# Patient Record
Sex: MALE | Race: WHITE | NOT HISPANIC OR LATINO | Employment: FULL TIME | URBAN - METROPOLITAN AREA
[De-identification: names, ages, dates, MRNs, and addresses within clinical notes are randomized per-mention and may not be internally consistent; named-entity substitution may affect disease eponyms.]

---

## 2017-09-20 DIAGNOSIS — J45.40 MODERATE PERSISTENT ASTHMA WITHOUT COMPLICATION: ICD-10-CM

## 2017-09-20 NOTE — TELEPHONE ENCOUNTER
Refill sent for Advair. Patient needs provider appointment for asthma. Please send letter..  .Shu RUBALCAVAN, RN, CPN

## 2017-09-20 NOTE — LETTER
September 20, 2017    Theo Pozo  [address] [facility] [address]    Dear Theo,       We recently received a refill request for fluticasone-salmeterol (ADVAIR DISKUS) 500-50 MCG/DOSE diskus inhaler.  We have refilled this for one time only because you are due for a:    Asthma office visit      Please call at your earliest convenience so that there will not be a delay with your future refills.          Thank you,   Your [facility] Team/  [phone]

## 2017-10-23 ENCOUNTER — TELEPHONE (OUTPATIENT)
Dept: FAMILY MEDICINE | Facility: CLINIC | Age: 37
End: 2017-10-23

## 2017-10-23 DIAGNOSIS — J45.40 MODERATE PERSISTENT ASTHMA WITHOUT COMPLICATION: ICD-10-CM

## 2017-10-23 NOTE — TELEPHONE ENCOUNTER
To alternate provider to deny refill please.  advair inhaler refill  Last OV: 10/4/16  Was given refill x1 in Sept;  Note states needs to be seen    Health Maintenance Due   Topic Date Due     ASTHMA CONTROL TEST Q6 MOS  04/04/2017     ASTHMA ACTION PLAN Q1 YR  08/05/2017     INFLUENZA VACCINE (SYSTEM ASSIGNED)  09/01/2017

## 2017-10-24 NOTE — TELEPHONE ENCOUNTER
-please try to schedule follow up appointment with patient.     RN- this encounter is for Jeri.     Staci Madrigal RN

## 2017-10-27 NOTE — TELEPHONE ENCOUNTER
LM for patient letting him know rxs were denied, due to be seen. Left direct call back number.    Marielena Ybarra,

## 2017-10-27 NOTE — TELEPHONE ENCOUNTER
Patient scheduled at [facility] on Tue 10/31/17 (patient moved and will be establishing at [place])    Please give short refill on Advair to the [facility] in [place].    Marielena Ybarra,

## 2017-10-27 NOTE — PROGRESS NOTES
SUBJECTIVE:                                                    Theo Pozo is a 37 year old male who presents to clinic today for the following health issues:  ASTHMA ACTION PLAN     HPI    Asthma Follow-Up    Was ACT completed today?    Yes    ACT Total Scores 10/31/2017   ACT TOTAL SCORE -   ASTHMA ER VISITS -   ASTHMA HOSPITALIZATIONS -   ACT TOTAL SCORE (Goal Greater than or Equal to 20) 23   In the past 12 months, how many times did you visit the emergency room for your asthma without being admitted to the hospital? 0   In the past 12 months, how many times were you hospitalized overnight because of your asthma? 0       Recent asthma triggers that patient is dealing with: upper respiratory infections and humidity              Problem list and histories reviewed & adjusted, as indicated.  Additional history:     Patient Active Problem List   Diagnosis     CARDIOVASCULAR SCREENING; LDL GOAL LESS THAN 160     Epidermal inclusion cyst     Moderate persistent asthma without complication     Tobacco abuse disorder     Past Surgical History:   Procedure Laterality Date     ARTHROSCOPY KNEE RT/LT      Right knee for meniscal tear     ENDOSCOPY  2005    negatve findings     SURGICAL HISTORY OF -       nasal surgery for epistaxis and obstruction       Social History   Substance Use Topics     Smoking status: Current Every Day Smoker     Packs/day: 1.00     Years: 14.00     Types: Cigarettes     Smokeless tobacco: Former User      Comment: Advised to quit      Alcohol use 0.0 oz/week      Comment: social infrequently     Family History   Problem Relation Age of Onset     GASTROINTESTINAL DISEASE Mother      Crohn's disease         Current Outpatient Prescriptions   Medication Sig Dispense Refill     fluticasone-salmeterol (ADVAIR DISKUS) 500-50 MCG/DOSE diskus inhaler Inhale 1 puff into the lungs 2 times daily Patient needs provider appointment for more refills 1 Inhaler 0     albuterol (PROAIR HFA) 108 (90 BASE)  "MCG/ACT Inhaler Inhale 2 puffs into the lungs every 4 hours as needed 1 Inhaler 1     [DISCONTINUED] fluticasone-salmeterol (ADVAIR DISKUS) 500-50 MCG/DOSE diskus inhaler Inhale 1 puff into the lungs 2 times daily Patient needs provider appointment for more refills 1 Inhaler 0     [DISCONTINUED] albuterol (ALBUTEROL) 108 (90 BASE) MCG/ACT inhaler Inhale 2 puffs into the lungs every 4 hours as needed 1 Inhaler 1     Allergies   Allergen Reactions     Penicillins      Sulfa Drugs      BP Readings from Last 3 Encounters:   10/31/17 120/66   10/04/16 113/76   08/05/16 110/72    Wt Readings from Last 3 Encounters:   10/31/17 199 lb (90.3 kg)   10/04/16 209 lb (94.8 kg)   08/05/16 204 lb (92.5 kg)                        ROS:  Constitutional, HEENT, cardiovascular, pulmonary, gi and gu systems are negative, except as otherwise noted.      OBJECTIVE:   /66 (Cuff Size: Adult Regular)  Pulse 84  Temp 98.5  F (36.9  C) (Temporal)  Resp 16  Ht 5' 7.75\" (1.721 m)  Wt 199 lb (90.3 kg)  BMI 30.48 kg/m2  Body mass index is 30.48 kg/(m^2).  GENERAL: healthy, alert and no distress  NECK: no adenopathy, no asymmetry, masses, or scars and trachea midline and normal to palpation  RESP: lungs clear to auscultation - no rales, rhonchi or wheezes  CV: regular rate and rhythm, normal S1 S2, no S3 or S4, no murmur, click or rub, no peripheral edema and peripheral pulses strong  MS: no gross musculoskeletal defects noted, no edema    Diagnostic Test Results:  none     ASSESSMENT/PLAN:     1. Moderate persistent asthma without complication  Doing well! Refilled as requested - rov 6 MONTHS.  - fluticasone-salmeterol (ADVAIR DISKUS) 500-50 MCG/DOSE diskus inhaler; Inhale 1 puff into the lungs 2 times daily Patient needs provider appointment for more refills  Dispense: 3 Inhaler; Refill:1  - albuterol (PROAIR HFA) 108 (90 BASE) MCG/ACT Inhaler; Inhale 2 puffs into the lungs every 4 hours as needed  Dispense: 1 Inhaler; Refill: 1    2. " Tobacco abuse disorder  ADVISED CESSATION, HE DECLINES.    Jay Bernstein PA-C  [facility]

## 2017-10-31 ENCOUNTER — OFFICE VISIT (OUTPATIENT)
Dept: FAMILY MEDICINE | Facility: OTHER | Age: 37
End: 2017-10-31
Payer: MEDICAID

## 2017-10-31 VITALS
DIASTOLIC BLOOD PRESSURE: 66 MMHG | RESPIRATION RATE: 16 BRPM | SYSTOLIC BLOOD PRESSURE: 120 MMHG | TEMPERATURE: 98.5 F | HEIGHT: 68 IN | HEART RATE: 84 BPM | WEIGHT: 199 LBS | BODY MASS INDEX: 30.16 KG/M2

## 2017-10-31 DIAGNOSIS — J45.40 MODERATE PERSISTENT ASTHMA WITHOUT COMPLICATION: ICD-10-CM

## 2017-10-31 DIAGNOSIS — Z72.0 TOBACCO ABUSE DISORDER: Primary | ICD-10-CM

## 2017-10-31 PROCEDURE — 99213 OFFICE O/P EST LOW 20 MIN: CPT | Performed by: PHYSICIAN ASSISTANT

## 2017-10-31 RX ORDER — ALBUTEROL SULFATE 90 UG/1
2 AEROSOL, METERED RESPIRATORY (INHALATION) EVERY 4 HOURS PRN
Qty: 1 INHALER | Refills: 1 | Status: SHIPPED | OUTPATIENT
Start: 2017-10-31 | End: 2018-09-19

## 2017-10-31 ASSESSMENT — PAIN SCALES - GENERAL: PAINLEVEL: NO PAIN (0)

## 2017-10-31 NOTE — NURSING NOTE
"Chief Complaint   Patient presents with     Asthma     Panel Management     ACT, AAP, Flu       Initial /66 (Cuff Size: Adult Regular)  Pulse 84  Temp 98.5  F (36.9  C) (Temporal)  Resp 16  Ht 5' 7.75\" (1.721 m)  Wt 199 lb (90.3 kg)  BMI 30.48 kg/m2 Estimated body mass index is 30.48 kg/(m^2) as calculated from the following:    Height as of this encounter: 5' 7.75\" (1.721 m).    Weight as of this encounter: 199 lb (90.3 kg).  Medication Reconciliation: complete   Aby Dillon CMA (AAMA)    "

## 2017-10-31 NOTE — LETTER
My Asthma Action Plan  Name: Theo Pozo   YOB: 1980  Date: 11/9/2017   My doctor: Jay Bernstein PA-C   My clinic: [facility]        My Control Medicine: Fluticasone + salmeterol (Advair) -  Diskus 250/50 mcg ,  My Rescue Medicine: Albuterol (Proair/Ventolin/Proventil) inhaler as directed   My Asthma Severity: moderate persistent  Avoid your asthma triggers: Patient is unaware of triggers  upper respiratory infections  humidity            GREEN ZONE   Good Control    I feel good    No cough or wheeze    Can work, sleep and play without asthma symptoms       Take your asthma control medicine every day.     1. If exercise triggers your asthma, take your rescue medication    15 minutes before exercise or sports, and    During exercise if you have asthma symptoms  2. Spacer to use with inhaler: If you have a spacer, make sure to use it with your inhaler             YELLOW ZONE Getting Worse  I have ANY of these:    I do not feel good    Cough or wheeze    Chest feels tight    Wake up at night   1. Keep taking your Green Zone medications  2. Start taking your rescue medicine:    every 20 minutes for up to 1 hour. Then every 4 hours for 24-48 hours.  3. If you stay in the Yellow Zone for more than 12-24 hours, contact your doctor.  4. If you do not return to the Green Zone in 12-24 hours or you get worse, start taking your oral steroid medicine if prescribed by your provider.           RED ZONE Medical Alert - Get Help  I have ANY of these:    I feel awful    Medicine is not helping    Breathing getting harder    Trouble walking or talking    Nose opens wide to breathe       1. Take your rescue medicine NOW  2. If your provider has prescribed an oral steroid medicine, start taking it NOW  3. Call your doctor NOW  4. If you are still in the Red Zone after 20 minutes and you have not reached your doctor:    Take your rescue medicine again and    Call 911 or go to the emergency room right  away    See your regular doctor within 2 weeks of an Emergency Room or Urgent Care visit for follow-up treatment.        Electronically signed by: Jay Bernstein, November 9, 2017    Annual Reminders:  Meet with Asthma Educator,  Flu Shot in the Fall, consider Pneumonia Vaccination for patients with asthma (aged 19 and older).    Pharmacy:    [id number], [place] - [address] AT [facility] & [facility]  [id number] - [place], [place] - [address] AT [facility] [address]  [facility]  [place], [place] - [address] AT [facility] OF  [address]                    Asthma Triggers  How To Control Things That Make Your Asthma Worse    Triggers are things that make your asthma worse.  Look at the list below to help you find your triggers and what you can do about them.  You can help prevent asthma flare-ups by staying away from your triggers.      Trigger                                                          What you can do   Cigarette Smoke  Tobacco smoke can make asthma worse. Do not allow smoking in your home, car or around you.  Be sure no one smokes at a child s day care or school.  If you smoke, ask your health care provider for ways to help you quit.  Ask family members to quit too.  Ask your health care provider for a referral to Quit Plan to help you quit smoking, or call 9-732-664-PLAN.     Colds, Flu, Bronchitis  These are common triggers of asthma. Wash your hands often.  Don t touch your eyes, nose or mouth.  Get a flu shot every year.     Dust Mites  These are tiny bugs that live in cloth or carpet. They are too small to see. Wash sheets and blankets in hot water every week.   Encase pillows and mattress in dust mite proof covers.  Avoid having carpet if you can. If you have carpet, vacuum weekly.   Use a dust mask and HEPA vacuum.   Pollen and Outdoor Mold  Some people are allergic to trees, grass, or weed pollen, or molds.  Try to keep your windows closed.  Limit time out doors when pollen count is high.   Ask you health care provider about taking medicine during allergy season.     Animal Dander  Some people are allergic to skin flakes, urine or saliva from pets with fur or feathers. Keep pets with fur or feathers out of your home.    If you can t keep the pet outdoors, then keep the pet out of your bedroom.  Keep the bedroom door closed.  Keep pets off cloth furniture and away from stuffed toys.     Mice, Rats, and Cockroaches  Some people are allergic to the waste from these pests.   Cover food and garbage.  Clean up spills and food crumbs.  Store grease in the refrigerator.   Keep food out of the bedroom.   Indoor Mold  This can be a trigger if your home has high moisture. Fix leaking faucets, pipes, or other sources of water.   Clean moldy surfaces.  Dehumidify basement if it is damp and smelly.   Smoke, Strong Odors, and Sprays  These can reduce air quality. Stay away from strong odors and sprays, such as perfume, powder, hair spray, paints, smoke incense, paint, cleaning products, candles and new carpet.   Exercise or Sports  Some people with asthma have this trigger. Be active!  Ask your doctor about taking medicine before sports or exercise to prevent symptoms.    Warm up for 5-10 minutes before and after sports or exercise.     Other Triggers of Asthma  Cold air:  Cover your nose and mouth with a scarf.  Sometimes laughing or crying can be a trigger.  Some medicines and food can trigger asthma.

## 2017-10-31 NOTE — MR AVS SNAPSHOT
"              After Visit Summary   10/31/2017    Theo Pozo    MRN: [id number]           Patient Information     Date Of Birth          1980        Visit Information        Provider Department      10/31/2017 1:00 PM Jay Goodwin PA-C [facility]        Today's Diagnoses     Tobacco abuse disorder    -  1    Moderate persistent asthma without complication           Follow-ups after your visit        Who to contact     If you have questions or need follow up information about today's clinic visit or your schedule please contact [facility] directly at [phone].  Normal or non-critical lab and imaging results will be communicated to you by Mylahart, letter or phone within 4 business days after the clinic has received the results. If you do not hear from us within 7 days, please contact the clinic through Reach Prost or phone. If you have a critical or abnormal lab result, we will notify you by phone as soon as possible.  Submit refill requests through Wizdee or call your pharmacy and they will forward the refill request to us. Please allow 3 business days for your refill to be completed.          Additional Information About Your Visit        MyChart Information     Wizdee gives you secure access to your electronic health record. If you see a primary care provider, you can also send messages to your care team and make appointments. If you have questions, please call your primary care clinic.  If you do not have a primary care provider, please call [phone] and they will assist you.        Care EveryWhere ID     This is your Care EveryWhere ID. This could be used by other organizations to access your [facility] medical records  [id number]        Your Vitals Were     Pulse Temperature Respirations Height BMI (Body Mass Index)       84 98.5  F (36.9  C) (Temporal) 16 5' 7.75\" (1.721 m) 30.48 kg/m2        Blood Pressure from Last 3 Encounters:   10/31/17 120/66   10/04/16 " 113/76   08/05/16 110/72    Weight from Last 3 Encounters:   10/31/17 199 lb (90.3 kg)   10/04/16 209 lb (94.8 kg)   08/05/16 204 lb (92.5 kg)              Today, you had the following     No orders found for display         Where to get your medicines      These medications were sent to [id number] - [facility] [address] AT [facility] [address]  [address], [facility] [address]     Phone:  [phone]     albuterol 108 (90 BASE) MCG/ACT Inhaler    fluticasone-salmeterol 500-50 MCG/DOSE diskus inhaler          Primary Care Provider Office Phone # Fax #    Miguel Zavala [phone] [address]  [facility] [address]        Equal Access to Services     [facility] : Hadii karthikeyan ku hadasho Soomaali, waaxda luqadaha, qaybta kaalmada adeegyada, waxay moraimain haypaulino easton . So [facility] [phone].    ATENCIÓN: Si habla español, tiene a choudhary disposición servicios gratuitos de asistencia lingüística. [facility] [phone].    We comply with applicable federal civil rights laws and [place] laws. We do not discriminate on the basis of race, color, national origin, age, disability, sex, sexual orientation, or gender identity.            Thank you!     Thank you for choosing [facility]  for your care. Our goal is always to provide you with excellent care. Hearing back from our patients is one way we can continue to improve our services. Please take a few minutes to complete the written survey that you may receive in the mail after your visit with us. Thank you!             Your Updated Medication List - Protect others around you: Learn how to safely use, store and throw away your medicines at www.disposemymeds.org.          This list is accurate as of: 10/31/17  1:12 PM.  Always use your most recent med list.                   Brand Name Dispense Instructions for use Diagnosis    albuterol 108 (90 BASE) MCG/ACT Inhaler    PROAIR HFA     1 Inhaler    Inhale 2 puffs into the lungs every 4 hours as needed    Moderate persistent asthma without complication       fluticasone-salmeterol 500-50 MCG/DOSE diskus inhaler    ADVAIR DISKUS    1 Inhaler    Inhale 1 puff into the lungs 2 times daily Patient needs provider appointment for more refills    Moderate persistent asthma without complication

## 2017-11-01 ASSESSMENT — ASTHMA QUESTIONNAIRES: ACT_TOTALSCORE: 23

## 2017-11-10 ENCOUNTER — TELEPHONE (OUTPATIENT)
Dept: FAMILY MEDICINE | Facility: CLINIC | Age: 37
End: 2017-11-10

## 2017-11-21 DIAGNOSIS — J45.40 MODERATE PERSISTENT ASTHMA WITHOUT COMPLICATION: ICD-10-CM

## 2017-11-22 ENCOUNTER — TELEPHONE (OUTPATIENT)
Dept: FAMILY MEDICINE | Facility: OTHER | Age: 37
End: 2017-11-22

## 2017-11-22 DIAGNOSIS — J45.40 MODERATE PERSISTENT ASTHMA WITHOUT COMPLICATION: ICD-10-CM

## 2017-11-22 NOTE — TELEPHONE ENCOUNTER
Per [facility] pharmacy a Prior Authorization is needed for Albuterol inhaler, [facility] pharmacy states that patient has changed insurance recently. They are sending PA info to us  Elisabeth Hampton RT (R)

## 2017-11-22 NOTE — TELEPHONE ENCOUNTER
[facility] phone call message- patient requests medication or medication refill:    If this is a refill request, has the caller requested the refill from the pharmacy already? Yes  Name of the pharmacy and phone number for the current request:  [facility]    Name of the medication requested: inhaler (marce was driving and did not have any other information)    Other request: patient said the pharmacy was trying to call us and we were not responding.     OK to leave the result message on voice mail or with a family member? NO    Call taken on 11/22/2017 at 12:27 PM by Nadira Villegas

## 2017-11-22 NOTE — TELEPHONE ENCOUNTER
Script was senton 10/31/2017 with refills to [facility]  Please call patient to let him know.    Luis Manuel Sims, RN, BSN

## 2017-11-27 NOTE — TELEPHONE ENCOUNTER
Please process as URGENT, patient called in today 11/27/17 stating he will only have enough for 4 days and needs to have his inhaler states he can't live without it.   Thanks  Elisabeth Hampton RT (R)

## 2017-11-27 NOTE — TELEPHONE ENCOUNTER
Per fax from [facility] a Prior Authorization is needed for Advair Multifonds  Insurance [phone]  ID#[id number]  Thanks   Elisabeth CowartR)

## 2017-11-27 NOTE — TELEPHONE ENCOUNTER
PA Initiation    Medication: fluticasone-salmeterol (ADVAIR DISKUS) 500-50   Insurance Company: [employer] [place] - Phone [phone] Fax [phone]  Pharmacy Filling the Rx: [facility] [address] [place], [place] - [address] AT [facility] [address]  Filling Pharmacy Phone: [phone]  Filling Pharmacy Fax:    Start Date: 11/27/2017

## 2017-11-28 NOTE — TELEPHONE ENCOUNTER
Spoke with patient informed him that insurance denied PA for Advair Diskus, due to not having tried and failed 2 other alternatives. Patient is willing to try an alternative, he would like something that is very similar to the Advair Diskus, he said he has been taking this same medication for many years and is scarred to try something else cause this has always worked so well for him.  Patient will be out of medication in 3 days, [facility] [place]   Thanks   Elisabeth Hampton RT (R)

## 2017-11-28 NOTE — TELEPHONE ENCOUNTER
PRIOR AUTHORIZATION DENIED    Medication: fluticasone-salmeterol (ADVAIR DISKUS) 500-50 - DENIED    Denial Date: 11/27/2017    Denial Rational: Denied due to patient having to try and fail alternatives first: Symbicort (which requires a PA), generic Airduo, Spiriva handihaler, and Spiriva respimat:         Appeal Information:

## 2017-11-28 NOTE — TELEPHONE ENCOUNTER
Sent a prescription for real Breo-Ellipta to his pharmacy. Hopefully it works.  Electronically signed:    Jay Bernstein PA-C

## 2017-11-29 RX ORDER — FLUTICASONE PROPIONATE AND SALMETEROL 232; 14 UG/1; UG/1
1 POWDER, METERED RESPIRATORY (INHALATION) 2 TIMES DAILY
Qty: 1 EACH | Refills: 1 | Status: SHIPPED | OUTPATIENT
Start: 2017-11-29 | End: 2019-08-28 | Stop reason: ALTCHOICE

## 2017-11-29 NOTE — TELEPHONE ENCOUNTER
Patient wants a call back with a plan as soon as possible he was very upset this is taking so long to find a medication covered.  Please call

## 2017-11-29 NOTE — TELEPHONE ENCOUNTER
Spoke to patient informed him of new Rx that was sent to [facility] pharmacy, I called [facility] to have them run the Rx through patients insurance and pharmacy told me $0.00 co-pay, but they will not have medication in stock until Thursday 11/30/2017, patient was informed of this and is fine with that he just needs to  by Friday  Closing encounter

## 2017-11-29 NOTE — TELEPHONE ENCOUNTER
Patient is calling back and his insurance will not cover the Breo.  He needs to get this figured out ASAP.  Please call

## 2017-12-01 ENCOUNTER — TELEPHONE (OUTPATIENT)
Dept: FAMILY MEDICINE | Facility: OTHER | Age: 37
End: 2017-12-01

## 2017-12-01 NOTE — TELEPHONE ENCOUNTER
Pt returning call, states needs medication today and following up on status of medication being sent to [facility].      Please contact pt in regards

## 2017-12-01 NOTE — TELEPHONE ENCOUNTER
Pt calling. They still do not have this in stock, can Rx be sent to [facility] instead?   Thank you,  Mehreen Garcia- Pt Rep.

## 2017-12-01 NOTE — TELEPHONE ENCOUNTER
Left message for patient to call, when patient calls back please confirm with him that he has received his medication, I spoke to [facility] pharmacy and they stated that Rx has been transferred to [facility] pharmacy, I spoke to [facility] pharmacy they confirmed that they have received his refill request and have it ready for  for him.    I did call [facility] pharmacy and that this medication is not in stock.   Thanks  Elisabeth Hampton RT (R)

## 2017-12-28 ENCOUNTER — TELEPHONE (OUTPATIENT)
Dept: FAMILY MEDICINE | Facility: OTHER | Age: 37
End: 2017-12-28

## 2017-12-28 DIAGNOSIS — J45.40 MODERATE PERSISTENT ASTHMA WITHOUT COMPLICATION: ICD-10-CM

## 2017-12-28 NOTE — TELEPHONE ENCOUNTER
Reason for Call:  Medication or medication refill:    Do you use a [facility]?  Name of the pharmacy and phone number for the current request:  [facility] - [phone]    Name of the medication requested: advair    Other request: patient calling on status or if we received rx request from [facility].  He states they faxed over a request already.     Can we leave a detailed message on this number? YES    Phone number patient can be reached at: Home number on file [phone] (home)    Best Time: any    Call taken on 12/28/2017 at 2:20 PM by Melba Nunez

## 2017-12-28 NOTE — TELEPHONE ENCOUNTER
Rx has been entereded and routed to Dr rBock as he is the only provider in today  Closing encounter  Elisabeth Hampton RT (R)

## 2017-12-28 NOTE — TELEPHONE ENCOUNTER
"Reason for Call:  Medication or medication refill:    Do you use a [facility]?  Name of the pharmacy and phone number for the current request:  {ER/RG/ Pharmacies:[id number]}    Name of the medication requested: ***    Other request: ***    Can we leave a detailed message on this number? { :[phone]::\"YES\"}    Phone number patient can be reached at: {PHONE:[phone]}    Best Time: ***    Call taken on 12/28/2017 at 2:15 PM by Melba Nunez      "

## 2017-12-28 NOTE — TELEPHONE ENCOUNTER
"Routing to Dr Brock, as Xiomara Swift is out til 1/2/2018  Per fax from [facility] in [place], \" Can you send a Rx for ADVAIR as AIR DUO is on back order and Insurance has authorized it.  Please send it ASAP as the authorization from insurance ends 12/29/2017, thanks\"   Elisabeth Hampton RT (R)      "

## 2017-12-29 NOTE — TELEPHONE ENCOUNTER
Pt calling back upset. [facility] to [facility]. Looks like it was sent to [facility] but he called   Thank you,  Mehreen Garcia- Pt Rep.

## 2018-01-05 ENCOUNTER — TELEPHONE (OUTPATIENT)
Dept: FAMILY MEDICINE | Facility: OTHER | Age: 38
End: 2018-01-05

## 2018-01-05 DIAGNOSIS — J45.40 MODERATE PERSISTENT ASTHMA WITHOUT COMPLICATION: Primary | ICD-10-CM

## 2018-01-05 NOTE — TELEPHONE ENCOUNTER
Central Prior Authorization Team   Phone: [phone]    PA Initiation    Medication: Advair Diskus 250-50mcg  Insurance Company: [employer] - Phone [phone] Fax [phone]  Pharmacy Filling the Rx: [facility] [place], [place] - [address] [facility]  Filling Pharmacy Phone: [phone]  Filling Pharmacy Fax:    Start Date: 1/5/2018

## 2018-01-08 NOTE — TELEPHONE ENCOUNTER
PRIOR AUTHORIZATION DENIED    Medication: Advair Diskus 250-50mcg - denied    Denial Date: 1/5/2018    Denial Rational:       Appeal Information:

## 2018-01-12 ENCOUNTER — TELEPHONE (OUTPATIENT)
Dept: FAMILY MEDICINE | Facility: OTHER | Age: 38
End: 2018-01-12

## 2018-01-12 RX ORDER — BUDESONIDE AND FORMOTEROL FUMARATE DIHYDRATE 160; 4.5 UG/1; UG/1
2 AEROSOL RESPIRATORY (INHALATION) 2 TIMES DAILY
Qty: 3 INHALER | Refills: 1 | Status: SHIPPED | OUTPATIENT
Start: 2018-01-12 | End: 2018-02-22

## 2018-01-12 NOTE — TELEPHONE ENCOUNTER
Prior Authorization is needed for Symbicort    Cover My Meds   Key: [id number]    GARETH helm PA was just denied for Advair Diskus 250-50mcg, please see other encounter  thanks  Elisabeth KERR (R)

## 2018-01-15 NOTE — TELEPHONE ENCOUNTER
Prior Authorization Approval    Authorization Effective Date: 1/12/2018  Authorization Expiration Date: 1/12/2020  Medication: Symbicort-APPROVED  Approved Dose/Quantity:    Reference #: [id number]   Insurance Company: [employer] [place] - Phone [phone] Fax [phone]  Expected CoPay: $0.00     CoPay Card Available:      Foundation Assistance Needed:    Which Pharmacy is filling the prescription (Not needed for infusion/clinic administered): [facility] [address] [place] [id number] [facility]  Pharmacy Notified: Yes  Patient Notified: Yes

## 2018-01-15 NOTE — TELEPHONE ENCOUNTER
PA Initiation    Medication: Symbicort  Insurance Company: [employer] [place] - Phone [phone] Fax [phone]  Pharmacy Filling the Rx: [facility] [address] [place] [id number] [facility]  Filling Pharmacy Phone: [phone]  Filling Pharmacy Fax: [phone]  Start Date: 1/15/2018

## 2018-02-21 NOTE — PROGRESS NOTES
"  SUBJECTIVE:   Theo Pozo is a 37 year old male who presents to clinic today for the following health issues:    HPI  Joint Pain    Onset: years    Description:   Location: Right hip low bacik  Character: Sharp    Intensity: mild, severe    Progression of Symptoms: worse    Accompanying Signs & Symptoms:  Other symptoms: none    History:   Previous similar pain: YES      Precipitating factors:   Trauma or overuse: no     Alleviating factors:  Improved by: nothing    Therapies Tried and outcome: \"Patches OTC' Ibuprofen      Problem list and histories reviewed & adjusted, as indicated.  Additional history: as documented    Patient Active Problem List   Diagnosis     CARDIOVASCULAR SCREENING; LDL GOAL LESS THAN 160     Epidermal inclusion cyst     Moderate persistent asthma without complication     Tobacco abuse disorder     Past Surgical History:   Procedure Laterality Date     ARTHROSCOPY KNEE RT/LT      Right knee for meniscal tear     ENDOSCOPY  2005    negatve findings     SURGICAL HISTORY OF -       nasal surgery for epistaxis and obstruction       Social History   Substance Use Topics     Smoking status: Current Every Day Smoker     Packs/day: 1.00     Years: 14.00     Types: Cigarettes     Smokeless tobacco: Former User      Comment: Advised to quit      Alcohol use 0.0 oz/week      Comment: social infrequently     Family History   Problem Relation Age of Onset     GASTROINTESTINAL DISEASE Mother      Crohn's disease         Current Outpatient Prescriptions   Medication Sig Dispense Refill     Fluticasone-Salmeterol 232-14 MCG/ACT AEPB Inhale 1 puff into the lungs 2 times daily 1 each 1     albuterol (PROAIR HFA) 108 (90 BASE) MCG/ACT Inhaler Inhale 2 puffs into the lungs every 4 hours as needed 1 Inhaler 1     budesonide-formoterol (SYMBICORT) 160-4.5 MCG/ACT Inhaler Inhale 2 puffs into the lungs 2 times daily (Patient not taking: Reported on 2/22/2018) 3 Inhaler 1     Allergies   Allergen Reactions     " "Penicillins      Sulfa Drugs      BP Readings from Last 3 Encounters:   02/22/18 104/72   10/31/17 120/66   10/04/16 113/76    Wt Readings from Last 3 Encounters:   02/22/18 203 lb (92.1 kg)   10/31/17 199 lb (90.3 kg)   10/04/16 209 lb (94.8 kg)                  Labs reviewed in EPIC    ROS:  Constitutional, HEENT, cardiovascular, pulmonary, gi and gu systems are negative, except as otherwise noted.    OBJECTIVE:     /72 (Cuff Size: Adult Regular)  Pulse 72  Temp 98  F (36.7  C) (Temporal)  Resp 20  Ht 5' 7.13\" (1.705 m)  Wt 203 lb (92.1 kg)  BMI 31.68 kg/m2  Body mass index is 31.68 kg/(m^2).  GENERAL: healthy, alert and no distress  NECK: no adenopathy, no asymmetry, masses, or scars and trachea midline and normal to palpation  RESP: lungs clear to auscultation - no rales, rhonchi or wheezes  CV: regular rate and rhythm, normal S1 S2, no S3 or S4, no murmur, click or rub, no peripheral edema and peripheral pulses strong  ABDOMEN: soft, nontender, no hepatosplenomegaly, no masses and bowel sounds normal  MS: no gross musculoskeletal defects noted, no edema  SKIN: no suspicious lesions or rashes  NEURO: Normal strength and tone, mentation intact and speech normal  Comprehensive back pain exam:  Tenderness of Posterior gluteal muscles more on the right than on the left, Range of motion not limited by pain, Lower extremity strength functional and equal on both sides, Lower extremity reflexes within normal limits bilaterally, Lower extremity sensation normal and equal on both sides and Straight leg raise negative bilaterally  PSYCH: mentation appears normal, affect normal/bright    Diagnostic Test Results:  No results found for this or any previous visit (from the past 24 hour(s)).  Xray - WNL    ASSESSMENT/PLAN:     1. Tobacco use disorder  Discussed as a mode that he needs to pay attention to and do his best to change.  - TOBACCO CESSATION - FOR HEALTH MAINTENANCE    2. Bilateral low back pain with " sciatica, sciatica laterality unspecified, unspecified chronicity  At this point time I suspect that this is sciatica.  I have advised that he be seen by physical therapy.  I advised that he consider MRI of the low back due to the intermittent and transient nature of his overall pain.  Currently does not have insurance so the best way for him to begin treatment of this condition is with physical therapy and aggressive exercise program to try to strengthen the related musculature.  I encouraged him to make a phone call to check on the coverage for an MRI with his current insurance coverage which evidently is minimal.  - PHYSICAL THERAPY REFERRAL  - MR Lumbar Spine w/o Contrast; Future    3. Chronic buttock pain  - MR Lumbar Spine w/o Contrast; Future    Follow-up as needed.    Jay Bernstein PA-C  [facility]

## 2018-02-22 ENCOUNTER — OFFICE VISIT (OUTPATIENT)
Dept: FAMILY MEDICINE | Facility: OTHER | Age: 38
End: 2018-02-22
Payer: COMMERCIAL

## 2018-02-22 VITALS
WEIGHT: 203 LBS | TEMPERATURE: 98 F | RESPIRATION RATE: 20 BRPM | DIASTOLIC BLOOD PRESSURE: 72 MMHG | SYSTOLIC BLOOD PRESSURE: 104 MMHG | HEART RATE: 72 BPM | HEIGHT: 67 IN | BODY MASS INDEX: 31.86 KG/M2

## 2018-02-22 DIAGNOSIS — M54.40 BILATERAL LOW BACK PAIN WITH SCIATICA, SCIATICA LATERALITY UNSPECIFIED, UNSPECIFIED CHRONICITY: ICD-10-CM

## 2018-02-22 DIAGNOSIS — F17.200 TOBACCO USE DISORDER: Primary | ICD-10-CM

## 2018-02-22 DIAGNOSIS — M79.18 CHRONIC BUTTOCK PAIN: ICD-10-CM

## 2018-02-22 DIAGNOSIS — G89.29 CHRONIC BUTTOCK PAIN: ICD-10-CM

## 2018-02-22 PROCEDURE — 99214 OFFICE O/P EST MOD 30 MIN: CPT | Performed by: PHYSICIAN ASSISTANT

## 2018-02-22 ASSESSMENT — PAIN SCALES - GENERAL: PAINLEVEL: MILD PAIN (2)

## 2018-02-22 NOTE — PATIENT INSTRUCTIONS
Low Back Pain            What is low back pain?   Low back pain is pain and stiffness in the lower back. It is one of the most common reasons people miss work.   How does it occur?   Your lower back is called your lumbar spine. It is made up of 5 bones called lumbar vertebrae. In between the vertebrae are shock absorbers called disks. Back pain can occur from an injury to the vertebrae or when a disk bulges or herniates.   Low back pain is usually caused when a ligament or muscle holding a vertebra in its proper position is strained. Vertebrae are bones that make up the spinal column through which the spinal cord passes. When these muscles or ligaments become weak or strained, the spine loses its stability, resulting in pain.   Low back pain can occur if your job involves lifting and carrying heavy objects, or if you spend a lot of time sitting or standing in one position or bending over. It can be caused by a fall or by unusually strenuous exercise. It can be brought on by the tension and stress that cause headaches in some people. It can even be brought on by violent sneezing or coughing.   People who are overweight may have low back pain because of the added stress on their back.   Back pain may occur when the muscles, joints, bones, and connective tissues of the back become inflamed as a result of an infection or an immune system problem. Arthritic disorders as well as some congenital and degenerative conditions may cause back pain.   Back pain accompanied by loss of bladder or bowel control, trouble moving your legs, or numbness or tingling in your arms or legs requires immediate medical treatment.   What are the symptoms?   Symptoms include:   pain in the back or legs   stiffness, spasm, or limited motion   The pain may be constant or may happen only in certain positions. It may get worse when you cough, sneeze, bend, twist, or strain during a bowel movement. The pain may be in only one spot or may  spread to other areas, most commonly down the buttocks and into the back of the thigh.   A low back strain typically does not produce pain past the knee into the calf or foot. Tingling or numbness in the calf or foot may indicate a herniated disk or pinched nerve.   Be sure to see your healthcare provider if:   You have weakness in your leg, especially if you cannot lift your foot, because this may be a sign of nerve damage.   You have new bowel or bladder problems as well as back pain, which may be a sign of severe injury to your spinal cord.   You have pain that gets worse despite treatment.   How is it diagnosed?   Your healthcare provider will review your medical history and examine you. You may have X-rays, an MRI, CT scan, or a bone scan.   How is it treated?   To treat this condition:   Put an ice pack, gel pack, or package of frozen vegetables, wrapped in a cloth on the area every 3 to 4 hours, for up to 20 minutes at a time for the first 2 or 3 days.   Use a heating pad or hot water bottle. Don't let the heating pad get too hot, and don't fall asleep with it. You could get a burn.   Rest in bed on a firm mattress. Often it helps to lie on your back with your knees raised on a pillow. However, some people prefer to lie on their side with their knees bent. It's best to try to stay active, so try not to rest in bed longer than 1 to 2 days.   Take muscle relaxants as recommended by your healthcare provider.   Take an anti-inflammatory such as ibuprofen, or other medicine as directed by your provider. Nonsteroidal anti-inflammatory medicines (NSAIDs) may cause stomach bleeding and other problems. These risks increase with age. Read the label and take as directed. Unless recommended by your healthcare provider, do not take for more than 10 days.   Get a back massage by a trained person.   Wear a belt or corset to support your back.   Do the exercises recommended by your provider. Your provider may also prescribe  physical therapy.   Talk with a counselor, if your back pain is related to tension caused by emotional problems.   When the pain is gone, ask your healthcare provider about starting an exercise program such as the following:   Exercise moderately every day, using stretching and warm-up exercises suggested by your provider or physical therapist.   Exercise vigorously for about 30 minutes 3 times a week by walking, swimming, using a stationary bicycle, or doing low-impact aerobics.   Exercising regularly will not only help your back, it will also help keep you healthier overall.   How long will the effects last?   The effects of back pain last as long as the cause exists or until your body recovers from the strain, usually a day or two but sometimes weeks.   How can I take care of myself?   In addition to the treatment described above, keep in mind these suggestions:   Practice good posture. Stand with your head up, shoulders straight, chest forward, weight balanced evenly on both feet, and pelvis tucked in.   Lose weight if you are overweight   Keep your core muscles strong. These are your abdominal and back muscles.   Sleep without a pillow under your head.   Pain is the best way to  the pace you should set in increasing your activity and exercise. Minor discomfort, stiffness, soreness, and mild aches need not interfere with activity. However, limit your activities temporarily if:   Your symptoms return.   The pain increases when you are more active.   The pain increases within 24 hours after a new or higher level of activity.   When can I return to my normal activities?   Everyone recovers from an injury at a different rate. Return to your activities depends on how soon your back recovers, not by how many days or weeks it has been since your injury has occurred. In general, the longer you have symptoms before you start treatment, the longer it will take to get better. The goal is to return to your normal  activities as soon as is safely possible. If you return too soon you may worsen your injury.   It is important that you have fully recovered from your low back pain before you return to any strenuous activity. You must be able to have the same range of motion that you had before your injury. You must be able to walk and twist without pain.   What can I do to help prevent low back pain?   You can reduce the strain on your back by doing the following:   Don't push with your arms when you move a heavy object. Turn around and push backwards so the strain is taken by your legs.   Whenever you sit, sit in a straight-backed chair and hold your spine against the back of the chair.   Bend your knees and hips and keep your back straight when you lift a heavy object.   Avoid lifting heavy objects higher than your waist.   Hold packages you carry close to your body, with your arms bent.   Use a footrest for one foot when you stand or sit in one spot for a long time. This keeps your back straight.   Bend your knees when you bend over.   Sit close to the pedals when you drive and use your seat belt and a hard backrest or pillow.   Lie on your side with your knees bent when you sleep or rest. It may help to put a pillow between your knees.   Put a pillow under your knees when you sleep on your back.   Raise the foot of the bed 8 inches to discourage sleeping on your stomach unless you have other problems that require that you keep your head elevated.   To rest your back, hold each of these positions for 5?minutes or longer:   Lie on your back, bend your knees, and put pillows under your knees.   Lie on your back on the floor with a pillow under your neck. Bend your knees to a 90-degree angle, and put your lower legs and feet on a chair.   Lie on your back, bend your knees, and bring one knee up to your chest and hold it there. Repeat with the other knee, then bring both knees to your chest. When holding your knee to your chest,  grab your thigh rather than your lower leg to avoid over flexing your knee.     Published by [employer].  This content is reviewed periodically and is subject to change as new health information becomes available. The information is intended to inform and educate and is not a replacement for medical evaluation, advice, diagnosis or treatment by a healthcare professional.   Developed by Mahsa Apple RN, [place], and [facility].   ? 2010 [facility] and/or its affiliates. All Rights Reserved.           Low Back Pain Exercise          Standing hamstring stretch: Put the heel of one leg on a stool about 15 inches high. Keep your leg straight. Lean forward, bending at the hips until you feel a mild stretch in the back of your thigh. Make sure you do not roll your shoulders or bend at the waist when doing this. You want to stretch your leg, not your lower back. Hold the stretch for 15 to 30 seconds. Repeat with each leg 3 times.   Cat and camel: Get down on your hands and knees. Let your stomach sag, allowing your back to curve downward. Hold this position for 5 seconds. Then arch your back and hold for 5 seconds. Do 3 sets of 10.   Quadruped arm and leg raise: Get down on your hands and knees. Pull in your belly button and tighten your abdominal muscles to stiffen your spine. While keeping your abdominals tight, raise one arm and the opposite leg away from you. Hold this position for 5 seconds. Lower your arm and leg slowly and change sides. Do this 10 times on each side.   Pelvic tilt: Lie on your back with your knees bent and your feet flat on the floor. Tighten your abdominal muscles and push your lower back into the floor. Hold this position for 5 seconds, then relax. Do 3 sets of 10.   Partial curl: Lie on your back with your knees bent and your feet flat on the floor. Tighten your stomach muscles. Tuck your chin to your chest. With your hands stretched out in front of you, curl your upper body forward until your  shoulders clear the floor. Hold this position for 3 seconds. Don't hold your breath. It helps to breathe out as you lift your shoulders up. Relax back to the floor. Repeat 10 times. Build to 3 sets of 10. To challenge yourself, clasp your hands behind your head and keep your elbows out to the side.   Gluteal stretch: Lie on your back with both knees bent. Rest the ankle of one leg over the knee of your other leg. Grasp the thigh of the bottom leg and pull toward your chest. You will feel a stretch along the buttocks and possibly along the outside of your hip. Hold the stretch for 15 to 30 seconds. Repeat 3 times with each leg.   Extension exercise:   0. Lie face down on the floor for 5 minutes. If this hurts too much, lie face down with a pillow under your stomach. This should relieve your leg or back pain. When you can lie on your stomach for 5 minutes without a pillow, you can continue with Part B of this exercise.   0. After lying on your stomach for 5 minutes, prop yourself up on your elbows for another 5 minutes. If you can do this without having more leg or buttock pain, you can start doing part C of this exercise.   0. Lie on your stomach with your hands under your shoulders. Then press down on your hands and extend your elbows while keeping your hips flat on the floor. Hold for 1 second and lower yourself to the floor. Do 3 to 5 sets of 10 repetitions. Rest for 1 minute between sets. You should have no pain in your legs when you do this, but it is normal to feel some pain in your lower back.   Do this exercise several times a day.   Side plank: Lie on your side with your legs, hips, and shoulders in a straight line. Prop yourself up onto your forearm so your elbow is directly under your shoulder. Lift your hips off the floor and balance on your forearm and the outside of your foot. Try to hold this position for 15 seconds, then slowly lower your hip to the ground. Switch sides and repeat. Work up to holding  for 1 minute or longer. This exercise can be made easier by starting with your knees and hips flexed toward your chest.   Published by [employer].  This content is reviewed periodically and is subject to change as new health information becomes available. The information is intended to inform and educate and is not a replacement for medical evaluation, advice, diagnosis or treatment by a healthcare professional.   Written by Zoila Euceda, MS, PT, and Mahsa Chester PT, [employer], [facility], for [facility]   ? 2010 [facility] and/or its affiliates. All Rights Reserved.         Copyright   [employer] 2011

## 2018-02-22 NOTE — MR AVS SNAPSHOT
After Visit Summary   2/22/2018    Theo Pozo    MRN: [id number]           Patient Information     Date Of Birth          1980        Visit Information        Provider Department      2/22/2018 3:20 PM Jay Goodwin PA-C [facility]        Today's Diagnoses     Tobacco use disorder    -  1    Bilateral low back pain with sciatica, sciatica laterality unspecified, unspecified chronicity        Chronic buttock pain          Care Instructions             Low Back Pain            What is low back pain?   Low back pain is pain and stiffness in the lower back. It is one of the most common reasons people miss work.   How does it occur?   Your lower back is called your lumbar spine. It is made up of 5 bones called lumbar vertebrae. In between the vertebrae are shock absorbers called disks. Back pain can occur from an injury to the vertebrae or when a disk bulges or herniates.   Low back pain is usually caused when a ligament or muscle holding a vertebra in its proper position is strained. Vertebrae are bones that make up the spinal column through which the spinal cord passes. When these muscles or ligaments become weak or strained, the spine loses its stability, resulting in pain.   Low back pain can occur if your job involves lifting and carrying heavy objects, or if you spend a lot of time sitting or standing in one position or bending over. It can be caused by a fall or by unusually strenuous exercise. It can be brought on by the tension and stress that cause headaches in some people. It can even be brought on by violent sneezing or coughing.   People who are overweight may have low back pain because of the added stress on their back.   Back pain may occur when the muscles, joints, bones, and connective tissues of the back become inflamed as a result of an infection or an immune system problem. Arthritic disorders as well as some congenital and degenerative conditions may cause back  pain.   Back pain accompanied by loss of bladder or bowel control, trouble moving your legs, or numbness or tingling in your arms or legs requires immediate medical treatment.   What are the symptoms?   Symptoms include:   pain in the back or legs   stiffness, spasm, or limited motion   The pain may be constant or may happen only in certain positions. It may get worse when you cough, sneeze, bend, twist, or strain during a bowel movement. The pain may be in only one spot or may spread to other areas, most commonly down the buttocks and into the back of the thigh.   A low back strain typically does not produce pain past the knee into the calf or foot. Tingling or numbness in the calf or foot may indicate a herniated disk or pinched nerve.   Be sure to see your healthcare provider if:   You have weakness in your leg, especially if you cannot lift your foot, because this may be a sign of nerve damage.   You have new bowel or bladder problems as well as back pain, which may be a sign of severe injury to your spinal cord.   You have pain that gets worse despite treatment.   How is it diagnosed?   Your healthcare provider will review your medical history and examine you. You may have X-rays, an MRI, CT scan, or a bone scan.   How is it treated?   To treat this condition:   Put an ice pack, gel pack, or package of frozen vegetables, wrapped in a cloth on the area every 3 to 4 hours, for up to 20 minutes at a time for the first 2 or 3 days.   Use a heating pad or hot water bottle. Don't let the heating pad get too hot, and don't fall asleep with it. You could get a burn.   Rest in bed on a firm mattress. Often it helps to lie on your back with your knees raised on a pillow. However, some people prefer to lie on their side with their knees bent. It's best to try to stay active, so try not to rest in bed longer than 1 to 2 days.   Take muscle relaxants as recommended by your healthcare provider.   Take an anti-inflammatory  such as ibuprofen, or other medicine as directed by your provider. Nonsteroidal anti-inflammatory medicines (NSAIDs) may cause stomach bleeding and other problems. These risks increase with age. Read the label and take as directed. Unless recommended by your healthcare provider, do not take for more than 10 days.   Get a back massage by a trained person.   Wear a belt or corset to support your back.   Do the exercises recommended by your provider. Your provider may also prescribe physical therapy.   Talk with a counselor, if your back pain is related to tension caused by emotional problems.   When the pain is gone, ask your healthcare provider about starting an exercise program such as the following:   Exercise moderately every day, using stretching and warm-up exercises suggested by your provider or physical therapist.   Exercise vigorously for about 30 minutes 3 times a week by walking, swimming, using a stationary bicycle, or doing low-impact aerobics.   Exercising regularly will not only help your back, it will also help keep you healthier overall.   How long will the effects last?   The effects of back pain last as long as the cause exists or until your body recovers from the strain, usually a day or two but sometimes weeks.   How can I take care of myself?   In addition to the treatment described above, keep in mind these suggestions:   Practice good posture. Stand with your head up, shoulders straight, chest forward, weight balanced evenly on both feet, and pelvis tucked in.   Lose weight if you are overweight   Keep your core muscles strong. These are your abdominal and back muscles.   Sleep without a pillow under your head.   Pain is the best way to  the pace you should set in increasing your activity and exercise. Minor discomfort, stiffness, soreness, and mild aches need not interfere with activity. However, limit your activities temporarily if:   Your symptoms return.   The pain increases when you  are more active.   The pain increases within 24 hours after a new or higher level of activity.   When can I return to my normal activities?   Everyone recovers from an injury at a different rate. Return to your activities depends on how soon your back recovers, not by how many days or weeks it has been since your injury has occurred. In general, the longer you have symptoms before you start treatment, the longer it will take to get better. The goal is to return to your normal activities as soon as is safely possible. If you return too soon you may worsen your injury.   It is important that you have fully recovered from your low back pain before you return to any strenuous activity. You must be able to have the same range of motion that you had before your injury. You must be able to walk and twist without pain.   What can I do to help prevent low back pain?   You can reduce the strain on your back by doing the following:   Don't push with your arms when you move a heavy object. Turn around and push backwards so the strain is taken by your legs.   Whenever you sit, sit in a straight-backed chair and hold your spine against the back of the chair.   Bend your knees and hips and keep your back straight when you lift a heavy object.   Avoid lifting heavy objects higher than your waist.   Hold packages you carry close to your body, with your arms bent.   Use a footrest for one foot when you stand or sit in one spot for a long time. This keeps your back straight.   Bend your knees when you bend over.   Sit close to the pedals when you drive and use your seat belt and a hard backrest or pillow.   Lie on your side with your knees bent when you sleep or rest. It may help to put a pillow between your knees.   Put a pillow under your knees when you sleep on your back.   Raise the foot of the bed 8 inches to discourage sleeping on your stomach unless you have other problems that require that you keep your head elevated.   To  rest your back, hold each of these positions for 5?minutes or longer:   Lie on your back, bend your knees, and put pillows under your knees.   Lie on your back on the floor with a pillow under your neck. Bend your knees to a 90-degree angle, and put your lower legs and feet on a chair.   Lie on your back, bend your knees, and bring one knee up to your chest and hold it there. Repeat with the other knee, then bring both knees to your chest. When holding your knee to your chest, grab your thigh rather than your lower leg to avoid over flexing your knee.     Published by [employer].  This content is reviewed periodically and is subject to change as new health information becomes available. The information is intended to inform and educate and is not a replacement for medical evaluation, advice, diagnosis or treatment by a healthcare professional.   Developed by Mahsa Apple RN, [place], and [facility] [place].   ? 2010 [facility] and/or its affiliates. All Rights Reserved.           Low Back Pain Exercise          Standing hamstring stretch: Put the heel of one leg on a stool about 15 inches high. Keep your leg straight. Lean forward, bending at the hips until you feel a mild stretch in the back of your thigh. Make sure you do not roll your shoulders or bend at the waist when doing this. You want to stretch your leg, not your lower back. Hold the stretch for 15 to 30 seconds. Repeat with each leg 3 times.   Cat and camel: Get down on your hands and knees. Let your stomach sag, allowing your back to curve downward. Hold this position for 5 seconds. Then arch your back and hold for 5 seconds. Do 3 sets of 10.   Quadruped arm and leg raise: Get down on your hands and knees. Pull in your belly button and tighten your abdominal muscles to stiffen your spine. While keeping your abdominals tight, raise one arm and the opposite leg away from you. Hold this position for 5 seconds. Lower your arm and leg slowly and change sides. Do  this 10 times on each side.   Pelvic tilt: Lie on your back with your knees bent and your feet flat on the floor. Tighten your abdominal muscles and push your lower back into the floor. Hold this position for 5 seconds, then relax. Do 3 sets of 10.   Partial curl: Lie on your back with your knees bent and your feet flat on the floor. Tighten your stomach muscles. Tuck your chin to your chest. With your hands stretched out in front of you, curl your upper body forward until your shoulders clear the floor. Hold this position for 3 seconds. Don't hold your breath. It helps to breathe out as you lift your shoulders up. Relax back to the floor. Repeat 10 times. Build to 3 sets of 10. To challenge yourself, clasp your hands behind your head and keep your elbows out to the side.   Gluteal stretch: Lie on your back with both knees bent. Rest the ankle of one leg over the knee of your other leg. Grasp the thigh of the bottom leg and pull toward your chest. You will feel a stretch along the buttocks and possibly along the outside of your hip. Hold the stretch for 15 to 30 seconds. Repeat 3 times with each leg.   Extension exercise:   0. Lie face down on the floor for 5 minutes. If this hurts too much, lie face down with a pillow under your stomach. This should relieve your leg or back pain. When you can lie on your stomach for 5 minutes without a pillow, you can continue with Part B of this exercise.   0. After lying on your stomach for 5 minutes, prop yourself up on your elbows for another 5 minutes. If you can do this without having more leg or buttock pain, you can start doing part C of this exercise.   0. Lie on your stomach with your hands under your shoulders. Then press down on your hands and extend your elbows while keeping your hips flat on the floor. Hold for 1 second and lower yourself to the floor. Do 3 to 5 sets of 10 repetitions. Rest for 1 minute between sets. You should have no pain in your legs when you do  this, but it is normal to feel some pain in your lower back.   Do this exercise several times a day.   Side plank: Lie on your side with your legs, hips, and shoulders in a straight line. Prop yourself up onto your forearm so your elbow is directly under your shoulder. Lift your hips off the floor and balance on your forearm and the outside of your foot. Try to hold this position for 15 seconds, then slowly lower your hip to the ground. Switch sides and repeat. Work up to holding for 1 minute or longer. This exercise can be made easier by starting with your knees and hips flexed toward your chest.   Published by [employer].  This content is reviewed periodically and is subject to change as new health information becomes available. The information is intended to inform and educate and is not a replacement for medical evaluation, advice, diagnosis or treatment by a healthcare professional.   Written by Zoila Euceda, MS, PT, and Mahsa Chester PT, [facility], [facility], for [facility]   ? 2010 [facility] and/or its affiliates. All Rights Reserved.         Copyright   [employer] 2011                      Follow-ups after your visit        Additional Services     PHYSICAL THERAPY REFERRAL       *This therapy referral will be filtered to a centralized scheduling office at [facility] and the patient will receive a call to schedule an appointment at a [place] location most convenient for them. *     [facility] provides Physical Therapy evaluation and treatment and many specialty services across the [facility].  If requesting a specialty program, please choose from the list below.    If you have not heard from the scheduling office within 2 business days, please call [phone] for all locations, with the exception of [place], please call [phone] and [facility], please call [phone]  Treatment: Evaluation & Treatment  Special Instructions/Modalities: low  "back / sciatica  Special Programs: as needed based on assessment.    Please be aware that coverage of these services is subject to the terms and limitations of your health insurance plan.  Call member services at your health plan with any benefit or coverage questions.      **Note to Provider:  If you are referring outside of [place] for the therapy appointment, please list the name of the location in the \"special instructions\" above, print the referral and give to the patient to schedule the appointment.                  Future tests that were ordered for you today     Open Future Orders        Priority Expected Expires Ordered    MR Lumbar Spine w/o Contrast Routine  2/22/2019 2/22/2018            Who to contact     If you have questions or need follow up information about today's clinic visit or your schedule please contact [facility] directly at [phone].  Normal or non-critical lab and imaging results will be communicated to you by [employer], letter or phone within 4 business days after the clinic has received the results. If you do not hear from us within 7 days, please contact the clinic through [employer] or phone. If you have a critical or abnormal lab result, we will notify you by phone as soon as possible.  Submit refill requests through [employer] or call your pharmacy and they will forward the refill request to us. Please allow 3 business days for your refill to be completed.          Additional Information About Your Visit        [employer] Information     [employer] gives you secure access to your electronic health record. If you see a primary care provider, you can also send messages to your care team and make appointments. If you have questions, please call your primary care clinic.  If you do not have a primary care provider, please call [phone] and they will assist you.        Care EveryWhere ID     This is your Care EveryWhere ID. This could be used by other organizations to access " "your [place] medical records  [id number]        Your Vitals Were     Pulse Temperature Respirations Height BMI (Body Mass Index)       72 98  F (36.7  C) (Temporal) 20 5' 7.13\" (1.705 m) 31.68 kg/m2        Blood Pressure from Last 3 Encounters:   02/22/18 104/72   10/31/17 120/66   10/04/16 113/76    Weight from Last 3 Encounters:   02/22/18 203 lb (92.1 kg)   10/31/17 199 lb (90.3 kg)   10/04/16 209 lb (94.8 kg)              We Performed the Following     PHYSICAL THERAPY REFERRAL     TOBACCO CESSATION - FOR HEALTH MAINTENANCE        Primary Care Provider Office Phone # Fax #    Jay Goodwin PA-C [phone]       [place] [address]        Equal Access to Services     SALVADOR KITCHEN : Hadii aad ku hadasho Soceline, waaxda luqadaha, qaybta kaalmada adepabloyada, kaveh easton . So [facility] [phone].    ATENCIÓN: Si habla español, tiene a choudhary disposición servicios gratuitos de asistencia lingüística. Renita al [phone].    We comply with applicable federal civil rights laws and [place] laws. We do not discriminate on the basis of race, color, national origin, age, disability, sex, sexual orientation, or gender identity.            Thank you!     Thank you for choosing [facility]  for your care. Our goal is always to provide you with excellent care. Hearing back from our patients is one way we can continue to improve our services. Please take a few minutes to complete the written survey that you may receive in the mail after your visit with us. Thank you!             Your Updated Medication List - Protect others around you: Learn how to safely use, store and throw away your medicines at www.disposemymeds.org.          This list is accurate as of 2/22/18  4:01 PM.  Always use your most recent med list.                   Brand Name Dispense Instructions for use Diagnosis    albuterol 108 (90 BASE) MCG/ACT Inhaler    PROAIR " HFA    1 Inhaler    Inhale 2 puffs into the lungs every 4 hours as needed    Moderate persistent asthma without complication       budesonide-formoterol 160-4.5 MCG/ACT Inhaler    SYMBICORT    3 Inhaler    Inhale 2 puffs into the lungs 2 times daily    Moderate persistent asthma without complication       Fluticasone-Salmeterol 232-14 MCG/ACT Aepb inhaler    AIRDUO RESPICLICK    1 each    Inhale 1 puff into the lungs 2 times daily    Moderate persistent asthma without complication

## 2018-02-22 NOTE — NURSING NOTE
"Chief Complaint   Patient presents with     Back Pain     Musculoskeletal Problem     hip pain     Panel Management     alli, flu shot, tobacco cessation, act       Initial /72 (Cuff Size: Adult Regular)  Pulse 72  Temp 98  F (36.7  C) (Temporal)  Resp 20  Ht 5' 7.13\" (1.705 m)  Wt 203 lb (92.1 kg)  BMI 31.68 kg/m2 Estimated body mass index is 31.68 kg/(m^2) as calculated from the following:    Height as of this encounter: 5' 7.13\" (1.705 m).    Weight as of this encounter: 203 lb (92.1 kg).  Medication Reconciliation: complete  "

## 2018-02-23 ASSESSMENT — ASTHMA QUESTIONNAIRES: ACT_TOTALSCORE: 22

## 2018-03-12 ENCOUNTER — TELEPHONE (OUTPATIENT)
Dept: FAMILY MEDICINE | Facility: OTHER | Age: 38
End: 2018-03-12

## 2018-03-12 ENCOUNTER — HOSPITAL ENCOUNTER (OUTPATIENT)
Dept: MRI IMAGING | Facility: CLINIC | Age: 38
Discharge: HOME | End: 2018-03-12
Attending: PHYSICIAN ASSISTANT | Admitting: PHYSICIAN ASSISTANT
Payer: COMMERCIAL

## 2018-03-12 ENCOUNTER — EXTERNAL ORDER RESULTS (OUTPATIENT)
Dept: FAMILY MEDICINE | Facility: OTHER | Age: 38
End: 2018-03-12

## 2018-03-12 DIAGNOSIS — G89.29 CHRONIC MIDLINE LOW BACK PAIN WITHOUT SCIATICA: Primary | ICD-10-CM

## 2018-03-12 DIAGNOSIS — M54.40 BILATERAL LOW BACK PAIN WITH SCIATICA, SCIATICA LATERALITY UNSPECIFIED, UNSPECIFIED CHRONICITY: ICD-10-CM

## 2018-03-12 DIAGNOSIS — R93.7 ABNORMAL MAGNETIC RESONANCE IMAGING OF LUMBAR SPINE: ICD-10-CM

## 2018-03-12 DIAGNOSIS — M54.50 CHRONIC MIDLINE LOW BACK PAIN WITHOUT SCIATICA: Primary | ICD-10-CM

## 2018-03-12 DIAGNOSIS — M79.18 CHRONIC BUTTOCK PAIN: ICD-10-CM

## 2018-03-12 DIAGNOSIS — G89.29 CHRONIC BUTTOCK PAIN: ICD-10-CM

## 2018-03-12 PROCEDURE — 72148 MRI LUMBAR SPINE W/O DYE: CPT

## 2018-03-19 ENCOUNTER — TELEPHONE (OUTPATIENT)
Dept: FAMILY MEDICINE | Facility: OTHER | Age: 38
End: 2018-03-19

## 2018-03-19 NOTE — TELEPHONE ENCOUNTER
Patient given message below.  Patient stated he does not use his mychart which is where message was left. Patient requested that mychart be deactivated.   Aby Dillon CMA ([facility])

## 2018-03-19 NOTE — TELEPHONE ENCOUNTER
Reason for Call:  Other MRI results     Detailed comments: pt states had MRI on 03/12/18 and is following up on results. Please advise and contact pt for follow up     Phone Number Patient can be reached at: Home number on file [phone] (home)    Best Time: ANY    Can we leave a detailed message on this number? YES    Call taken on 3/19/2018 at 8:07 AM by Clarisse Lockett

## 2018-03-19 NOTE — TELEPHONE ENCOUNTER
Notes Recorded by Jay Goodwin PA-C on 3/12/2018 at 5:09 PM  You have been referred to: Spine Lumbar: Medical Spine Specialist: [facility]: [facility] [phone]  [email or web address]    ------    Notes Recorded by Jay Goodwin PA-C on 3/12/2018 at 5:06 PM  Theo:  Your MRI does not show us a lot at this point in time however there is enough evidence that you could have some nerve pinching in your low back that I think it would be worthwhile that you sit down with a spine specialist to discuss this further.  They can review your MRI with you and help you make a decision as to what to do going forward.  Electronically signed:    Jay Goodwin PA-C

## 2018-08-22 DIAGNOSIS — J45.40 MODERATE PERSISTENT ASTHMA WITHOUT COMPLICATION: ICD-10-CM

## 2018-08-22 NOTE — TELEPHONE ENCOUNTER
Symbicort:  Routing refill request to provider for review/approval because:  Drug not active on patient's medication list    Susana Fisher, RN, BSN

## 2018-08-23 RX ORDER — BUDESONIDE AND FORMOTEROL FUMARATE DIHYDRATE 160; 4.5 UG/1; UG/1
2 AEROSOL RESPIRATORY (INHALATION) 2 TIMES DAILY
Qty: 1 INHALER | Refills: 0 | Status: SHIPPED | OUTPATIENT
Start: 2018-08-23 | End: 2018-09-19

## 2018-09-17 NOTE — PROGRESS NOTES
SUBJECTIVE:   Theo Pozo is a 38 year old male who presents to clinic today for the following health issues:      History of Present Illness     Asthma:     Cough::  No    Wheezing::  No    Dyspnea::  YES    Use of rescue inhaler::  None    Taking Asthma medication as prescribed::  Yes    Asthma triggers::  Cold air, Humidity and Upper respiratory infections    ER/UC Visits or Admissions::  None    Diet:  Regular (no restrictions)  Frequency of exercise:  None  Taking medications regularly:  Yes  Medication side effects:  None  Additional concerns today:  No    Answers for HPI/ROS submitted by the patient on 9/19/2018   PHQ-2 Score: 0    ACT Total Scores 10/4/2016 10/31/2017 2/22/2018   ACT TOTAL SCORE - - -   ASTHMA ER VISITS - - -   ASTHMA HOSPITALIZATIONS - - -   ACT TOTAL SCORE (Goal Greater than or Equal to 20) 21 23 22   In the past 12 months, how many times did you visit the emergency room for your asthma without being admitted to the hospital? 0 0 0   In the past 12 months, how many times were you hospitalized overnight because of your asthma? 0 0 0     Smoking he has smoked cigarettes for 23 years.  He has had some aching in the bottom of his rib cage on both of his sides recently and he starts to worry about lung cancer when there is any pain around or near his lungs.  He has not had any coughing and denies coughing up blood at any point in time.  Denies shortness of breath outside of his asthma symptoms.  He has not been any recent.he  accidents or had any injuries to his rib cage.  He has not been doing any heavy lifting recently.      Problem list and histories reviewed & adjusted, as indicated.  Additional history: as documented    BP Readings from Last 3 Encounters:   09/19/18 124/64   02/22/18 104/72   10/31/17 120/66    Wt Readings from Last 3 Encounters:   09/19/18 198 lb 11.2 oz (90.1 kg)   02/22/18 203 lb (92.1 kg)   10/31/17 199 lb (90.3 kg)                  Labs reviewed in  "EPIC    ROS:  Constitutional, HEENT, cardiovascular, pulmonary, GI, , musculoskeletal, neuro, skin, endocrine and psych systems are negative, except as otherwise noted.    OBJECTIVE:     /64  Pulse 69  Temp 97.1  F (36.2  C) (Temporal)  Resp 12  Ht 5' 7.13\" (1.705 m)  Wt 198 lb 11.2 oz (90.1 kg)  SpO2 99%  BMI 31 kg/m2  Body mass index is 31 kg/(m^2).  GENERAL: healthy, alert and no distress  NECK: no adenopathy, no asymmetry, masses, or scars and thyroid normal to palpation  RESP: lungs clear to auscultation - no rales, rhonchi or wheezesmild tenderness to palpation on the lateral  CV: regular rate and rhythm, normal S1 S2, no S3 or S4, no murmur, click or rub  ABDOMEN: soft, nontender, no hepatosplenomegaly, no masses and bowel sounds normal  MS: rib cage mild tenderness to palpation at base of ribs at the lateral aspect bilaterally; otherwise no gross musculoskeletal defects noted, no edema  SKIN: no suspicious lesions or rashes  NEURO: Normal strength and tone, mentation intact and speech normal  PSYCH: mentation appears normal, affect normal/bright    Diagnostic Test Results:  none     ASSESSMENT/PLAN:     1. Moderate persistent asthma without complication  Stable.  Advair works better than Symbicort so we will see how much this costs through his new insurance.  If  Advair is cost prohibitive he may fill the Symbicort instead.  Both sent to the pharmacy so they could run both through his insurance.  - budesonide-formoterol (SYMBICORT) 160-4.5 MCG/ACT Inhaler; Inhale 2 puffs into the lungs 2 times daily  Dispense: 1 Inhaler; Refill: 11  - albuterol (PROAIR HFA) 108 (90 Base) MCG/ACT inhaler; Inhale 2 puffs into the lungs every 6 hours as needed  Dispense: 1 Inhaler; Refill: 3  - fluticasone-salmeterol (ADVAIR) 500-50 MCG/DOSE diskus inhaler; Inhale 1 puff into the lungs 2 times daily  Dispense: 1 Inhaler; Refill: 11    2. Cigarette nicotine dependence without complication  Patient is starting to " think about smoking cessation.  I discussed options and he is interested in trying Chantix and/or nicotine replacement lozenges.  Recommended he set a quit date and then start with the smoking cessation prescriptions.  If he does start the Chantix it is okay for refills in the future if he is tolerating the medication well.  - nicotine polacrilex (EQ NICOTINE POLACRILEX) 4 MG lozenge; Place 1 lozenge (4 mg) inside cheek as needed for smoking cessation  Dispense: 108 tablet; Refill: 3  - varenicline (CHANTIX STARTING MONTH CASPER) 0.5 MG X 11 & 1 MG X 42 tablet; Take 0.5 mg tab daily for 3 days, then 0.5 mg tab twice daily for 4 days, then 1 mg twice daily.  Dispense: 53 tablet; Refill: 0    3. Rib pain  Suspect pain is due to costochondritis.  Recommended patient continue to monitor symptoms and return to clinic if symptoms worsen or persist.    SHAHAB [facility]

## 2018-09-19 ENCOUNTER — OFFICE VISIT (OUTPATIENT)
Dept: FAMILY MEDICINE | Facility: OTHER | Age: 38
End: 2018-09-19

## 2018-09-19 VITALS
WEIGHT: 198.7 LBS | BODY MASS INDEX: 31.19 KG/M2 | RESPIRATION RATE: 12 BRPM | HEART RATE: 69 BPM | SYSTOLIC BLOOD PRESSURE: 124 MMHG | HEIGHT: 67 IN | TEMPERATURE: 97.1 F | OXYGEN SATURATION: 99 % | DIASTOLIC BLOOD PRESSURE: 64 MMHG

## 2018-09-19 DIAGNOSIS — F17.210 CIGARETTE NICOTINE DEPENDENCE WITHOUT COMPLICATION: ICD-10-CM

## 2018-09-19 DIAGNOSIS — R07.81 RIB PAIN: ICD-10-CM

## 2018-09-19 DIAGNOSIS — J45.40 MODERATE PERSISTENT ASTHMA WITHOUT COMPLICATION: Primary | ICD-10-CM

## 2018-09-19 PROCEDURE — 99214 OFFICE O/P EST MOD 30 MIN: CPT | Performed by: STUDENT IN AN ORGANIZED HEALTH CARE EDUCATION/TRAINING PROGRAM

## 2018-09-19 RX ORDER — ALBUTEROL SULFATE 90 UG/1
2 AEROSOL, METERED RESPIRATORY (INHALATION) EVERY 6 HOURS PRN
Qty: 1 INHALER | Refills: 3 | Status: SHIPPED | OUTPATIENT
Start: 2018-09-19 | End: 2019-08-28

## 2018-09-19 RX ORDER — FLUTICASONE PROPIONATE AND SALMETEROL 232; 14 UG/1; UG/1
1 POWDER, METERED RESPIRATORY (INHALATION) 2 TIMES DAILY
Status: CANCELLED | OUTPATIENT
Start: 2018-09-19

## 2018-09-19 RX ORDER — BUDESONIDE AND FORMOTEROL FUMARATE DIHYDRATE 160; 4.5 UG/1; UG/1
2 AEROSOL RESPIRATORY (INHALATION) 2 TIMES DAILY
Qty: 1 INHALER | Refills: 11 | Status: SHIPPED | OUTPATIENT
Start: 2018-09-19 | End: 2019-08-28

## 2018-09-19 ASSESSMENT — PAIN SCALES - GENERAL: PAINLEVEL: MODERATE PAIN (5)

## 2018-09-19 NOTE — MR AVS SNAPSHOT
"              After Visit Summary   9/19/2018    Theo Pozo    MRN: [id number]           Patient Information     Date Of Birth          1980        Visit Information        Provider Department      9/19/2018 8:20 AM Joslyn Ramachandran APRN CNP [facility]        Today's Diagnoses     Screening for HIV (human immunodeficiency virus)    -  1    Moderate persistent asthma without complication        Cigarette nicotine dependence without complication           Follow-ups after your visit        Who to contact     If you have questions or need follow up information about today's clinic visit or your schedule please contact [facility] directly at [phone].  Normal or non-critical lab and imaging results will be communicated to you by MyChart, letter or phone within 4 business days after the clinic has received the results. If you do not hear from us within 7 days, please contact the clinic through MyChart or phone. If you have a critical or abnormal lab result, we will notify you by phone as soon as possible.  Submit refill requests through [facility] or call your pharmacy and they will forward the refill request to us. Please allow 3 business days for your refill to be completed.          Additional Information About Your Visit        Care EveryWhere ID     This is your Care EveryWhere ID. This could be used by other organizations to access your Theodore medical records  [id number]        Your Vitals Were     Pulse Temperature Respirations Height Pulse Oximetry BMI (Body Mass Index)    69 97.1  F (36.2  C) (Temporal) 12 5' 7.13\" (1.705 m) 99% 31 kg/m2       Blood Pressure from Last 3 Encounters:   09/19/18 124/64   02/22/18 104/72   10/31/17 120/66    Weight from Last 3 Encounters:   09/19/18 198 lb 11.2 oz (90.1 kg)   02/22/18 203 lb (92.1 kg)   10/31/17 199 lb (90.3 kg)              Today, you had the following     No orders found for display         Today's " Medication Changes          These changes are accurate as of 9/19/18  8:49 AM.  If you have any questions, ask your nurse or doctor.               Start taking these medicines.        Dose/Directions    nicotine polacrilex 4 MG lozenge   Commonly known as:  EQ NICOTINE POLACRILEX   Used for:  Cigarette nicotine dependence without complication   Started by:  Joslyn Ramachandran APRN CNP        Dose:  4 mg   Place 1 lozenge (4 mg) inside cheek as needed for smoking cessation   Quantity:  108 tablet   Refills:  3       varenicline 0.5 MG X 11 & 1 MG X 42 tablet   Commonly known as:  CHANTIX STARTING MONTH CASPER   Used for:  Cigarette nicotine dependence without complication   Started by:  Joslyn Ramachandran APRN CNP        Take 0.5 mg tab daily for 3 days, then 0.5 mg tab twice daily for 4 days, then 1 mg twice daily.   Quantity:  53 tablet   Refills:  0         These medicines have changed or have updated prescriptions.        Dose/Directions    albuterol 108 (90 Base) MCG/ACT inhaler   Commonly known as:  PROAIR HFA   This may have changed:  when to take this   Used for:  Moderate persistent asthma without complication   Changed by:  Joslyn Ramachandran APRN CNP        Dose:  2 puff   Inhale 2 puffs into the lungs every 6 hours as needed   Quantity:  1 Inhaler   Refills:  3       * fluticasone-salmeterol 232-14 MCG/ACT inhaler   Commonly known as:  AIRDUO RESPICLICK   This may have changed:  Another medication with the same name was added. Make sure you understand how and when to take each.   Used for:  Moderate persistent asthma without complication   Changed by:  Joslyn Ramachandran APRN CNP        Dose:  1 puff   Inhale 1 puff into the lungs 2 times daily   Quantity:  1 each   Refills:  1       * fluticasone-salmeterol 500-50 MCG/DOSE diskus inhaler   Commonly known as:  ADVAIR   This may have changed:  You were already taking a medication with the same name, and this prescription  was added. Make sure you understand how and when to take each.   Used for:  Moderate persistent asthma without complication   Changed by:  Joslyn Ramachandran APRN CNP        Dose:  1 puff   Inhale 1 puff into the lungs 2 times daily   Quantity:  1 Inhaler   Refills:  11       * Notice:  This list has 2 medication(s) that are the same as other medications prescribed for you. Read the directions carefully, and ask your doctor or other care provider to review them with you.         Where to get your medicines      These medications were sent to [facility] - [address]  [address] [id number]     Phone:  [phone]     albuterol 108 (90 Base) MCG/ACT inhaler    fluticasone-salmeterol 500-50 MCG/DOSE diskus inhaler    nicotine polacrilex 4 MG lozenge    varenicline 0.5 MG X 11 & 1 MG X 42 tablet         Some of these will need a paper prescription and others can be bought over the counter.  Ask your nurse if you have questions.     Bring a paper prescription for each of these medications     budesonide-formoterol 160-4.5 MCG/ACT Inhaler                Primary Care Provider Office Phone # Fax #    Jay Goodwin PA-C [phone] [address] [place] [address]        Equal Access to Services     SALVADOR KITCHEN : Hadii karthikeyan ku hadasho Soomaali, waaxda luqadaha, qaybta kaalmada adeegyada, kaveh aguilar. So [facility] [phone].    ATENCIÓN: Si habla español, tiene a choudhary disposición servicios gratuitos de asistencia lingüística. Llame al [phone].    We comply with applicable federal civil rights laws and [place] laws. We do not discriminate on the basis of race, color, national origin, age, disability, sex, sexual orientation, or gender identity.            Thank you!     Thank you for choosing [facility]  for your care. Our goal is always to provide you with excellent care. Hearing back  from our patients is one way we can continue to improve our services. Please take a few minutes to complete the written survey that you may receive in the mail after your visit with us. Thank you!             Your Updated Medication List - Protect others around you: Learn how to safely use, store and throw away your medicines at www.disposemymeds.org.          This list is accurate as of 9/19/18  8:49 AM.  Always use your most recent med list.                   Brand Name Dispense Instructions for use Diagnosis    albuterol 108 (90 Base) MCG/ACT inhaler    PROAIR HFA    1 Inhaler    Inhale 2 puffs into the lungs every 6 hours as needed    Moderate persistent asthma without complication       budesonide-formoterol 160-4.5 MCG/ACT Inhaler    SYMBICORT    1 Inhaler    Inhale 2 puffs into the lungs 2 times daily    Moderate persistent asthma without complication       * fluticasone-salmeterol 232-14 MCG/ACT inhaler    AIRDUO RESPICLICK    1 each    Inhale 1 puff into the lungs 2 times daily    Moderate persistent asthma without complication       * fluticasone-salmeterol 500-50 MCG/DOSE diskus inhaler    ADVAIR    1 Inhaler    Inhale 1 puff into the lungs 2 times daily    Moderate persistent asthma without complication       nicotine polacrilex 4 MG lozenge    EQ NICOTINE POLACRILEX    108 tablet    Place 1 lozenge (4 mg) inside cheek as needed for smoking cessation    Cigarette nicotine dependence without complication       varenicline 0.5 MG X 11 & 1 MG X 42 tablet    CHANTIX STARTING MONTH CASPER    53 tablet    Take 0.5 mg tab daily for 3 days, then 0.5 mg tab twice daily for 4 days, then 1 mg twice daily.    Cigarette nicotine dependence without complication       * Notice:  This list has 2 medication(s) that are the same as other medications prescribed for you. Read the directions carefully, and ask your doctor or other care provider to review them with you.

## 2018-09-20 ASSESSMENT — ASTHMA QUESTIONNAIRES: ACT_TOTALSCORE: 22

## 2018-11-02 NOTE — TELEPHONE ENCOUNTER
PRIOR AUTHORIZATION needed for   ADVAIR DISKUS 250/50[facility] [place]    Ph#[phone]  ID#[id number]    Thank you!  Elisabeth CowartR)       Partially impaired: cannot see medication labels or newsprint, but can see obstacles in path, and the surrounding layout; can count fingers at arm's length

## 2019-08-26 NOTE — PROGRESS NOTES
Subjective     Theo Pozo is a 39 year old male who presents to clinic today for the following health issues:    History of Present Illness      Asthma:  He presents for follow up of asthma.  He has no cough, no wheezing, and no shortness of breath. He is not using a relief medication. He does not miss any doses of his controller medication throughout the week.Patient is aware of the following triggers: dust mites, humidity and mold. The patient has not had a visit to the Emergency Room, Urgent Care or Hospital due to asthma since the last clinic visit.     He eats 0-1 servings of fruits and vegetables daily.He consumes 2 sweetened beverage(s) daily.  He is taking medications regularly.     Asthma Follow-Up    Was ACT completed today?    Yes    ACT Total Scores 8/28/2019   ACT TOTAL SCORE -   ASTHMA ER VISITS -   ASTHMA HOSPITALIZATIONS -   ACT TOTAL SCORE (Goal Greater than or Equal to 20) 23   In the past 12 months, how many times did you visit the emergency room for your asthma without being admitted to the hospital? 0   In the past 12 months, how many times were you hospitalized overnight because of your asthma? 0       How many days per week do you miss taking your asthma controller medication?  0    Please describe any recent triggers for your asthma: None    Have you had any Emergency Room Visits, Urgent Care Visits, or Hospital Admissions since your last office visit?  No    Concern - Thumb infection  Onset: 4 months    Description:   Right thumb    Intensity: 10/10 if he hits it the wrong way, but in general 5/10    Progression of Symptoms:  worsening    Accompanying Signs & Symptoms:   Fingernail is pushed up   No oozing    Previous history of similar problem:   no    Precipitating factors:   Worsened by: touching it    Alleviating factors:  Improved by: NA    Therapies Tried and outcome: NA    He has no known injury to the nail. It started out by turning yellowish and then progressively thickening.      Patient Active Problem List   Diagnosis     CARDIOVASCULAR SCREENING; LDL GOAL LESS THAN 160     Epidermal inclusion cyst     Moderate persistent asthma without complication     Tobacco abuse disorder     Rib pain     Past Surgical History:   Procedure Laterality Date     ARTHROSCOPY KNEE RT/LT      Right knee for meniscal tear     ENDOSCOPY  2005    negatve findings     SURGICAL HISTORY OF -       nasal surgery for epistaxis and obstruction       Social History     Tobacco Use     Smoking status: Current Every Day Smoker     Packs/day: 1.00     Years: 14.00     Pack years: 14.00     Types: Cigarettes     Smokeless tobacco: Former User     Tobacco comment: Advised to quit    Substance Use Topics     Alcohol use: Yes     Alcohol/week: 0.0 oz     Comment: social infrequently     Family History   Problem Relation Age of Onset     Gastrointestinal Disease Mother         Crohn's disease         Current Outpatient Medications   Medication Sig Dispense Refill     albuterol (PROAIR HFA) 108 (90 Base) MCG/ACT inhaler Inhale 2 puffs into the lungs every 6 hours as needed 1 Inhaler 3     budesonide-formoterol (SYMBICORT) 160-4.5 MCG/ACT Inhaler Inhale 2 puffs into the lungs 2 times daily 1 Inhaler 11     terbinafine (LAMISIL) 250 MG tablet Take 1 tablet (250 mg) by mouth daily 90 tablet 0     Allergies   Allergen Reactions     Penicillins      Sulfa Drugs      BP Readings from Last 3 Encounters:   08/28/19 122/70   09/19/18 124/64   02/22/18 104/72    Wt Readings from Last 3 Encounters:   08/28/19 95.3 kg (210 lb)   09/19/18 90.1 kg (198 lb 11.2 oz)   02/22/18 92.1 kg (203 lb)                    Reviewed and updated as needed this visit by Provider         Review of Systems   ROS COMP: Constitutional, HEENT, cardiovascular, pulmonary, GI, , musculoskeletal, neuro, skin, endocrine and psych systems are negative, except as otherwise noted.      Objective    /70   Pulse 85   Temp 97.1  F (36.2  C) (Temporal)   Resp  "16   Ht 1.705 m (5' 7.13\")   Wt 95.3 kg (210 lb)   SpO2 96%   BMI 32.76 kg/m    Body mass index is 32.76 kg/m .  Physical Exam   GENERAL: healthy, alert and no distress  NECK: no adenopathy, no asymmetry, masses, or scars and thyroid normal to palpation  RESP: lungs clear to auscultation - no rales, rhonchi or wheezes  CV: regular rate and rhythm, normal S1 S2, no S3 or S4, no murmur, click or rub  MS: no gross musculoskeletal defects noted, no edema  SKIN: thick yellow outer thumb nail with mild erythema of cuticle at base and tenderness to palpation   NEURO: Normal strength and tone, mentation intact and speech normal  PSYCH: mentation appears normal, affect normal/bright    Diagnostic Test Results:  Labs reviewed in Epic  Results for orders placed or performed in visit on 08/28/19 (from the past 24 hour(s))   KOH prep (Other than skin, nails, hair)   Result Value Ref Range    Specimen Description Fingernail     KOH Prep Fungal elements seen (A)            Assessment & Plan     1. Onychomycosis  Start treatment with oral antifungal. Reviewed potential side effects. Check baseline liver function today. May take 3+ months for complete healing.  - Hepatic panel (Albumin, ALT, AST, Bili, Alk Phos, TP)  - terbinafine (LAMISIL) 250 MG tablet; Take 1 tablet (250 mg) by mouth daily  Dispense: 90 tablet; Refill: 0    2. Moderate persistent asthma without complication  Stable. Continue current medication(s) and dose(s).   - budesonide-formoterol (SYMBICORT) 160-4.5 MCG/ACT Inhaler; Inhale 2 puffs into the lungs 2 times daily  Dispense: 1 Inhaler; Refill: 11  - albuterol (PROAIR HFA) 108 (90 Base) MCG/ACT inhaler; Inhale 2 puffs into the lungs every 6 hours as needed  Dispense: 1 Inhaler; Refill: 3    3. Tobacco dependence syndrome  Recommended smoking cessation  - TOBACCO CESSATION ORDER FOR     4. Admission for therapeutic drug monitoring  - Hepatic panel (Albumin, ALT, AST, Bili, Alk Phos, TP)    5. Nail " "hypertrophy  - KOH prep (Other than skin, nails, hair)     Tobacco Cessation:   reports that he has been smoking cigarettes.  He has a 14.00 pack-year smoking history. He has quit using smokeless tobacco.  Tobacco Cessation Action Plan: Information offered: Patient not interested at this time      BMI:   Estimated body mass index is 32.76 kg/m  as calculated from the following:    Height as of this encounter: 1.705 m (5' 7.13\").    Weight as of this encounter: 95.3 kg (210 lb).   Weight management plan: Discussed healthy diet and exercise guidelines    No follow-ups on file.    SHAHAB [facility]    "

## 2019-08-28 ENCOUNTER — OFFICE VISIT (OUTPATIENT)
Dept: FAMILY MEDICINE | Facility: OTHER | Age: 39
End: 2019-08-28
Payer: COMMERCIAL

## 2019-08-28 VITALS
TEMPERATURE: 97.1 F | SYSTOLIC BLOOD PRESSURE: 122 MMHG | HEART RATE: 85 BPM | DIASTOLIC BLOOD PRESSURE: 70 MMHG | RESPIRATION RATE: 16 BRPM | WEIGHT: 210 LBS | HEIGHT: 67 IN | BODY MASS INDEX: 32.96 KG/M2 | OXYGEN SATURATION: 96 %

## 2019-08-28 DIAGNOSIS — J45.40 MODERATE PERSISTENT ASTHMA WITHOUT COMPLICATION: ICD-10-CM

## 2019-08-28 DIAGNOSIS — L60.2 NAIL HYPERTROPHY: ICD-10-CM

## 2019-08-28 DIAGNOSIS — Z51.81 ADMISSION FOR THERAPEUTIC DRUG MONITORING: ICD-10-CM

## 2019-08-28 DIAGNOSIS — B35.1 ONYCHOMYCOSIS: Primary | ICD-10-CM

## 2019-08-28 DIAGNOSIS — F17.200 TOBACCO DEPENDENCE SYNDROME: ICD-10-CM

## 2019-08-28 LAB
ALBUMIN SERPL-MCNC: 3.8 G/DL (ref 3.4–5)
ALP SERPL-CCNC: 94 U/L (ref 40–150)
ALT SERPL W P-5'-P-CCNC: 45 U/L (ref 0–70)
AST SERPL W P-5'-P-CCNC: 20 U/L (ref 0–45)
BILIRUB DIRECT SERPL-MCNC: <0.1 MG/DL (ref 0–0.2)
BILIRUB SERPL-MCNC: 0.3 MG/DL (ref 0.2–1.3)
KOH PREP SPEC: ABNORMAL
PROT SERPL-MCNC: 7.1 G/DL (ref 6.8–8.8)
SPECIMEN SOURCE: ABNORMAL

## 2019-08-28 PROCEDURE — 99214 OFFICE O/P EST MOD 30 MIN: CPT | Performed by: STUDENT IN AN ORGANIZED HEALTH CARE EDUCATION/TRAINING PROGRAM

## 2019-08-28 PROCEDURE — 36415 COLL VENOUS BLD VENIPUNCTURE: CPT | Performed by: STUDENT IN AN ORGANIZED HEALTH CARE EDUCATION/TRAINING PROGRAM

## 2019-08-28 PROCEDURE — 87210 SMEAR WET MOUNT SALINE/INK: CPT | Performed by: STUDENT IN AN ORGANIZED HEALTH CARE EDUCATION/TRAINING PROGRAM

## 2019-08-28 PROCEDURE — 80076 HEPATIC FUNCTION PANEL: CPT | Performed by: STUDENT IN AN ORGANIZED HEALTH CARE EDUCATION/TRAINING PROGRAM

## 2019-08-28 RX ORDER — TERBINAFINE HYDROCHLORIDE 250 MG/1
250 TABLET ORAL DAILY
Qty: 90 TABLET | Refills: 0 | Status: SHIPPED | OUTPATIENT
Start: 2019-08-28 | End: 2019-08-28

## 2019-08-28 RX ORDER — ALBUTEROL SULFATE 90 UG/1
2 AEROSOL, METERED RESPIRATORY (INHALATION) EVERY 6 HOURS PRN
Qty: 1 INHALER | Refills: 3 | Status: SHIPPED | OUTPATIENT
Start: 2019-08-28 | End: 2020-03-27

## 2019-08-28 RX ORDER — BUDESONIDE AND FORMOTEROL FUMARATE DIHYDRATE 160; 4.5 UG/1; UG/1
2 AEROSOL RESPIRATORY (INHALATION) 2 TIMES DAILY
Qty: 1 INHALER | Refills: 11 | Status: SHIPPED | OUTPATIENT
Start: 2019-08-28 | End: 2020-09-15

## 2019-08-28 RX ORDER — TERBINAFINE HYDROCHLORIDE 250 MG/1
250 TABLET ORAL DAILY
Qty: 90 TABLET | Refills: 0 | Status: SHIPPED | OUTPATIENT
Start: 2019-08-28 | End: 2021-05-10

## 2019-08-28 ASSESSMENT — ASTHMA QUESTIONNAIRES
QUESTION_1 LAST FOUR WEEKS HOW MUCH OF THE TIME DID YOUR ASTHMA KEEP YOU FROM GETTING AS MUCH DONE AT WORK, SCHOOL OR AT HOME: NONE OF THE TIME
QUESTION_2 LAST FOUR WEEKS HOW OFTEN HAVE YOU HAD SHORTNESS OF BREATH: NOT AT ALL
QUESTION_4 LAST FOUR WEEKS HOW OFTEN HAVE YOU USED YOUR RESCUE INHALER OR NEBULIZER MEDICATION (SUCH AS ALBUTEROL): NOT AT ALL
QUESTION_3 LAST FOUR WEEKS HOW OFTEN DID YOUR ASTHMA SYMPTOMS (WHEEZING, COUGHING, SHORTNESS OF BREATH, CHEST TIGHTNESS OR PAIN) WAKE YOU UP AT NIGHT OR EARLIER THAN USUAL IN THE MORNING: ONCE OR TWICE
QUESTION_5 LAST FOUR WEEKS HOW WOULD YOU RATE YOUR ASTHMA CONTROL: WELL CONTROLLED
ACT_TOTALSCORE: 23

## 2019-08-28 ASSESSMENT — MIFFLIN-ST. JEOR: SCORE: 1818.24

## 2019-08-28 NOTE — LETTER
My Asthma Action Plan    Name: Theo Pozo   YOB: 1980  Date: 8/28/2019   My doctor: SHAHAB Leon CNP   My clinic: [facility]        My Control Medicine: Budesonide + formoterol (Symbicort HFA) -  160/4.5 mcg bid  My Rescue Medicine: Albuterol (Proair/Ventolin/Proventil HFA) 2-4 puffs EVERY 4 HOURS as needed. Use a spacer if recommended by your provider.  My Oral Steroid Medicine: prednisone 40 mg daily for 5 days My Asthma Severity:   Mild Persistent  Know your asthma triggers: upper respiratory infections, dust mites, pollens, mold and exercise or sports              GREEN ZONE   Good Control    I feel good    No cough or wheeze    Can work, sleep and play without asthma symptoms       Take your asthma control medicine every day.     1. If exercise triggers your asthma, take your rescue medication    15 minutes before exercise or sports, and    During exercise if you have asthma symptoms  2. Spacer to use with inhaler: If you have a spacer, make sure to use it with your inhaler             YELLOW ZONE Getting Worse  I have ANY of these:    I do not feel good    Cough or wheeze    Chest feels tight    Wake up at night   1. Keep taking your Green Zone medications  2. Start taking your rescue medicine:    every 20 minutes for up to 1 hour. Then every 4 hours for 24-48 hours.  3. If you stay in the Yellow Zone for more than 12-24 hours, contact your doctor.  4. If you do not return to the Green Zone in 12-24 hours or you get worse, start taking your oral steroid medicine if prescribed by your provider.           RED ZONE Medical Alert - Get Help  I have ANY of these:    I feel awful    Medicine is not helping    Breathing getting harder    Trouble walking or talking    Nose opens wide to breathe       1. Take your rescue medicine NOW  2. If your provider has prescribed an oral steroid medicine, start taking it NOW  3. Call your doctor NOW  4. If you are still in the Red  Zone after 20 minutes and you have not reached your doctor:    Take your rescue medicine again and    Call 911 or go to the emergency room right away    See your regular doctor within 2 weeks of an Emergency Room or Urgent Care visit for follow-up treatment.          Annual Reminders:  Meet with Asthma Educator,  Flu Shot in the Fall, consider Pneumonia Vaccination for patients with asthma (aged 19 and older).    Pharmacy:    [id number] - [place], [place] - [address] [id number] - [place] [facility], [place] - [id number] [facility]                          Asthma Triggers  How To Control Things That Make Your Asthma Worse    Triggers are things that make your asthma worse.  Look at the list below to help you find your triggers and what you can do about them.  You can help prevent asthma flare-ups by staying away from your triggers.      Trigger                                                          What you can do   Cigarette Smoke  Tobacco smoke can make asthma worse. Do not allow smoking in your home, car or around you.  Be sure no one smokes at a child s day care or school.  If you smoke, ask your health care provider for ways to help you quit.  Ask family members to quit too.  Ask your health care provider for a referral to Quit Plan to help you quit smoking, or call 2-109-887-PLAN.     Colds, Flu, Bronchitis  These are common triggers of asthma. Wash your hands often.  Don t touch your eyes, nose or mouth.  Get a flu shot every year.     Dust Mites  These are tiny bugs that live in cloth or carpet. They are too small to see. Wash sheets and blankets in hot water every week.   Encase pillows and mattress in dust mite proof covers.  Avoid having carpet if you can. If you have carpet, vacuum weekly.   Use a dust mask and HEPA vacuum.   Pollen and Outdoor Mold  Some people are allergic to trees, grass, or weed pollen, or molds. Try to keep your windows closed.  Limit time out doors when pollen count  is high.   Ask you health care provider about taking medicine during allergy season.     Animal Dander  Some people are allergic to skin flakes, urine or saliva from pets with fur or feathers. Keep pets with fur or feathers out of your home.    If you can t keep the pet outdoors, then keep the pet out of your bedroom.  Keep the bedroom door closed.  Keep pets off cloth furniture and away from stuffed toys.     Mice, Rats, and Cockroaches   Some people are allergic to the waste from these pests.   Cover food and garbage.  Clean up spills and food crumbs.  Store grease in the refrigerator.   Keep food out of the bedroom.   Indoor Mold  This can be a trigger if your home has high moisture. Fix leaking faucets, pipes, or other sources of water.   Clean moldy surfaces.  Dehumidify basement if it is damp and smelly.   Smoke, Strong Odors, and Sprays  These can reduce air quality. Stay away from strong odors and sprays, such as perfume, powder, hair spray, paints, smoke incense, paint, cleaning products, candles and new carpet.   Exercise or Sports  Some people with asthma have this trigger. Be active!  Ask your doctor about taking medicine before sports or exercise to prevent symptoms.    Warm up for 5-10 minutes before and after sports or exercise.     Other Triggers of Asthma  Cold air:  Cover your nose and mouth with a scarf.  Sometimes laughing or crying can be a trigger.  Some medicines and food can trigger asthma.

## 2019-08-29 ASSESSMENT — ASTHMA QUESTIONNAIRES: ACT_TOTALSCORE: 23

## 2019-11-04 NOTE — PROGRESS NOTES
SUBJECTIVE:   CC: Theo Pozo is an 39 year old male who presents for preventative health visit.     Healthy Habits:     Getting at least 3 servings of Calcium per day:  NO    Bi-annual eye exam:  Yes    Dental care twice a year:  NO    Sleep apnea or symptoms of sleep apnea:  Daytime drowsiness and Excessive snoring    Diet:  Regular (no restrictions)    Frequency of exercise:  None    Taking medications regularly:  Yes    Medication side effects:  None    PHQ-2 Total Score: 1    Additional concerns today:  Yes      Today's PHQ-2 Score:   PHQ-2 ( 1999 Pfizer) 11/7/2019   Q1: Little interest or pleasure in doing things 1   Q2: Feeling down, depressed or hopeless 0   PHQ-2 Score 1   Q1: Little interest or pleasure in doing things Several days   Q2: Feeling down, depressed or hopeless Not at all   PHQ-2 Score 1       Abuse: Current or Past(Physical, Sexual or Emotional)- No  Do you feel safe in your environment? Yes        Social History     Tobacco Use     Smoking status: Current Every Day Smoker     Packs/day: 1.00     Years: 14.00     Pack years: 14.00     Types: Cigarettes     Smokeless tobacco: Former User     Tobacco comment: Advised to quit    Substance Use Topics     Alcohol use: Yes     Alcohol/week: 0.0 standard drinks     Comment: social infrequently         Alcohol Use 11/7/2019   Prescreen: >3 drinks/day or >7 drinks/week? No   Prescreen: >3 drinks/day or >7 drinks/week? -   No flowsheet data found.    Last PSA: No results found for: PSA    Reviewed orders with patient. Reviewed health maintenance and updated orders accordingly - Yes  Lab work is in process  Labs reviewed in EPIC  BP Readings from Last 3 Encounters:   11/07/19 110/66   08/28/19 122/70   09/19/18 124/64    Wt Readings from Last 3 Encounters:   11/07/19 97.1 kg (214 lb)   08/28/19 95.3 kg (210 lb)   09/19/18 90.1 kg (198 lb 11.2 oz)                  Patient Active Problem List   Diagnosis     Epidermal inclusion cyst     Moderate  persistent asthma without complication     Tobacco abuse disorder     Rib pain     Hyperlipidemia LDL goal <130     Malaise and fatigue     Snoring     Past Surgical History:   Procedure Laterality Date     ARTHROSCOPY KNEE RT/LT      Right knee for meniscal tear     ENDOSCOPY  2005    negatve findings     SURGICAL HISTORY OF -       nasal surgery for epistaxis and obstruction       Social History     Tobacco Use     Smoking status: Current Every Day Smoker     Packs/day: 1.00     Years: 14.00     Pack years: 14.00     Types: Cigarettes     Smokeless tobacco: Former User     Tobacco comment: Advised to quit    Substance Use Topics     Alcohol use: Yes     Alcohol/week: 0.0 standard drinks     Comment: social infrequently     Family History   Problem Relation Age of Onset     Gastrointestinal Disease Mother         Crohn's disease         Current Outpatient Medications   Medication Sig Dispense Refill     albuterol (PROAIR HFA) 108 (90 Base) MCG/ACT inhaler Inhale 2 puffs into the lungs every 6 hours as needed 1 Inhaler 3     budesonide-formoterol (SYMBICORT) 160-4.5 MCG/ACT Inhaler Inhale 2 puffs into the lungs 2 times daily 1 Inhaler 11     terbinafine (LAMISIL) 250 MG tablet Take 1 tablet (250 mg) by mouth daily 90 tablet 0     Allergies   Allergen Reactions     Penicillins      Sulfa Drugs      Recent Labs   Lab Test 08/28/19  0805 08/05/16  0842   LDL  --  137*   HDL  --  39*   TRIG  --  149   ALT 45  --    CR  --  0.96   GFRESTIMATED  --  87   GFRESTBLACK  --  >90   GFR Calc     POTASSIUM  --  4.3        Reviewed and updated as needed this visit by clinical staff  Tobacco  Allergies  Meds  Med Hx  Surg Hx  Fam Hx  Soc Hx        Reviewed and updated as needed this visit by Provider        Past Medical History:   Diagnosis Date     Heartburn      Moderate persistent asthma      Tobacco abuse disorder 10/31/2017      Past Surgical History:   Procedure Laterality Date     ARTHROSCOPY KNEE  "RT/LT      Right knee for meniscal tear     ENDOSCOPY  2005    negatve findings     SURGICAL HISTORY OF -       nasal surgery for epistaxis and obstruction       Review of Systems   Constitutional: Negative for chills and fever.   HENT: Positive for hearing loss. Negative for congestion, ear pain and sore throat.    Eyes: Negative for pain and visual disturbance.   Respiratory: Negative for cough and shortness of breath.    Cardiovascular: Negative for chest pain, palpitations and peripheral edema.   Gastrointestinal: Negative for abdominal pain, constipation, diarrhea, heartburn, hematochezia and nausea.   Genitourinary: Negative for discharge, dysuria, frequency, genital sores, hematuria, impotence and urgency.   Musculoskeletal: Positive for arthralgias and myalgias. Negative for joint swelling.   Skin: Negative for rash.   Neurological: Negative for dizziness, weakness, headaches and paresthesias.   Psychiatric/Behavioral: Positive for mood changes. The patient is not nervous/anxious.        OBJECTIVE:   /66   Pulse 76   Temp 97.6  F (36.4  C) (Temporal)   Resp 16   Ht 1.705 m (5' 7.13\")   Wt 97.1 kg (214 lb)   SpO2 96%   BMI 33.39 kg/m      Physical Exam  GENERAL: healthy, alert and no distress  EYES: Eyes grossly normal to inspection, PERRL and conjunctivae and sclerae normal  HENT: ear canals and TM's normal, nose and mouth without ulcers or lesions  NECK: no adenopathy, no asymmetry, masses, or scars and thyroid normal to palpation  RESP: lungs clear to auscultation - no rales, rhonchi or wheezes  CV: regular rate and rhythm, normal S1 S2, no S3 or S4, no murmur, click or rub, no peripheral edema and peripheral pulses strong  ABDOMEN: soft, nontender, no hepatosplenomegaly, no masses and bowel sounds normal  MS: no gross musculoskeletal defects noted, no edema  SKIN: no suspicious lesions or rashes  NEURO: Normal strength and tone, mentation intact and speech normal  PSYCH: mentation appears " normal, affect normal/bright    Diagnostic Test Results:  Labs reviewed in Epic  No results found for this or any previous visit (from the past 24 hour(s)).    ASSESSMENT/PLAN:   1. Routine general medical examination at a health care facility  Routine physical exam labs discussed and noted below.  - CBC with platelets  - Comprehensive metabolic panel  - Lipid panel reflex to direct LDL Fasting  - *UA reflex to Microscopic and Culture ([facility] (except [place] and [place])  - Vitamin D Deficiency  - TSH with free T4 reflex    2. Moderate persistent asthma without complication  Doing well no new concerns follow-up PRN.  Strongly advised smoking cessation.  - CBC with platelets  - Comprehensive metabolic panel  - Lipid panel reflex to direct LDL Fasting  - *UA reflex to Microscopic and Culture ([facility] (except [place] and [place])  - Vitamin D Deficiency  - TSH with free T4 reflex    3. Hyperlipidemia LDL goal <130    - CBC with platelets  - Comprehensive metabolic panel  - Lipid panel reflex to direct LDL Fasting  - *UA reflex to Microscopic and Culture ([facility] (except [place] and [place])  - Vitamin D Deficiency  - TSH with free T4 reflex    4. Malaise and fatigue  5. Tobacco abuse disorder  6. Snoring  - CBC with platelets  - Comprehensive metabolic panel  - Lipid panel reflex to direct LDL Fasting  - *UA reflex to Microscopic and Culture ([facility] (except [place] and [place])  - Vitamin D Deficiency  - TSH with free T4 reflex  - SLEEP EVALUATION & MANAGEMENT REFERRAL - ADULT -[facility] - [place] [phone] (Age 13 and up if over 100 lbs); Future    I have concerns related to potential for sleep apnea for this individual.  Follow-up if needed based on results as noted above.      COUNSELING:   Reviewed preventive health counseling, as reflected in patient instructions       Regular exercise       Healthy  "diet/nutrition       Vision screening       Hearing screening    Estimated body mass index is 33.39 kg/m  as calculated from the following:    Height as of this encounter: 1.705 m (5' 7.13\").    Weight as of this encounter: 97.1 kg (214 lb).     Weight management plan: Patient was referred to their PCP to discuss a diet and exercise plan.     reports that he has been smoking cigarettes. He has a 14.00 pack-year smoking history. He has quit using smokeless tobacco.  Tobacco Cessation Action Plan: Information offered: Patient not interested at this time    Counseling Resources:  ATP IV Guidelines  Pooled Cohorts Equation Calculator  FRAX Risk Assessment  ICSI Preventive Guidelines  Dietary Guidelines for Americans, 2010  USDA's MyPlate  ASA Prophylaxis  Lung CA Screening    Jay Bernstein PA-C  [facility]  "

## 2019-11-07 ENCOUNTER — OFFICE VISIT (OUTPATIENT)
Dept: FAMILY MEDICINE | Facility: OTHER | Age: 39
End: 2019-11-07
Payer: COMMERCIAL

## 2019-11-07 VITALS
OXYGEN SATURATION: 96 % | HEIGHT: 67 IN | WEIGHT: 214 LBS | RESPIRATION RATE: 16 BRPM | DIASTOLIC BLOOD PRESSURE: 66 MMHG | SYSTOLIC BLOOD PRESSURE: 110 MMHG | HEART RATE: 76 BPM | TEMPERATURE: 97.6 F | BODY MASS INDEX: 33.59 KG/M2

## 2019-11-07 DIAGNOSIS — E78.5 HYPERLIPIDEMIA LDL GOAL <130: ICD-10-CM

## 2019-11-07 DIAGNOSIS — Z00.00 ROUTINE GENERAL MEDICAL EXAMINATION AT A HEALTH CARE FACILITY: Primary | ICD-10-CM

## 2019-11-07 DIAGNOSIS — J45.40 MODERATE PERSISTENT ASTHMA WITHOUT COMPLICATION: ICD-10-CM

## 2019-11-07 DIAGNOSIS — Z72.0 TOBACCO ABUSE DISORDER: ICD-10-CM

## 2019-11-07 DIAGNOSIS — R53.83 MALAISE AND FATIGUE: ICD-10-CM

## 2019-11-07 DIAGNOSIS — R06.83 SNORING: ICD-10-CM

## 2019-11-07 DIAGNOSIS — R53.81 MALAISE AND FATIGUE: ICD-10-CM

## 2019-11-07 LAB
ALBUMIN SERPL-MCNC: 4 G/DL (ref 3.4–5)
ALBUMIN UR-MCNC: NEGATIVE MG/DL
ALP SERPL-CCNC: 97 U/L (ref 40–150)
ALT SERPL W P-5'-P-CCNC: 42 U/L (ref 0–70)
ANION GAP SERPL CALCULATED.3IONS-SCNC: 7 MMOL/L (ref 3–14)
APPEARANCE UR: CLEAR
AST SERPL W P-5'-P-CCNC: 15 U/L (ref 0–45)
BILIRUB SERPL-MCNC: 0.4 MG/DL (ref 0.2–1.3)
BILIRUB UR QL STRIP: NEGATIVE
BUN SERPL-MCNC: 13 MG/DL (ref 7–30)
CALCIUM SERPL-MCNC: 8.8 MG/DL (ref 8.5–10.1)
CHLORIDE SERPL-SCNC: 109 MMOL/L (ref 94–109)
CHOLEST SERPL-MCNC: 240 MG/DL
CO2 SERPL-SCNC: 26 MMOL/L (ref 20–32)
COLOR UR AUTO: YELLOW
CREAT SERPL-MCNC: 1 MG/DL (ref 0.66–1.25)
DEPRECATED CALCIDIOL+CALCIFEROL SERPL-MC: 20 UG/L (ref 20–75)
ERYTHROCYTE [DISTWIDTH] IN BLOOD BY AUTOMATED COUNT: 14.3 % (ref 10–15)
GFR SERPL CREATININE-BSD FRML MDRD: >90 ML/MIN/{1.73_M2}
GLUCOSE SERPL-MCNC: 95 MG/DL (ref 70–99)
GLUCOSE UR STRIP-MCNC: NEGATIVE MG/DL
HCT VFR BLD AUTO: 51.5 % (ref 40–53)
HDLC SERPL-MCNC: 46 MG/DL
HGB BLD-MCNC: 16.7 G/DL (ref 13.3–17.7)
HGB UR QL STRIP: NEGATIVE
KETONES UR STRIP-MCNC: NEGATIVE MG/DL
LDLC SERPL CALC-MCNC: 172 MG/DL
LEUKOCYTE ESTERASE UR QL STRIP: NEGATIVE
MCH RBC QN AUTO: 29.5 PG (ref 26.5–33)
MCHC RBC AUTO-ENTMCNC: 32.4 G/DL (ref 31.5–36.5)
MCV RBC AUTO: 91 FL (ref 78–100)
NITRATE UR QL: NEGATIVE
NONHDLC SERPL-MCNC: 194 MG/DL
PH UR STRIP: 5 PH (ref 5–7)
PLATELET # BLD AUTO: 270 10E9/L (ref 150–450)
POTASSIUM SERPL-SCNC: 4.5 MMOL/L (ref 3.4–5.3)
PROT SERPL-MCNC: 7.4 G/DL (ref 6.8–8.8)
RBC # BLD AUTO: 5.67 10E12/L (ref 4.4–5.9)
SODIUM SERPL-SCNC: 142 MMOL/L (ref 133–144)
SOURCE: NORMAL
SP GR UR STRIP: >1.03 (ref 1–1.03)
TRIGL SERPL-MCNC: 111 MG/DL
TSH SERPL DL<=0.005 MIU/L-ACNC: 1.66 MU/L (ref 0.4–4)
UROBILINOGEN UR STRIP-ACNC: 0.2 EU/DL (ref 0.2–1)
WBC # BLD AUTO: 10.4 10E9/L (ref 4–11)

## 2019-11-07 PROCEDURE — 36415 COLL VENOUS BLD VENIPUNCTURE: CPT | Performed by: PHYSICIAN ASSISTANT

## 2019-11-07 PROCEDURE — 99396 PREV VISIT EST AGE 40-64: CPT | Performed by: PHYSICIAN ASSISTANT

## 2019-11-07 PROCEDURE — 81003 URINALYSIS AUTO W/O SCOPE: CPT | Performed by: PHYSICIAN ASSISTANT

## 2019-11-07 PROCEDURE — 82306 VITAMIN D 25 HYDROXY: CPT | Performed by: PHYSICIAN ASSISTANT

## 2019-11-07 PROCEDURE — 99214 OFFICE O/P EST MOD 30 MIN: CPT | Mod: 25 | Performed by: PHYSICIAN ASSISTANT

## 2019-11-07 PROCEDURE — 80053 COMPREHEN METABOLIC PANEL: CPT | Performed by: PHYSICIAN ASSISTANT

## 2019-11-07 PROCEDURE — 84443 ASSAY THYROID STIM HORMONE: CPT | Performed by: PHYSICIAN ASSISTANT

## 2019-11-07 PROCEDURE — 80061 LIPID PANEL: CPT | Performed by: PHYSICIAN ASSISTANT

## 2019-11-07 PROCEDURE — 85027 COMPLETE CBC AUTOMATED: CPT | Performed by: PHYSICIAN ASSISTANT

## 2019-11-07 ASSESSMENT — ENCOUNTER SYMPTOMS
HEADACHES: 0
HEARTBURN: 0
SHORTNESS OF BREATH: 0
SORE THROAT: 0
FREQUENCY: 0
PALPITATIONS: 0
EYE PAIN: 0
ARTHRALGIAS: 1
CHILLS: 0
NAUSEA: 0
WEAKNESS: 0
HEMATURIA: 0
NERVOUS/ANXIOUS: 0
PARESTHESIAS: 0
DIZZINESS: 0
MYALGIAS: 1
JOINT SWELLING: 0
FEVER: 0
HEMATOCHEZIA: 0
DIARRHEA: 0
DYSURIA: 0
CONSTIPATION: 0
COUGH: 0
ABDOMINAL PAIN: 0

## 2019-11-07 ASSESSMENT — PAIN SCALES - GENERAL: PAINLEVEL: MILD PAIN (3)

## 2019-11-07 ASSESSMENT — MIFFLIN-ST. JEOR: SCORE: 1836.39

## 2019-11-08 ASSESSMENT — ASTHMA QUESTIONNAIRES: ACT_TOTALSCORE: 23

## 2019-11-11 ENCOUNTER — TELEPHONE (OUTPATIENT)
Dept: FAMILY MEDICINE | Facility: OTHER | Age: 39
End: 2019-11-11

## 2019-11-11 DIAGNOSIS — E78.5 HYPERLIPIDEMIA: Primary | ICD-10-CM

## 2019-11-11 RX ORDER — ATORVASTATIN CALCIUM 20 MG/1
20 TABLET, FILM COATED ORAL DAILY
Qty: 90 TABLET | Refills: 0 | Status: SHIPPED | OUTPATIENT
Start: 2019-11-11 | End: 2020-10-29

## 2019-11-11 NOTE — TELEPHONE ENCOUNTER
Patient was given results and agreeable to medication.     Rn are you able to send this in for patient?    Chris Marte,     _____      Notes recorded by Jay Goodwin PA-C on 11/10/2019 at 10:01 PM CST  Other than cholesterol levels that are in desperate need of diet and exercise his labs are normal.  I'd advise he start Lipitor 20mg each night and recheck in 3 months.  Electronically signed:    Jay Goodwin PA-C

## 2019-11-20 ENCOUNTER — APPOINTMENT (OUTPATIENT)
Dept: GENERAL RADIOLOGY | Facility: CLINIC | Age: 39
End: 2019-11-20
Attending: FAMILY MEDICINE
Payer: COMMERCIAL

## 2019-11-20 ENCOUNTER — HOSPITAL ENCOUNTER (EMERGENCY)
Facility: CLINIC | Age: 39
Discharge: HOME | End: 2019-11-20
Attending: FAMILY MEDICINE | Admitting: FAMILY MEDICINE
Payer: COMMERCIAL

## 2019-11-20 VITALS
RESPIRATION RATE: 30 BRPM | OXYGEN SATURATION: 96 % | BODY MASS INDEX: 34.32 KG/M2 | TEMPERATURE: 98.3 F | HEART RATE: 63 BPM | SYSTOLIC BLOOD PRESSURE: 129 MMHG | WEIGHT: 220 LBS | DIASTOLIC BLOOD PRESSURE: 82 MMHG

## 2019-11-20 DIAGNOSIS — R07.9 ACUTE CHEST PAIN: ICD-10-CM

## 2019-11-20 LAB
ALBUMIN SERPL-MCNC: 3.4 G/DL (ref 3.4–5)
ALP SERPL-CCNC: 87 U/L (ref 40–150)
ALT SERPL W P-5'-P-CCNC: 43 U/L (ref 0–70)
ANION GAP SERPL CALCULATED.3IONS-SCNC: 5 MMOL/L (ref 3–14)
AST SERPL W P-5'-P-CCNC: 17 U/L (ref 0–45)
BASOPHILS # BLD AUTO: 0 10E9/L (ref 0–0.2)
BASOPHILS NFR BLD AUTO: 0.4 %
BILIRUB SERPL-MCNC: 0.4 MG/DL (ref 0.2–1.3)
BUN SERPL-MCNC: 10 MG/DL (ref 7–30)
CALCIUM SERPL-MCNC: 8.3 MG/DL (ref 8.5–10.1)
CHLORIDE SERPL-SCNC: 109 MMOL/L (ref 94–109)
CO2 SERPL-SCNC: 27 MMOL/L (ref 20–32)
CREAT SERPL-MCNC: 0.81 MG/DL (ref 0.66–1.25)
D DIMER PPP FEU-MCNC: 0.3 UG/ML FEU (ref 0–0.5)
DIFFERENTIAL METHOD BLD: NORMAL
EOSINOPHIL NFR BLD AUTO: 4 %
ERYTHROCYTE [DISTWIDTH] IN BLOOD BY AUTOMATED COUNT: 13.5 % (ref 10–15)
GFR SERPL CREATININE-BSD FRML MDRD: >90 ML/MIN/{1.73_M2}
GLUCOSE SERPL-MCNC: 101 MG/DL (ref 70–99)
HCT VFR BLD AUTO: 46.9 % (ref 40–53)
HGB BLD-MCNC: 15.6 G/DL (ref 13.3–17.7)
IMM GRANULOCYTES # BLD: 0 10E9/L (ref 0–0.4)
IMM GRANULOCYTES NFR BLD: 0.2 %
LIPASE SERPL-CCNC: 231 U/L (ref 73–393)
LYMPHOCYTES # BLD AUTO: 3.9 10E9/L (ref 0.8–5.3)
LYMPHOCYTES NFR BLD AUTO: 35.7 %
MCH RBC QN AUTO: 30.4 PG (ref 26.5–33)
MCHC RBC AUTO-ENTMCNC: 33.3 G/DL (ref 31.5–36.5)
MCV RBC AUTO: 91 FL (ref 78–100)
MONOCYTES # BLD AUTO: 0.9 10E9/L (ref 0–1.3)
MONOCYTES NFR BLD AUTO: 8 %
NEUTROPHILS # BLD AUTO: 5.7 10E9/L (ref 1.6–8.3)
NEUTROPHILS NFR BLD AUTO: 51.7 %
NRBC # BLD AUTO: 0 10*3/UL
NRBC BLD AUTO-RTO: 0 /100
PLATELET # BLD AUTO: 303 10E9/L (ref 150–450)
POTASSIUM SERPL-SCNC: 4 MMOL/L (ref 3.4–5.3)
PROT SERPL-MCNC: 6.4 G/DL (ref 6.8–8.8)
RBC # BLD AUTO: 5.14 10E12/L (ref 4.4–5.9)
SODIUM SERPL-SCNC: 141 MMOL/L (ref 133–144)
TROPONIN I SERPL-MCNC: <0.015 UG/L (ref 0–0.04)
TROPONIN I SERPL-MCNC: <0.015 UG/L (ref 0–0.04)
WBC # BLD AUTO: 11 10E9/L (ref 4–11)

## 2019-11-20 PROCEDURE — 93010 ELECTROCARDIOGRAM REPORT: CPT | Mod: 76 | Performed by: FAMILY MEDICINE

## 2019-11-20 PROCEDURE — 83690 ASSAY OF LIPASE: CPT | Performed by: FAMILY MEDICINE

## 2019-11-20 PROCEDURE — 84484 ASSAY OF TROPONIN QUANT: CPT | Mod: 91 | Performed by: FAMILY MEDICINE

## 2019-11-20 PROCEDURE — 93005 ELECTROCARDIOGRAM TRACING: CPT | Mod: 76 | Performed by: FAMILY MEDICINE

## 2019-11-20 PROCEDURE — 99285 EMERGENCY DEPT VISIT HI MDM: CPT | Mod: 25 | Performed by: FAMILY MEDICINE

## 2019-11-20 PROCEDURE — 93005 ELECTROCARDIOGRAM TRACING: CPT | Performed by: FAMILY MEDICINE

## 2019-11-20 PROCEDURE — 80053 COMPREHEN METABOLIC PANEL: CPT | Performed by: FAMILY MEDICINE

## 2019-11-20 PROCEDURE — 71046 X-RAY EXAM CHEST 2 VIEWS: CPT | Mod: TC

## 2019-11-20 PROCEDURE — 25000132 ZZH RX MED GY IP 250 OP 250 PS 637: Performed by: FAMILY MEDICINE

## 2019-11-20 PROCEDURE — 85025 COMPLETE CBC W/AUTO DIFF WBC: CPT | Performed by: FAMILY MEDICINE

## 2019-11-20 PROCEDURE — 25000125 ZZHC RX 250: Performed by: FAMILY MEDICINE

## 2019-11-20 PROCEDURE — 93010 ELECTROCARDIOGRAM REPORT: CPT | Mod: Z6 | Performed by: FAMILY MEDICINE

## 2019-11-20 PROCEDURE — 85379 FIBRIN DEGRADATION QUANT: CPT | Performed by: FAMILY MEDICINE

## 2019-11-20 RX ORDER — ASPIRIN 81 MG/1
324 TABLET, CHEWABLE ORAL ONCE
Status: COMPLETED | OUTPATIENT
Start: 2019-11-20 | End: 2019-11-20

## 2019-11-20 RX ORDER — LORAZEPAM 2 MG/ML
0.5 INJECTION INTRAMUSCULAR ONCE
Status: DISCONTINUED | OUTPATIENT
Start: 2019-11-20 | End: 2019-11-20 | Stop reason: CLARIF

## 2019-11-20 RX ADMIN — LIDOCAINE HYDROCHLORIDE 30 ML: 20 SOLUTION ORAL; TOPICAL at 03:09

## 2019-11-20 RX ADMIN — ASPIRIN 81 MG 324 MG: 81 TABLET ORAL at 02:48

## 2019-11-20 NOTE — ED PROVIDER NOTES
History     Chief Complaint   Patient presents with     Chest Pain     HPI  Theo Pozo is a 39 year old male who presents to the ED this morning with chest discomfort for about the past hour or so.  He is not sure if it woke him up but he just does not feel right.  Describes the chest discomfort is a soft burn.  Had some soft sleepy feeling in his left arm and may be a little into his neck.  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  He works as a  at [employer] in [place].    Pain was about 6/10 at its worst.  Now 4/10.  No provocative or palliative components.    Recently had a physical and was prescribed Lipitor.  Was going to pick that up today and start taking it.  Also smokes a pack a day.  No family history that he is aware of.  No cardiac disease in his mom's side but is not sure of his dad's family history.  He is not diabetic and has no history of hypertension.  Does admit to being overweight, does not exercise and has some underlying asthma.  His primary provider suggested a sleep study as he is concerned about obstructive sleep apnea.    No recent illnesses.  No fevers chills or sweats.  No nausea vomiting or diarrhea.    Fair amount of stress in his life.  Somewhat recently  and now lives and sleeps alone.  Sometimes worries about what would happen if he became ill or something happened while he was alone.  He does get a bit anxious about that.    Allergies:  Allergies   Allergen Reactions     Penicillins      Sulfa Drugs        Problem List:    Patient Active Problem List    Diagnosis Date Noted     Hyperlipidemia LDL goal <130 11/07/2019     Priority: Medium     Malaise and fatigue 11/07/2019     Priority: Medium     Snoring 11/07/2019     Priority: Medium     Rib pain 09/19/2018     Priority: Medium     Tobacco abuse disorder 10/31/2017     Priority: Medium     Moderate  persistent asthma without complication 08/05/2016     Priority: Medium     Epidermal inclusion cyst 02/14/2012     Priority: Medium        Past Medical History:    Past Medical History:   Diagnosis Date     Heartburn      Moderate persistent asthma      Tobacco abuse disorder 10/31/2017       Past Surgical History:    Past Surgical History:   Procedure Laterality Date     ARTHROSCOPY KNEE RT/LT      Right knee for meniscal tear     ENDOSCOPY  2005    negatve findings     SURGICAL HISTORY OF -       nasal surgery for epistaxis and obstruction       Family History:    Family History   Problem Relation Age of Onset     Gastrointestinal Disease Mother         Crohn's disease       Social History:  Marital Status:  Single [1]  Social History     Tobacco Use     Smoking status: Current Every Day Smoker     Packs/day: 1.00     Years: 14.00     Pack years: 14.00     Types: Cigarettes     Smokeless tobacco: Former User     Tobacco comment: Advised to quit    Substance Use Topics     Alcohol use: Yes     Alcohol/week: 0.0 standard drinks     Comment: social infrequently     Drug use: No        Medications:    albuterol (PROAIR HFA) 108 (90 Base) MCG/ACT inhaler  atorvastatin (LIPITOR) 20 MG tablet  budesonide-formoterol (SYMBICORT) 160-4.5 MCG/ACT Inhaler  terbinafine (LAMISIL) 250 MG tablet          Review of Systems   All other systems reviewed and are negative.      Physical Exam   BP: 138/88  Pulse: 68  Heart Rate: 85  Temp: 98.3  F (36.8  C)  Resp: 15  Weight: 99.8 kg (220 lb)  SpO2: 96 %      Physical Exam  Constitutional:       General: He is not in acute distress.     Appearance: He is not diaphoretic.   HENT:      Head: Normocephalic.   Eyes:      Extraocular Movements: Extraocular movements intact.      Pupils: Pupils are equal, round, and reactive to light.   Cardiovascular:      Rate and Rhythm: Normal rate and regular rhythm.   Pulmonary:      Effort: Pulmonary effort is normal.      Breath sounds: Normal breath  sounds.   Chest:      Chest wall: No tenderness.   Abdominal:      General: Abdomen is flat.      Tenderness: There is no abdominal tenderness.   Musculoskeletal: Normal range of motion.         General: No swelling or tenderness.   Skin:     General: Skin is warm and dry.   Neurological:      General: No focal deficit present.      Mental Status: He is alert.   Psychiatric:         Mood and Affect: Mood normal.         ED Course  (with Medical Decision Making)      39-year-old with proxy 1 hour of chest discomfort which she describes as a soft burning with some mild symptoms in the left arm and possibly the neck.  No associated diaphoresis or shortness of breath.  He admits to possibly being a bit nervous and apprehensive because he feels like he is fast and out of shape and does not exercise and was recently in for physical and prescribed Lipitor which he was planning on taking up and starting today.  He also had a customer of his that had chest pain which became quite an ordeal and he thinks he might be just a bit more in tune to that.  He does smoke a pack a day as well.    His EKG shows no acute ischemic changes.  IV placed.  Labs drawn.  Full dose aspirin was given.    Since he describes his pain is a soft burning sensation, he was given a GI cocktail with some slight improvement.  Pain is now 2/10.  Initial troponin was undetectable.  CBC, comprehensive profile and lipase were all normal.  D-dimer was also unremarkable so we will get a chest x-ray rather than a CT scan.    We will plan on checking a second troponin at about 5:30 AM, approximately 4 hours after the onset of his symptoms.  We will give him a small dose of Ativan as well to see if that helps relieve his discomfort any further.  He is going to Xray right now.    Chest x-ray was unremarkable.    He is now pain-free and never required the Ativan.    Second troponin was also undetectable.  His HEART score is 2 putting him in a low risk category  supporting early discharge and outpatient follow-up.    Repeat EKG showed no change from previous and no acute ischemic changes.  He is anxious to get going as he needs to get his kids off to school.  I asked him to follow-up in clinic with Jay Bernstein PA-C next week and to start his Lipitor and try to stop smoking.  He is comfortable with this plan and feels much better.  Has been pain-free for the past 1.5-2 hours.            Procedures               EKG Interpretation:      Interpreted by Louis Aguilera MD  Time reviewed: 2:40 AM;  Symptoms at time of EKG: chest pain   Rhythm: normal sinus   Rate: 69  Axis: Normal  Ectopy: none  Conduction: normal  ST Segments/ T Waves: No acute ischemic changes  Q Waves: none  Comparison to prior: Unchanged from 3/19/2010    Clinical Impression: Sinus rhythm at 69 bpm.  No acute ischemic changes.  No change from previous EKG.             EKG Interpretation:      Interpreted by Louis Aguilera MD  Time reviewed:5:55 AM    Symptoms at time of EKG: None   Rhythm: Normal sinus   Rate: 60  Axis: Normal  Ectopy: None  Conduction: Normal  ST Segments/ T Waves: No ST-T wave changes and No acute ischemic changes  Q Waves: None  Comparison to prior: Unchanged from EKG from earlier this morning    Clinical Impression: normal EKG           Critical Care time:  none                 Results for orders placed or performed during the hospital encounter of 11/20/19 (from the past 24 hour(s))   CBC with platelets differential   Result Value Ref Range    WBC 11.0 4.0 - 11.0 10e9/L    RBC Count 5.14 4.4 - 5.9 10e12/L    Hemoglobin 15.6 13.3 - 17.7 g/dL    Hematocrit 46.9 40.0 - 53.0 %    MCV 91 78 - 100 fl    MCH 30.4 26.5 - 33.0 pg    MCHC 33.3 31.5 - 36.5 g/dL    RDW 13.5 10.0 - 15.0 %    Platelet Count 303 150 - 450 10e9/L    Diff Method Automated Method     % Neutrophils 51.7 %    % Lymphocytes 35.7 %    % Monocytes 8.0 %    % Eosinophils 4.0 %    % Basophils 0.4 %    %  Immature Granulocytes 0.2 %    Nucleated RBCs 0 0 /100    Absolute Neutrophil 5.7 1.6 - 8.3 10e9/L    Absolute Lymphocytes 3.9 0.8 - 5.3 10e9/L    Absolute Monocytes 0.9 0.0 - 1.3 10e9/L    Absolute Basophils 0.0 0.0 - 0.2 10e9/L    Abs Immature Granulocytes 0.0 0 - 0.4 10e9/L    Absolute Nucleated RBC 0.0    Troponin I   Result Value Ref Range    Troponin I ES <0.015 0.000 - 0.045 ug/L   Comprehensive metabolic panel   Result Value Ref Range    Sodium 141 133 - 144 mmol/L    Potassium 4.0 3.4 - 5.3 mmol/L    Chloride 109 94 - 109 mmol/L    Carbon Dioxide 27 20 - 32 mmol/L    Anion Gap 5 3 - 14 mmol/L    Glucose 101 (H) 70 - 99 mg/dL    Urea Nitrogen 10 7 - 30 mg/dL    Creatinine 0.81 0.66 - 1.25 mg/dL    GFR Estimate >90 >60 mL/min/[1.73_m2]    GFR Estimate If Black >90 >60 mL/min/[1.73_m2]    Calcium 8.3 (L) 8.5 - 10.1 mg/dL    Bilirubin Total 0.4 0.2 - 1.3 mg/dL    Albumin 3.4 3.4 - 5.0 g/dL    Protein Total 6.4 (L) 6.8 - 8.8 g/dL    Alkaline Phosphatase 87 40 - 150 U/L    ALT 43 0 - 70 U/L    AST 17 0 - 45 U/L   D dimer quantitative   Result Value Ref Range    D Dimer 0.3 0.0 - 0.50 ug/ml FEU   Lipase   Result Value Ref Range    Lipase 231 73 - 393 U/L   XR Chest 2 Views    Narrative    CHEST 2 VIEWS  11/20/2019 3:37 AM     HISTORY: Chest pain.    COMPARISON: 10/4/2016.    FINDINGS: The lungs are clear. Normal-sized cardiac silhouette.      Impression    IMPRESSION: No evidence of active cardiopulmonary disease.    TANYA GUZMAN MD   Troponin I   Result Value Ref Range    Troponin I ES <0.015 0.000 - 0.045 ug/L       Medications   aspirin (ASA) chewable tablet 324 mg (324 mg Oral Given 11/20/19 0248)   lidocaine (XYLOCAINE) 2 % 15 mL, alum & mag hydroxide-simethicone (MYLANTA ES/MAALOX  ES) 15 mL GI Cocktail (30 mLs Oral Given 11/20/19 0309)       Assessments & Plan (with Medical Decision Making)     I have reviewed the nursing notes.    I have reviewed the findings, diagnosis, plan and need for follow up with  the patient.       HEART Score  Background  Calculates the overall risk of adverse event in patient's presenting with chest pain.  Based on 5 criteria (each assigned 0-2 points) including suspiciousness of history, EKG, age, risk factors and troponin.    Data  39 year old male  has Epidermal inclusion cyst; Moderate persistent asthma without complication; Tobacco abuse disorder; Rib pain; Hyperlipidemia LDL goal <130; Malaise and fatigue; and Snoring on their problem list.   reports that he has been smoking cigarettes. He has a 14.00 pack-year smoking history. He has quit using smokeless tobacco.  family history includes Gastrointestinal Disease in his mother.  Lab Results   Component Value Date    TROPI <0.015 11/20/2019     Criteria   0-2 points for each of 5 items (maximum of 10 points):  Score 1- History moderately suspicious for coronary syndrome  Score 0- EKG Normal  Score 0- Age <45 years old  Score 1- One to 2 risk factors for atherosclerotic disease  Score 0- Within normal limits for troponin levels  Interpretation  Risk of adverse outcome  Heart Score: 2  Total Score 0-3- Adverse Outcome Risk 2.5% - Supports early discharge with appropriate follow-up        New Prescriptions    No medications on file       Final diagnoses:   Acute chest pain       11/20/2019   [facility] EMERGENCY DEPARTMENT     Louis Aguilera MD  11/20/19 0557

## 2019-11-20 NOTE — ED AVS SNAPSHOT
[facility] Emergency Department  [address] [place] [address]  Phone:  [phone]  Fax:  [phone]                                    Theo Pozo   MRN: [id number]    Department:  [facility] Emergency Department   Date of Visit:  11/20/2019           After Visit Summary Signature Page    I have received my discharge instructions, and my questions have been answered. I have discussed any challenges I see with this plan with the nurse or doctor.    ..........................................................................................................................................  Patient/Patient Representative Signature      ..........................................................................................................................................  Patient Representative Print Name and Relationship to Patient    ..................................................               ................................................  Date                                   Time    ..........................................................................................................................................  Reviewed by Signature/Title    ...................................................              ..............................................  Date                                               Time          22EPIC Rev 08/18

## 2019-11-20 NOTE — ED TRIAGE NOTES
Pt comes in with complaints of chest pain that started about 1 hour ago. Pt complains of radiation into his neck and diaphoresis.

## 2019-11-20 NOTE — DISCHARGE INSTRUCTIONS
Your heart tests were all normal this morning which is reassuring.  It is possible that your chest discomfort is related to anxiety related to everything going on in your life.  Try to stop smoking. It is the single most important thing you can do for your health going forward.  Start your Lipitor as ordered by Jay Bernstein PA-C.  Your HEART score was 2 which places you in a low risk category for adverse outcome risk and supports early discharge with outpatient follow-up.   Recheck in clinic next week.  Return to the ED if you worsen or have any concerns.  It was nice visiting with you this morning.  I am glad you are feeling better and hope you continue to do well.    Thank you for choosing [facility]. We appreciate the opportunity to meet your urgent medical needs. Please let us know if we could have done anything to make your stay more satisfying.    After discharge, please closely monitor for any new or worsening symptoms. Return to the Emergency Department if you develop any acute worsening signs or symptoms.    If you had lab work, cultures or imaging studies done during your stay, the final results may still be pending. We will call you if your plan of care needs to change. However, if you are not improving as expected, please follow up with your primary care provider or clinic.     Start any prescription medications that were prescribed to you and take them as directed.     Please see additional handouts that may be pertinent to your condition.

## 2019-12-29 ENCOUNTER — HOSPITAL ENCOUNTER (EMERGENCY)
Facility: CLINIC | Age: 39
Discharge: HOME | End: 2019-12-30
Attending: FAMILY MEDICINE | Admitting: FAMILY MEDICINE
Payer: COMMERCIAL

## 2019-12-29 DIAGNOSIS — R42 DIZZINESS: ICD-10-CM

## 2019-12-29 DIAGNOSIS — B34.9 VIRAL SYNDROME: ICD-10-CM

## 2019-12-29 DIAGNOSIS — G89.29 CHRONIC BILATERAL LOW BACK PAIN, UNSPECIFIED WHETHER SCIATICA PRESENT: ICD-10-CM

## 2019-12-29 DIAGNOSIS — R10.9 FLANK PAIN: ICD-10-CM

## 2019-12-29 DIAGNOSIS — M54.50 CHRONIC BILATERAL LOW BACK PAIN, UNSPECIFIED WHETHER SCIATICA PRESENT: ICD-10-CM

## 2019-12-29 DIAGNOSIS — H93.13 TINNITUS, BILATERAL: ICD-10-CM

## 2019-12-29 LAB
ALBUMIN UR-MCNC: 30 MG/DL
AMORPH CRY #/AREA URNS HPF: ABNORMAL /HPF
APPEARANCE UR: ABNORMAL
BACTERIA #/AREA URNS HPF: ABNORMAL /HPF
BILIRUB UR QL STRIP: NEGATIVE
COLOR UR AUTO: YELLOW
DEPRECATED S PYO AG THROAT QL EIA: NORMAL
GLUCOSE UR STRIP-MCNC: NEGATIVE MG/DL
HGB UR QL STRIP: NEGATIVE
KETONES UR STRIP-MCNC: NEGATIVE MG/DL
LEUKOCYTE ESTERASE UR QL STRIP: NEGATIVE
NITRATE UR QL: NEGATIVE
PH UR STRIP: 7 PH (ref 5–7)
RBC #/AREA URNS AUTO: 0 /HPF (ref 0–2)
SOURCE: ABNORMAL
SP GR UR STRIP: 1.02 (ref 1–1.03)
SPECIMEN SOURCE: NORMAL
UROBILINOGEN UR STRIP-MCNC: 4 MG/DL (ref 0–2)
WBC #/AREA URNS AUTO: 0 /HPF (ref 0–5)

## 2019-12-29 PROCEDURE — 81001 URINALYSIS AUTO W/SCOPE: CPT | Performed by: FAMILY MEDICINE

## 2019-12-29 PROCEDURE — 99283 EMERGENCY DEPT VISIT LOW MDM: CPT | Performed by: FAMILY MEDICINE

## 2019-12-29 PROCEDURE — 87880 STREP A ASSAY W/OPTIC: CPT | Performed by: FAMILY MEDICINE

## 2019-12-29 PROCEDURE — 99284 EMERGENCY DEPT VISIT MOD MDM: CPT | Mod: Z6 | Performed by: FAMILY MEDICINE

## 2019-12-29 PROCEDURE — 87081 CULTURE SCREEN ONLY: CPT | Performed by: FAMILY MEDICINE

## 2019-12-29 NOTE — ED AVS SNAPSHOT
[facility] Emergency Department  [address] [place] [address]  Phone:  [phone]  Fax:  [phone]                                    Theo Pozo   MRN: [id number]    Department:  [facility] Emergency Department   Date of Visit:  12/29/2019           After Visit Summary Signature Page    I have received my discharge instructions, and my questions have been answered. I have discussed any challenges I see with this plan with the nurse or doctor.    ..........................................................................................................................................  Patient/Patient Representative Signature      ..........................................................................................................................................  Patient Representative Print Name and Relationship to Patient    ..................................................               ................................................  Date                                   Time    ..........................................................................................................................................  Reviewed by Signature/Title    ...................................................              ..............................................  Date                                               Time          22EPIC Rev 08/18

## 2019-12-30 VITALS
HEART RATE: 74 BPM | BODY MASS INDEX: 33.09 KG/M2 | OXYGEN SATURATION: 94 % | SYSTOLIC BLOOD PRESSURE: 116 MMHG | DIASTOLIC BLOOD PRESSURE: 70 MMHG | TEMPERATURE: 98.2 F | WEIGHT: 212.1 LBS | RESPIRATION RATE: 18 BRPM

## 2019-12-30 RX ORDER — CETIRIZINE HYDROCHLORIDE 10 MG/1
10 TABLET ORAL 2 TIMES DAILY PRN
Qty: 20 TABLET | Refills: 0 | Status: SHIPPED | OUTPATIENT
Start: 2019-12-30 | End: 2020-10-29

## 2019-12-30 RX ORDER — MELOXICAM 15 MG/1
15 TABLET ORAL DAILY
Qty: 10 TABLET | Refills: 0 | Status: SHIPPED | OUTPATIENT
Start: 2019-12-30 | End: 2021-05-10

## 2019-12-30 ASSESSMENT — ENCOUNTER SYMPTOMS
FEVER: 0
FLANK PAIN: 1
HEADACHES: 0
ADENOPATHY: 0
BRUISES/BLEEDS EASILY: 0
SHORTNESS OF BREATH: 0
PSYCHIATRIC NEGATIVE: 1
GASTROINTESTINAL NEGATIVE: 1
HEMATURIA: 0
CHILLS: 0
WOUND: 0
BACK PAIN: 1
CARDIOVASCULAR NEGATIVE: 1
DYSURIA: 0
EYES NEGATIVE: 1
FREQUENCY: 0
POLYPHAGIA: 0
DIZZINESS: 1
POLYDIPSIA: 0
COUGH: 0

## 2019-12-30 NOTE — ED TRIAGE NOTES
Pt presents with multiple concerns tonight.  Pt states that he has had hip pain bilaterally for the last few months.  Pt complaining of right flank pain that has been for the last few weeks.  Pt states over the last few days he has been dizzy and some nausea.  Pt states primary reason for visit is the flank pain.  Tylenol arthritis at noon.

## 2019-12-30 NOTE — ED PROVIDER NOTES
History     Chief Complaint   Patient presents with     Flank Pain     HPI  Theo Pozo is a 39 year old male who presents to the ER with multiple concerns.  He states that he has had back pain issues to his low back for quite some time and he just cannot get any answers on why he continues to have back pain symptoms.  He states that he has had MRI scans and no one can find out any reason for his symptoms.  He states that he has had some flank pain bilaterally but more prominent on the right recently for several months.  He states the pain gets worse when he turns side to side and no one seems to be able to tell him what is causing his pain.  He states that he was seen for a full physical examination with his clinic provider a month ago and he did not really get any answers other than being placed on a medicine for his high cholesterol.  He is also concerned about recent cold-like symptoms but states that those seem to be improving however the last couple days he is developed some dizziness which she describes as a spinning sensation that occurs with change in position.  He has no spinning sensation at the moment but states it seems to be worse when he first wakes up in the morning.  I apologized to the patient as I told him that the emergency room is not really set up for evaluation and treatment of chronic medical conditions such as his chronic back pain issue but we would be glad to evaluate him for his more emergent concerns of the flank pain and his recent cold symptoms and  dizziness symptoms.    Allergies:  Allergies   Allergen Reactions     Penicillins      Sulfa Drugs        Problem List:    Patient Active Problem List    Diagnosis Date Noted     Hyperlipidemia LDL goal <130 11/07/2019     Priority: Medium     Malaise and fatigue 11/07/2019     Priority: Medium     Snoring 11/07/2019     Priority: Medium     Rib pain 09/19/2018     Priority: Medium     Tobacco abuse disorder 10/31/2017     Priority:  Medium     Moderate persistent asthma without complication 08/05/2016     Priority: Medium     Epidermal inclusion cyst 02/14/2012     Priority: Medium        Past Medical History:    Past Medical History:   Diagnosis Date     Heartburn      Moderate persistent asthma      Tobacco abuse disorder 10/31/2017       Past Surgical History:    Past Surgical History:   Procedure Laterality Date     ARTHROSCOPY KNEE RT/LT      Right knee for meniscal tear     ENDOSCOPY  2005    negatve findings     SURGICAL HISTORY OF -       nasal surgery for epistaxis and obstruction       Family History:    Family History   Problem Relation Age of Onset     Gastrointestinal Disease Mother         Crohn's disease       Social History:  Marital Status:  Single [1]  Social History     Tobacco Use     Smoking status: Current Every Day Smoker     Packs/day: 1.00     Years: 14.00     Pack years: 14.00     Types: Cigarettes     Smokeless tobacco: Former User     Tobacco comment: Advised to quit    Substance Use Topics     Alcohol use: Yes     Alcohol/week: 0.0 standard drinks     Comment: social infrequently     Drug use: No        Medications:    cetirizine (ZYRTEC) 10 MG tablet  meloxicam (MOBIC) 15 MG tablet  albuterol (PROAIR HFA) 108 (90 Base) MCG/ACT inhaler  atorvastatin (LIPITOR) 20 MG tablet  budesonide-formoterol (SYMBICORT) 160-4.5 MCG/ACT Inhaler  terbinafine (LAMISIL) 250 MG tablet      Review of Systems   Constitutional: Negative for chills and fever.   HENT: Positive for congestion and tinnitus (Patient states that he has had chronic tinnitus that seems to be gradually getting worse.  He is never had it evaluated.  He wonders if he should be worried about it.). Negative for ear discharge and ear pain.    Eyes: Negative.    Respiratory: Negative for cough and shortness of breath.    Cardiovascular: Negative.    Gastrointestinal: Negative.    Endocrine: Negative for polydipsia, polyphagia and polyuria.   Genitourinary: Positive for  flank pain. Negative for dysuria, frequency, genital sores, hematuria, penile swelling, scrotal swelling, testicular pain and urgency.   Musculoskeletal: Positive for back pain.   Skin: Negative for rash and wound.   Neurological: Positive for dizziness (Patient describes a spinning sensation that occurs with motion of his head especially noted in the morning when he first awakens for the last several days.  He has no symptoms currently.). Negative for headaches.   Hematological: Negative for adenopathy. Does not bruise/bleed easily.   Psychiatric/Behavioral: Negative.    All other systems reviewed and are negative.      Physical Exam   BP: 139/86  Pulse: 74  Heart Rate: 90  Temp: 98.2  F (36.8  C)  Resp: 18  Weight: 96.2 kg (212 lb 1.6 oz)  SpO2: 92 %      Physical Exam  Vitals signs and nursing note reviewed.   Constitutional:       General: He is not in acute distress.     Appearance: He is obese. He is not ill-appearing, toxic-appearing or diaphoretic.   HENT:      Head: Normocephalic and atraumatic.      Right Ear: Tympanic membrane, ear canal and external ear normal. There is no impacted cerumen.      Left Ear: Tympanic membrane, ear canal and external ear normal. There is no impacted cerumen.      Nose: Congestion present.      Mouth/Throat:      Mouth: Mucous membranes are moist.      Pharynx: Oropharyngeal exudate and posterior oropharyngeal erythema present.      Comments: Patient with some posterior pharyngeal exudate or possibly even thrush-like appearing lesions noted on exam.  Patient had extreme difficulty in tolerating a rapid strep screen swab due to aggressive gag reflex.  Eyes:      Extraocular Movements: Extraocular movements intact.      Conjunctiva/sclera: Conjunctivae normal.      Pupils: Pupils are equal, round, and reactive to light.   Neck:      Musculoskeletal: Normal range of motion and neck supple. No muscular tenderness.   Cardiovascular:      Rate and Rhythm: Normal rate.      Pulses:  Normal pulses.      Heart sounds: No murmur.   Pulmonary:      Effort: Pulmonary effort is normal. No respiratory distress.      Breath sounds: Normal breath sounds. No wheezing.   Abdominal:      General: Abdomen is flat. There is no distension.      Palpations: There is no mass.      Tenderness: There is no abdominal tenderness. There is no right CVA tenderness, left CVA tenderness, guarding or rebound.   Musculoskeletal:         General: Tenderness (Patient with tenderness to the bilateral quadratus lumborum musculature with palpation and range of motion testing.) present.   Skin:     General: Skin is warm.      Capillary Refill: Capillary refill takes less than 2 seconds.      Findings: No rash (No vesicular rash noted to the area of flank pain.).   Neurological:      Mental Status: He is alert and oriented to person, place, and time.   Psychiatric:         Mood and Affect: Mood is anxious.         Speech: Speech normal.         Behavior: Behavior normal.         Thought Content: Thought content normal.         ED Course        Procedures               Critical Care time:  none               Results for orders placed or performed during the hospital encounter of 12/29/19 (from the past 24 hour(s))   Routine UA with microscopic   Result Value Ref Range    Color Urine Yellow     Appearance Urine Cloudy     Glucose Urine Negative NEG^Negative mg/dL    Bilirubin Urine Negative NEG^Negative    Ketones Urine Negative NEG^Negative mg/dL    Specific Gravity Urine 1.023 1.003 - 1.035    Blood Urine Negative NEG^Negative    pH Urine 7.0 5.0 - 7.0 pH    Protein Albumin Urine 30 (A) NEG^Negative mg/dL    Urobilinogen mg/dL 4.0 (H) 0.0 - 2.0 mg/dL    Nitrite Urine Negative NEG^Negative    Leukocyte Esterase Urine Negative NEG^Negative    Source Midstream Urine     WBC Urine 0 0 - 5 /HPF    RBC Urine 0 0 - 2 /HPF    Bacteria Urine Few (A) NEG^Negative /HPF    Amorphous Crystals Few (A) NEG^Negative /HPF   Rapid strep screen    Result Value Ref Range    Specimen Description Throat     Rapid Strep A Screen       NEGATIVE: No Group A streptococcal antigen detected by immunoassay, await culture report.           Assessments & Plan (with Medical Decision Making)  39-year-old to the ER secondary to multiple concerns as noted above.  No specific emergent condition identified on exam today.  Patient with history suggestive of likely viral illness possibly triggering a inner ear dysfunction with resultant vertiginous symptoms.  Patient does have a long history for tinnitus and this could also be associated with his vertiginous symptoms.  I did discuss the recommendation of him seeing ENT for evaluation.  Patient was some tenderness to the quadratus lumborum musculature but no evidence of suggest acute renal colic or infectious etiology as a reason for his flank pain symptoms.  Urinalysis unremarkable for abnormality.  Patient with some posterior pharyngeal exudative findings with a negative strep screen.  I suspect this is some mild thrush to the posterior pharynx likely secondary to his chronic asthma medication inhaler use.  Encouraged him to rinse his mouth thoroughly after using his inhalers and to consider some cultured yogurt or cottage cheese as a way to prevent the thrush-like growth in the posterior pharyngeal area.  I did prescribe a course of Mobic to use for his muscle skeletal symptom pain as well as a course of Zyrtec to use for his vertiginous symptoms.  Encouraged him to follow-up with his clinic physician in regards to his more chronic concerns today.     I have reviewed the nursing notes.    I have reviewed the findings, diagnosis, plan and need for follow up with the patient.       Discharge Medication List as of 12/30/2019 12:08 AM      START taking these medications    Details   cetirizine (ZYRTEC) 10 MG tablet Take 1 tablet (10 mg) by mouth 2 times daily as needed (dizziness), Disp-20 tablet, R-0, E-Prescribe      meloxicam  (MOBIC) 15 MG tablet Take 1 tablet (15 mg) by mouth daily for 10 days TAKE WITH FOOD AS NEEDED FOR PAIN. WEAN OFF OF THE MEDICATIONS AS YOUR SYMPTOMS IMPROVE., Disp-10 tablet, R-0, E-Prescribe                  I verbally discussed the findings of the evaluation today in the ER. I have verbally discussed with Theo the suggested treatment(s) as described in the discharge instructions and handouts. I have prescribed the above listed medications and instructed him on appropriate use of these medications.      I have verbally suggested he follow-up in his clinic or return to the ER for increased symptoms. See the follow-up recommendations documented  in the after visit summary in this visit's EPIC chart.      Final diagnoses:   Flank pain - Bilateral, chronic   Chronic bilateral low back pain, unspecified whether sciatica present   Tinnitus, bilateral   Dizziness   Viral syndrome - recent nasal congestion, cold symptoms       12/29/2019   [facility] EMERGENCY DEPARTMENT     Geovanny Arias,   12/30/19 0542

## 2020-01-01 LAB
BACTERIA SPEC CULT: NORMAL
SPECIMEN SOURCE: NORMAL

## 2020-03-21 ENCOUNTER — VIRTUAL VISIT (OUTPATIENT)
Dept: FAMILY MEDICINE | Facility: OTHER | Age: 40
End: 2020-03-21

## 2020-03-21 NOTE — PROGRESS NOTES
"Date: 2020 00:29:05  Clinician: Joana Mccauley  Clinician NPI: [id number]  Patient: Theo Pozo  Patient : 1980  Patient Address: [address] [place] [id number]  Patient Phone: [phone]  Visit Protocol: URI  Patient Summary:  Theo is a 40 year old ( : 1980 ) male who initiated a Visit for COVID-19 (Coronavirus) evaluation and screening. When asked the question \"Please sign me up to receive news, health information and promotions from [employer].\", Theo responded \"Yes\".    Theo states his symptoms started today.   His symptoms consist of enlarged lymph nodes, wheezing, chills, and a headache.   Symptom details     Wheezing: Theo has been diagnosed with asthma. The wheezing does not interfere with his normal daily activities.    Headache: He states the headache is mild (1-3 on a 10 point pain scale).      Theo denies having ear pain, rhinitis, facial pain or pressure, myalgias, sore throat, cough, nasal congestion, malaise, teeth pain, and fever. He also denies taking antibiotic medication for the symptoms and having recent facial or sinus surgery in the past 60 days.   Precipitating events  He has not recently been exposed to someone with influenza. Theo has not been in close contact with any high risk individuals.   Pertinent COVID-19 (Coronavirus) information  Theo has not traveled internationally or to the areas where COVID-19 (Coronavirus) is widespread, including cruise ship travel in the last 14 days before the start of his symptoms.   Theo has not had a close contact with a laboratory-confirmed COVID-19 patient within 14 days of symptom onset. He also has not had a close contact with a suspected COVID-19 patient within 14 days of symptom onset.   Theo is not a healthcare worker and does not work in a healthcare facility.   Triage Point(s) temporarily suspended for COVID-19 (Coronavirus) screening  Theo reported the following symptoms which were previously " protocol referral points. These protocol referral points have temporarily been removed for purposes of COVID-19 (Coronavirus) screening.   Difficulty breathing even when resting and can only speak in phrase(s)   Pertinent medical history  Theo does not need a return to work/school note.   Weight: 210 lbs   Theo smokes or uses smokeless tobacco.   Weight: 210 lbs    MEDICATIONS: Symbicort inhalation, ALLERGIES: NKDA  Clinician Response:  Dear Theo,  I am sorry you are not feeling well. To determine the most appropriate care for you, I would like you to be seen in person to further discuss your health history and symptoms.  You will not be charged for this Visit. Thank you for trusting us with your care.  COVID-19 (Coronavirus) General Information  With the increase in the number of COVID-19 (Coronavirus) cases, we understand you may have some questions. Below is some helpful information on COVID-19 (Coronavirus).  How can I protect myself and others from the COVID-19 (Coronavirus)?  Because there is currently no vaccine to prevent infection, the best way to protect yourself is to avoid being exposed to this virus. Put distance between yourself and other people if COVID-19 (Coronavirus) is spreading in your community. The virus is thought to spread mainly from person-to-person.     Between people who are in close contact with one another (within about 6 about) for a prolonged period (10 minutes or longer).    Through respiratory droplets produced when an infected person coughs or sneezes.     The CDC recommends the following additional steps to protect yourself and others:     Wash your hands often with soap and water for at least 20 seconds, especially after blowing your nose, coughing, or sneezing; going to the bathroom; and before eating or preparing food.  Use an alcohol-based hand  that contains at least 60 percent alcohol if soap and water are not available.        Avoid touching your eyes, nose  and mouth with unwashed hands.    Avoid close contact with people who are sick.    Stay home when you are sick.    Cover your cough or sneeze with a tissue, then throw the tissue in the trash.    Clean and disinfect frequently touched objects and surfaces.     You can help stop COVID-19 (Coronavirus) by knowing the signs and symptoms:     Fever    Cough    Shortness of breath     Contact your healthcare provider if   Develop symptoms   AND   Have been in close contact with a person known to have COVID-19 (Coronavirus) or live in or have recently traveled from an area with ongoing spread of COVID-19 (Coronavirus). Call ahead before you go to a doctor's office or emergency room. Tell them about your recent travel and your symptoms.   For the most up to date information, visit the CDC's website.  Self-monitoring  Self-monitoring means people should monitor themselves for fever by taking their temperatures twice a day and remain alert for a cough or difficulty breathing.  It is important to check your health two times each day for 14 days after a potential exposure to a person with COVID-19 (Coronavirus) or after travel from a location where COVID-19 (Coronavirus) is widespread. If you have been exposed to a person with COVID-19 (Coronavirus), it may take up to 14 days to know if you will get sick. Follow the steps below to check and record your health.     Take your temperature with a thermometer twice a day, once in the morning and once in the evening, and watch for a cough or difficulty breathing for 14 days.    Write down your temperature and any COVID-19 symptoms you may have: feeling feverish, coughing, or difficulty breathing.    Stay home from work or school.    Do not take public transportation, taxis, or ride-shares.    Avoid crowded places (such as shopping centers and movie theaters) and limit your activities in public.    Keep your distance from others (about 6 feet or 2 meters).    If you get sick with  fever, cough, or trouble breathing, contact your healthcare provider and tell them about your recent travel and/or your symptoms.    If you need to seek medical care for other reasons, such as dialysis, call ahead to your doctor and tell them about your recent travel.     Steps to help prevent the spread of COVID-19 (Coronavirus) if you are sick  If you are sick with COVID-19 (Coronavirus) or suspect you are infected with the virus that causes COVID-19 (Coronavirus), follow the steps below to help prevent the disease from spreading&nbsp;to people in your home and community.     Stay home except to get medical care. Home isolation may be started in consultation with your healthcare clinician.    Separate yourself from other people and animals in your home.    Call ahead before visiting your doctor if you have a medical appointment.    Wear a facemask when you are around other people.    Cover your cough and sneezes.    Clean your hands often.    Avoid sharing personal household items.    Clean and disinfect frequently touched objects and surfaces everyday.    You will need to have someone drop off medications or household supplies (if needed) at your house without coming inside or in contact with you or others living in your house.    Monitor your symptoms and seek prompt medical care if your illness is worsening (e.g. Difficulty breathing).    Discontinue home isolation only in consultation with your healthcare provider.     For more detailed and up to date information on what to do if you are sick, visit this link: What to Do If You Are Sick With Coronavirus Disease 2019 (COVID-19).  Do I need to be tested for COVID-19 (Coronavirus)?     At this time, the limited number of available tests are controlled by the state and local health departments and are being reserved for more seriously ill patients, those with known exposure to confirmed patients, and those with recent travel (within 14 days) to countries with high  "rates of COVID-19 (Coronavirus).    Decisions on which patients receive testing will be based on the local spread of COVID-19 (Coronavirus) as well as the symptoms. Your healthcare provider will make the final decision on whether you should be tested.    In the meantime, if you have concerns that you may have been exposed, it is reasonable to practice \"social distancing.\"&nbsp; If you are ill with a cold or flu-like illness, please monitor your symptoms and reach out to your healthcare provider if your symptoms worsen.    For more up to date information, visit this link: COVID-19 (Coronavirus) Frequently Asked Questions and Answers.      Diagnosis: Refer for additional evaluation  Diagnosis ICD: R69  "

## 2020-03-22 NOTE — PROGRESS NOTES
"Date: 2020 22:01:00  Clinician: Sangita Ortega  Clinician NPI: [id number]  Patient: Theo Pozo  Patient : 1980  Patient Address: [address] [address] [place] [address]  Patient Phone: [phone]  Visit Protocol: URI  Patient Summary:  Theo is a 40 year old ( : 1980 ) male who initiated a Visit for COVID-19 (Coronavirus) evaluation and screening. When asked the question \"Please sign me up to receive news, health information and promotions from [employer].\", Theo responded \"Yes\".    Theo states his symptoms started 1-2 days ago.   His symptoms consist of a cough and a headache. He is experiencing mild difficulty breathing with activities but can speak normally in full sentences.   Symptom details     Cough: Theo coughs a few times an hour and his cough is more bothersome at night. Phlegm comes into his throat when he coughs. He does not believe his cough is caused by post-nasal drip. The color of the phlegm is clear.     Headache: He states the headache is mild (1-3 on a 10 point pain scale).      Theo denies having ear pain, rhinitis, facial pain or pressure, myalgias, wheezing, sore throat, nasal congestion, malaise, chills, teeth pain, and fever. He also denies taking antibiotic medication for the symptoms and having recent facial or sinus surgery in the past 60 days.   Precipitating events  He has recently been exposed to someone with influenza. Theo has not been in close contact with any high risk individuals.   Pertinent COVID-19 (Coronavirus) information  Theo has not traveled internationally or to the areas where COVID-19 (Coronavirus) is widespread, including cruise ship travel in the last 14 days before the start of his symptoms.   Theo has not had a close contact with a laboratory-confirmed COVID-19 patient within 14 days of symptom onset. He has had a close contact with a suspected COVID-19 patient within 14 days of symptom onset. Additional information about contact " with COVID-19 (Coronavirus) patient as reported by the patient (free text): Work with him 4 days a week.  Worked with him this week tuesday and friday   Theo is not a healthcare worker and does not work in a healthcare facility.   Pertinent medical history  Theo needs a return to work/school note.   Weight: 210 lbs   Theo smokes or uses smokeless tobacco.   Additional information as reported by the patient (free text): I have chronic asthma since I was a child   Weight: 210 lbs  A synchronous phone visit was initiated by the provider for the following reason: Double check asthmatic concerns.    MEDICATIONS: Symbicort inhalation, ALLERGIES: NKDA  Clinician Response:  Dear Theo,   Based on the information you have provided, you do have symptoms that are consistent with Coronavirus (COVID-19).   The coronavirus causes mild to severe respiratory illness with the most common symptoms including fever, cough and difficulty breathing. Unfortunately, many viruses cause similar symptoms and it can be difficult to distinguish between viruses, especially in mild cases, so we are presuming that anyone with cough or fever has coronavirus at this time.  Coronavirus/COVID-19 has reached the point of community spread in [place], meaning that we are finding the virus in people with no known exposure risk for jacque the virus. Given the increasing commonness of coronavirus in the community we are no longer testing patients who are not critically ill.  If you are a health care worker, you should refer to your employee health office for instructions about testing and returning to work.  For everyone else who has cough or fever, you should assume you are infected with coronavirus. Since you will not be tested but have symptoms that may be consistent with coronavirus, the CDC recommends you stay in self-isolation until these three things have happened:    You have had no fever for at least 72 hours (that is three full days  of no fever without the use of medicine that reduces fevers)    AND   Other symptoms have improved (for example, when your cough or shortness of breath have improved)   AND   At least 7 days have passed since your symptoms first appeared.   How to Isolate:    Isolate yourself at home.   Do Not allow any visitors  Do Not go to work or school  Do Not go to [facility],  centers, shopping, or other public places.  Do Not shake hands.  Avoid close contact with others (hugging, kissing).   Protect Others:    Cover Your Mouth and Nose with a mask, disposable tissue or wash cloth to avoid spreading germs to others.  Wash your hands and face frequently with soap and water.   Managing Symptoms:    At this time, we primarily recommend Tylenol (Acetaminophen) for fever or pain. If you have liver or kidney problems, contact your primary care provider for instructions on use of tylenol. Adults can take 650 mg (two 325 mg pills) by mouth every 4-6 hours as needed OR 1,000 mg (two 500 mg pills) every 8 hours as needed. MAXIMUM DAILY DOSE: 3,000mg. For children, refer to dosing on bottle based on age or weight.   If you develop significant shortness of breath that prevents you from doing normal activities, please call 911 or proceed to the nearest emergency room and alert them immediately that you have been in self-isolation for possible coronavirus.   For more information about COVID19 and options for caring for yourself at home, please visit the CDC website at https://www.cdc.gov/coronavirus/2019-ncov/about/steps-when-sick.htmlFor more options for care at [facility], please visit our website at [email or web address]     Diagnosis: Cough  Diagnosis ICD: R05  Triage Notes: I reviewed the patient's history, verified their identity, and explained the Visit process.    Having some very mild increased difficulty with his breathing. Patient reports that he has some anxiety with everything going on  and because he has chronic asthma he feels like this might be the cause.  Wanted to get some reassurances about his current asthma status. Has had an exacerbation in the past- nothing recent and he can tell the difference and he reports that this does not feel like that same feeling.  Synchronous Triage: phone, status: completed, duration: 390 seconds

## 2020-03-27 DIAGNOSIS — J45.40 MODERATE PERSISTENT ASTHMA WITHOUT COMPLICATION: ICD-10-CM

## 2020-03-27 RX ORDER — ALBUTEROL SULFATE 90 UG/1
2 AEROSOL, METERED RESPIRATORY (INHALATION) EVERY 6 HOURS PRN
Qty: 1 INHALER | Refills: 3 | Status: SHIPPED | OUTPATIENT
Start: 2020-03-27 | End: 2020-10-29

## 2020-03-27 NOTE — TELEPHONE ENCOUNTER
Pending Prescriptions:                       Disp   Refills    albuterol (PROAIR HFA) 108 (90 Base) MCG/*1 Inha*3            Sig: Inhale 2 puffs into the lungs every 6 hours as           needed    Prescription approved per [facility] Refill Protocol.    Shayy Sewell, MSN, RN

## 2020-09-14 DIAGNOSIS — J45.40 MODERATE PERSISTENT ASTHMA WITHOUT COMPLICATION: ICD-10-CM

## 2020-09-15 RX ORDER — BUDESONIDE AND FORMOTEROL FUMARATE DIHYDRATE 160; 4.5 UG/1; UG/1
AEROSOL RESPIRATORY (INHALATION)
Qty: 10.2 G | Refills: 0 | Status: SHIPPED | OUTPATIENT
Start: 2020-09-15 | End: 2020-10-16

## 2020-10-14 DIAGNOSIS — J45.40 MODERATE PERSISTENT ASTHMA WITHOUT COMPLICATION: ICD-10-CM

## 2020-10-15 NOTE — TELEPHONE ENCOUNTER
Pending Prescriptions:                       Disp   Refills    SYMBICORT 160-4.5 MCG/ACT Inhaler [Pharmac*       0        Sig: INHALE 2 PUFFS INTO THE LUNGS TWO TIMES A DAY      Routing refill request to provider for review/approval because:  Mariella given x1 and patient did not follow up, please advise    Iesha Gregory RN

## 2020-10-16 ENCOUNTER — NURSE TRIAGE (OUTPATIENT)
Dept: NURSING | Facility: CLINIC | Age: 40
End: 2020-10-16

## 2020-10-16 DIAGNOSIS — J45.40 MODERATE PERSISTENT ASTHMA WITHOUT COMPLICATION: ICD-10-CM

## 2020-10-16 RX ORDER — BUDESONIDE AND FORMOTEROL FUMARATE DIHYDRATE 160; 4.5 UG/1; UG/1
AEROSOL RESPIRATORY (INHALATION)
Qty: 6 G | Refills: 0 | Status: SHIPPED | OUTPATIENT
Start: 2020-10-16 | End: 2020-10-29

## 2020-10-16 NOTE — TELEPHONE ENCOUNTER
Pt calling,  Waiting for refill of Symbicort, If I dont have it by Monday I will be completely out . informed pt that refill is in process & sent to PCP for approval.  Reminded pt of the policy of 72 hrs needed to process request. Pt made request for refill 10/14/2020.  Pt indicated understanding.  Routing to PCP .  Michelle Dumont RN  [facility]          Additional Information    Negative: Drug overdose and triager unable to answer question    Negative: Caller requesting information unrelated to medicine    Negative: Caller requesting a prescription for Strep throat and has a positive culture result    Negative: Rash while taking a medication or within 3 days of stopping it    Negative: Immunization reaction suspected    Negative: Asthma and having symptoms of asthma (cough, wheezing, etc.)    Negative: Breastfeeding questions about mother's medicines and diet    Negative: MORE THAN A DOUBLE DOSE of a prescription or over-the-counter (OTC) drug    Negative: DOUBLE DOSE (an extra dose or lesser amount) of over-the-counter (OTC) drug and any symptoms (e.g., dizziness, nausea, pain, sleepiness)    Negative: DOUBLE DOSE (an extra dose or lesser amount) of prescription drug and any symptoms (e.g., dizziness, nausea, pain, sleepiness)    Negative: Took another person's prescription drug    Negative: DOUBLE DOSE (an extra dose or lesser amount) of prescription drug and NO symptoms (Exception: a double dose of antibiotics)    Negative: Diabetes drug error or overdose (e.g., took wrong type of insulin or took extra dose)    Negative: Caller has medication question about med not prescribed by PCP and triager unable to answer question (e.g., compatibility with other med, storage)    Request for URGENT new prescription or refill of 'essential' medication (i.e., likelihood of harm to patient if not taken) and triager unable to fill per department policy    Protocols used: MEDICATION QUESTION CALL-A-OH

## 2020-10-16 NOTE — TELEPHONE ENCOUNTER
refill encounter was sent to covering provider to address. Will close this encounter  Iesha Gregory RN

## 2020-10-16 NOTE — TELEPHONE ENCOUNTER
Refilled once.  Due for physical and med check with Jay Cervantes in 1 month.     DIAMOND SolorioC

## 2020-10-16 NOTE — TELEPHONE ENCOUNTER
"Routing refill request to provider for review/approval because:  PHQ9 score not current  T'd up 10.2g for provider review.      Requested Prescriptions   Pending Prescriptions Disp Refills     budesonide-formoterol (SYMBICORT) 160-4.5 MCG/ACT Inhaler 10.2 g 0       Inhaled Steroids Protocol Failed - 10/16/2020  1:07 PM   Last Written Prescription Date:  9/15/2020  Last Fill Quantity: 10.2g,  # refills: 0   Last office visit: 11/7/2019 with prescribing provider:     Future Office Visit:         Failed - Asthma control assessment score within normal limits in last 6 months     Please review ACT score.           Failed - Recent (6 mo) or future (30 days) visit within the authorizing provider's specialty     Patient had office visit in the last 6 months or has a visit in the next 30 days with authorizing provider or within the authorizing provider's specialty.  See \"Patient Info\" tab in inbasket, or \"Choose Columns\" in Meds & Orders section of the refill encounter.            Passed - Patient is age 12 or older        Passed - Medication is active on med list       Long-Acting Beta Agonist Inhalers Protocol  Failed - 10/16/2020  1:07 PM        Failed - Asthma control assessment score within normal limits in last 6 months     Please review ACT score.   ACT Total Scores 9/19/2018 8/28/2019 11/7/2019   ACT TOTAL SCORE - - -   ASTHMA ER VISITS - - -   ASTHMA HOSPITALIZATIONS - - -   ACT TOTAL SCORE (Goal Greater than or Equal to 20) 22 23 23   In the past 12 months, how many times did you visit the emergency room for your asthma without being admitted to the hospital? 0 0 0   In the past 12 months, how many times were you hospitalized overnight because of your asthma? 0 0 0             Failed - Order for Serevent, Striverdi, or Foradil and pt has steroid inhaler        Failed - Recent (6 mo) or future (30 days) visit within the authorizing provider's specialty     Patient had office visit in the last 6 months or has a visit " "in the next 30 days with authorizing provider or within the authorizing provider's specialty.  See \"Patient Info\" tab in inbasket, or \"Choose Columns\" in Meds & Orders section of the refill encounter.            Passed - Patient is age 12 or older        Passed - Medication is active on med list         Page Fine RN      "

## 2020-10-19 RX ORDER — BUDESONIDE AND FORMOTEROL FUMARATE DIHYDRATE 160; 4.5 UG/1; UG/1
AEROSOL RESPIRATORY (INHALATION)
Qty: 1 INHALER | Refills: 0 | Status: SHIPPED | OUTPATIENT
Start: 2020-10-19 | End: 2020-10-29 | Stop reason: ALTCHOICE

## 2020-10-28 NOTE — PROGRESS NOTES
SUBJECTIVE:   CC: Theo Pozo is an 40 year old male who presents for preventative health visit.     {Split Bill scripting  The purpose of this visit is to discuss your medical history and prevent health problems before you are sick. You may be responsible for a co-pay, coinsurance, or deductible if your visit today includes services such as checking on a sore throat, having an x-ray or lab test, or treating and evaluating a new or existing condition :306073}  Patient has been advised of split billing requirements and indicates understanding: {Yes and No:700900}  HPI  {Add if <65 person on Medicare  - Required Questions (Optional):785651}  {Outside tests to abstract? :900441}    {additional problems to add (Optional):317034}    Today's PHQ-2 Score:   PHQ-2 ( 1999 Pfizer) 11/7/2019   Q1: Little interest or pleasure in doing things 1   Q2: Feeling down, depressed or hopeless 0   PHQ-2 Score 1   Q1: Little interest or pleasure in doing things Several days   Q2: Feeling down, depressed or hopeless Not at all   PHQ-2 Score 1       Abuse: Current or Past(Physical, Sexual or Emotional)- { :933723}  Do you feel safe in your environment? { :896275}        Social History     Tobacco Use     Smoking status: Current Every Day Smoker     Packs/day: 1.00     Years: 14.00     Pack years: 14.00     Types: Cigarettes     Smokeless tobacco: Former User     Tobacco comment: Advised to quit    Substance Use Topics     Alcohol use: Yes     Alcohol/week: 0.0 standard drinks     Comment: social infrequently     {Rooming Staff- Complete this question if Prescreen response is not shown below for today's visit. If you drink alcohol do you typically have >3 drinks per day or >7 drinks per week? (Optional):172901}    Alcohol Use 11/7/2019   Prescreen: >3 drinks/day or >7 drinks/week? No   Prescreen: >3 drinks/day or >7 drinks/week? -   {add AUDIT responses (Optional) (A score of 7 for adult men is an indication of hazardous drinking; a  "score of 8 or more is an indication of an alcohol use disorder.  A score of 7 or more for adult women is an indication of hazardous drinking or an alchohol use disorder):118458}    Last PSA: No results found for: PSA    Reviewed orders with patient. Reviewed health maintenance and updated orders accordingly - { :137982::\"Yes\"}  {Chronicprobdata (optional):351335}    Reviewed and updated as needed this visit by clinical staff                 Reviewed and updated as needed this visit by Provider                {HISTORY OPTIONS (Optional):108399}    Review of Systems  {MALE ROS (Optional):022138::\"CONSTITUTIONAL: NEGATIVE for fever, chills, change in weight\",\"INTEGUMENTARY/SKIN: NEGATIVE for worrisome rashes, moles or lesions\",\"EYES: NEGATIVE for vision changes or irritation\",\"ENT: NEGATIVE for ear, mouth and throat problems\",\"RESP: NEGATIVE for significant cough or SOB\",\"CV: NEGATIVE for chest pain, palpitations or peripheral edema\",\"GI: NEGATIVE for nausea, abdominal pain, heartburn, or change in bowel habits\",\" male: negative for dysuria, hematuria, decreased urinary stream, erectile dysfunction, urethral discharge\",\"MUSCULOSKELETAL: NEGATIVE for significant arthralgias or myalgia\",\"NEURO: NEGATIVE for weakness, dizziness or paresthesias\",\"PSYCHIATRIC: NEGATIVE for changes in mood or affect\"}    OBJECTIVE:   There were no vitals taken for this visit.    Physical Exam  {Exam Choices (Optional):547224}    {Diagnostic Test Results (Optional):128414::\"Diagnostic Test Results:\",\"Labs reviewed in Epic\"}    ASSESSMENT/PLAN:   {Diag Picklist:528850}    Patient has been advised of split billing requirements and indicates understanding: {YES / NO:445754::\"Yes\"}  COUNSELING:   {MALE COUNSELING MESSAGES:457275::\"Reviewed preventive health counseling, as reflected in patient instructions\"}    Estimated body mass index is 33.09 kg/m  as calculated from the following:    Height as of 11/7/19: 1.705 m (5' 7.13\").    Weight as of " 12/29/19: 96.2 kg (212 lb 1.6 oz).     {Weight Management Plan (ACO) Complete if BMI is abnormal-  Ages 18-64  BMI >24.9.  Age 65+ with BMI <23 or >30 (Optional):225599}    He reports that he has been smoking cigarettes. He has a 14.00 pack-year smoking history. He has quit using smokeless tobacco.  Tobacco Cessation Action Plan:   {TOBACCO CESSATION ACTION PLAN:793389}      Counseling Resources:  ATP IV Guidelines  Pooled Cohorts Equation Calculator  FRAX Risk Assessment  ICSI Preventive Guidelines  Dietary Guidelines for Americans, 2010  USDA's MyPlate  ASA Prophylaxis  Lung CA Screening    Jay Bernstein PA-C  [facility]

## 2020-10-29 ENCOUNTER — OFFICE VISIT (OUTPATIENT)
Dept: FAMILY MEDICINE | Facility: OTHER | Age: 40
End: 2020-10-29
Payer: COMMERCIAL

## 2020-10-29 VITALS
BODY MASS INDEX: 33.12 KG/M2 | RESPIRATION RATE: 16 BRPM | WEIGHT: 211 LBS | HEART RATE: 86 BPM | SYSTOLIC BLOOD PRESSURE: 124 MMHG | OXYGEN SATURATION: 96 % | TEMPERATURE: 97.8 F | DIASTOLIC BLOOD PRESSURE: 76 MMHG | HEIGHT: 67 IN

## 2020-10-29 DIAGNOSIS — J45.40 MODERATE PERSISTENT ASTHMA WITHOUT COMPLICATION: Primary | ICD-10-CM

## 2020-10-29 DIAGNOSIS — E78.5 HYPERLIPIDEMIA LDL GOAL <130: ICD-10-CM

## 2020-10-29 PROCEDURE — 99213 OFFICE O/P EST LOW 20 MIN: CPT | Performed by: PHYSICIAN ASSISTANT

## 2020-10-29 RX ORDER — BUDESONIDE AND FORMOTEROL FUMARATE DIHYDRATE 160; 4.5 UG/1; UG/1
AEROSOL RESPIRATORY (INHALATION)
Qty: 6 G | Refills: 0 | Status: SHIPPED | OUTPATIENT
Start: 2020-10-29 | End: 2020-10-29

## 2020-10-29 RX ORDER — ALBUTEROL SULFATE 90 UG/1
2 AEROSOL, METERED RESPIRATORY (INHALATION) EVERY 6 HOURS PRN
Qty: 1 INHALER | Refills: 3 | Status: SHIPPED | OUTPATIENT
Start: 2020-10-29 | End: 2022-12-01

## 2020-10-29 RX ORDER — ATORVASTATIN CALCIUM 20 MG/1
20 TABLET, FILM COATED ORAL DAILY
Qty: 90 TABLET | Refills: 0 | Status: SHIPPED | OUTPATIENT
Start: 2020-10-29 | End: 2021-07-14

## 2020-10-29 RX ORDER — BUDESONIDE AND FORMOTEROL FUMARATE DIHYDRATE 160; 4.5 UG/1; UG/1
AEROSOL RESPIRATORY (INHALATION)
Qty: 6 G | Refills: 11 | Status: SHIPPED | OUTPATIENT
Start: 2020-10-29 | End: 2021-05-10

## 2020-10-29 ASSESSMENT — ENCOUNTER SYMPTOMS
DIZZINESS: 0
FEVER: 0
EYE PAIN: 0
SHORTNESS OF BREATH: 0
ABDOMINAL PAIN: 0
COUGH: 0
HEMATURIA: 0
SORE THROAT: 0
MYALGIAS: 0
CHILLS: 0
JOINT SWELLING: 0
PARESTHESIAS: 0
ARTHRALGIAS: 0
NERVOUS/ANXIOUS: 0
HEADACHES: 0
PALPITATIONS: 0
HEMATOCHEZIA: 0
HEARTBURN: 0
NAUSEA: 0
FREQUENCY: 0
WEAKNESS: 0
DYSURIA: 0
CONSTIPATION: 0
DIARRHEA: 0

## 2020-10-29 ASSESSMENT — MIFFLIN-ST. JEOR: SCORE: 1819.68

## 2020-10-29 ASSESSMENT — PAIN SCALES - GENERAL: PAINLEVEL: NO PAIN (0)

## 2020-10-29 NOTE — LETTER
My Asthma Action Plan    Name: Theo Pozo   YOB: 1980  Date: 10/29/2020   My doctor: Jay Bernstein PA-C   My clinic: [facility]        My Control Medicine: Budesonide + formoterol (Symbicort HFA) -  160/4.5 mcg as directed  My Rescue Medicine: Albuterol (Proair/Ventolin/Proventil HFA) 2-4 puffs EVERY 4 HOURS as needed. Use a spacer if recommended by your provider.   My Asthma Severity:   Moderate Persistent  Know your asthma triggers: upper respiratory infections, humidity and cold air  humidity  cold            GREEN ZONE   Good Control    I feel good    No cough or wheeze    Can work, sleep and play without asthma symptoms       Take your asthma control medicine every day.     1. If exercise triggers your asthma, take your rescue medication    15 minutes before exercise or sports, and    During exercise if you have asthma symptoms  2. Spacer to use with inhaler: If you have a spacer, make sure to use it with your inhaler             YELLOW ZONE Getting Worse  I have ANY of these:    I do not feel good    Cough or wheeze    Chest feels tight    Wake up at night   1. Keep taking your Green Zone medications  2. Start taking your rescue medicine:    every 20 minutes for up to 1 hour. Then every 4 hours for 24-48 hours.  3. If you stay in the Yellow Zone for more than 12-24 hours, contact your doctor.  4. If you do not return to the Green Zone in 12-24 hours or you get worse, start taking your oral steroid medicine if prescribed by your provider.           RED ZONE Medical Alert - Get Help  I have ANY of these:    I feel awful    Medicine is not helping    Breathing getting harder    Trouble walking or talking    Nose opens wide to breathe       1. Take your rescue medicine NOW  2. If your provider has prescribed an oral steroid medicine, start taking it NOW  3. Call your doctor NOW  4. If you are still in the Red Zone after 20 minutes and you have not reached your  doctor:    Take your rescue medicine again and    Call 911 or go to the emergency room right away    See your regular doctor within 2 weeks of an Emergency Room or Urgent Care visit for follow-up treatment.          Annual Reminders:  Meet with Asthma Educator,  Flu Shot in the Fall, consider Pneumonia Vaccination for patients with asthma (aged 19 and older).    Pharmacy:    [id number] - [place], [place] - [address] [facility] - [place], [place] - [address] [facility] - [place], [place] - [address] [facility] [place] - [address]    Electronically signed by Jay Bernstein PA-C   Date: 10/29/20                      Asthma Triggers  How To Control Things That Make Your Asthma Worse    Triggers are things that make your asthma worse.  Look at the list below to help you find your triggers and what you can do about them.  You can help prevent asthma flare-ups by staying away from your triggers.      Trigger                                                          What you can do   Cigarette Smoke  Tobacco smoke can make asthma worse. Do not allow smoking in your home, car or around you.  Be sure no one smokes at a child s day care or school.  If you smoke, ask your health care provider for ways to help you quit.  Ask family members to quit too.  Ask your health care provider for a referral to Quit Plan to help you quit smoking, or call 9-625-778-PLAN.     Colds, Flu, Bronchitis  These are common triggers of asthma. Wash your hands often.  Don t touch your eyes, nose or mouth.  Get a flu shot every year.     Dust Mites  These are tiny bugs that live in cloth or carpet. They are too small to see. Wash sheets and blankets in hot water every week.   Encase pillows and mattress in dust mite proof covers.  Avoid having carpet if you can. If you have carpet, vacuum weekly.   Use a dust mask and HEPA vacuum.   Pollen and Outdoor  Mold  Some people are allergic to trees, grass, or weed pollen, or molds. Try to keep your windows closed.  Limit time out doors when pollen count is high.   Ask you health care provider about taking medicine during allergy season.     Animal Dander  Some people are allergic to skin flakes, urine or saliva from pets with fur or feathers. Keep pets with fur or feathers out of your home.    If you can t keep the pet outdoors, then keep the pet out of your bedroom.  Keep the bedroom door closed.  Keep pets off cloth furniture and away from stuffed toys.     Mice, Rats, and Cockroaches   Some people are allergic to the waste from these pests.   Cover food and garbage.  Clean up spills and food crumbs.  Store grease in the refrigerator.   Keep food out of the bedroom.   Indoor Mold  This can be a trigger if your home has high moisture. Fix leaking faucets, pipes, or other sources of water.   Clean moldy surfaces.  Dehumidify basement if it is damp and smelly.   Smoke, Strong Odors, and Sprays  These can reduce air quality. Stay away from strong odors and sprays, such as perfume, powder, hair spray, paints, smoke incense, paint, cleaning products, candles and new carpet.   Exercise or Sports  Some people with asthma have this trigger. Be active!  Ask your doctor about taking medicine before sports or exercise to prevent symptoms.    Warm up for 5-10 minutes before and after sports or exercise.     Other Triggers of Asthma  Cold air:  Cover your nose and mouth with a scarf.  Sometimes laughing or crying can be a trigger.  Some medicines and food can trigger asthma.

## 2020-10-29 NOTE — PROGRESS NOTES
"Subjective     Theo Pozo is a 40 year old male who presents to clinic today for the following health issues:    HPI         Hyperlipidemia Follow-Up      Are you regularly taking any medication or supplement to lower your cholesterol?   Yes- atorvastatin    Are you having muscle aches or other side effects that you think could be caused by your cholesterol lowering medication?  No    Asthma Follow-Up    Was ACT completed today?    Yes    ACT Total Scores 11/7/2019   ACT TOTAL SCORE -   ASTHMA ER VISITS -   ASTHMA HOSPITALIZATIONS -   ACT TOTAL SCORE (Goal Greater than or Equal to 20) 23   In the past 12 months, how many times did you visit the emergency room for your asthma without being admitted to the hospital? 0   In the past 12 months, how many times were you hospitalized overnight because of your asthma? 0          How many days per week do you miss taking your asthma controller medication?  0    Please describe any recent triggers for your asthma: humidity and cold    Have you had any Emergency Room Visits, Urgent Care Visits, or Hospital Admissions since your last office visit?  No      How many servings of fruits and vegetables do you eat daily?  0-1    On average, how many sweetened beverages do you drink each day (Examples: soda, juice, sweet tea, etc.  Do NOT count diet or artificially sweetened beverages)?   3    How many days per week do you exercise enough to make your heart beat faster? 3 or less    How many minutes a day do you exercise enough to make your heart beat faster? 9 or less    How many days per week do you miss taking your medication? 0    Review of Systems   Constitutional, HEENT, cardiovascular, pulmonary, GI, , musculoskeletal, neuro, skin, endocrine and psych systems are negative, except as otherwise noted.      Objective    /76   Pulse 86   Temp 97.8  F (36.6  C) (Temporal)   Resp 16   Ht 1.708 m (5' 7.25\")   Wt 95.7 kg (211 lb)   SpO2 96%   BMI 32.80 kg/m    Body " "mass index is 32.8 kg/m .  Physical Exam   GENERAL: healthy, alert and no distress  NECK: no adenopathy, no asymmetry, masses, or scars and thyroid normal to palpation  RESP: lungs clear to auscultation - no rales, rhonchi or wheezes  CV: regular rate and rhythm, normal S1 S2, no S3 or S4, no murmur, click or rub, no peripheral edema and peripheral pulses strong  ABDOMEN: soft, nontender, no hepatosplenomegaly, no masses and bowel sounds normal  MS: no gross musculoskeletal defects noted, no edema    No results found for this or any previous visit (from the past 24 hour(s)).        Assessment & Plan     Theo was seen today for asthma.    Diagnoses and all orders for this visit:    Moderate persistent asthma without complication  -     albuterol (PROAIR HFA) 108 (90 Base) MCG/ACT inhaler; Inhale 2 puffs into the lungs every 6 hours as needed for wheezing  -     budesonide-formoterol (SYMBICORT) 160-4.5 MCG/ACT Inhaler; INHALE TWO PUFFS INTO THE LUNGS TWO TIMES A DAY    Hyperlipidemia LDL goal <130  -     atorvastatin (LIPITOR) 20 MG tablet; Take 1 tablet (20 mg) by mouth daily Due for labs for refills.      His asthma is in very good control at this point time no new concerns refilled medications.    We reviewed his labs with him from his last visit: Noted very high LDL cholesterol.  He has not been taking his Lipitor.  Strongly recommended that he begin to do so and follow-up with a full physical sometime in the next couple of weeks.  BMI:   Estimated body mass index is 32.8 kg/m  as calculated from the following:    Height as of this encounter: 1.708 m (5' 7.25\").    Weight as of this encounter: 95.7 kg (211 lb).                No follow-ups on file.    Jay Bernstein PA-C  [facility]"

## 2021-05-03 ENCOUNTER — TELEPHONE (OUTPATIENT)
Dept: FAMILY MEDICINE | Facility: OTHER | Age: 41
End: 2021-05-03

## 2021-05-03 NOTE — TELEPHONE ENCOUNTER
Patient Quality Outreach Summary      Summary:    Patient is due/failing the following:   ACT needed and Asthma follow-up visit  Physical   Type of outreach:    Phone, left message for patient/parent to call back.    Questions for provider review:    None                                                                                                                    La Donovan CMA       Chart routed to Care Team.     Consent (Marginal Mandibular)/Introductory Paragraph: The rationale for Mohs was explained to the patient and consent was obtained. The risks, benefits and alternatives to therapy were discussed in detail. Specifically, the risks of damage to the marginal mandibular branch of the facial nerve, infection, scarring, bleeding, prolonged wound healing, incomplete removal, allergy to anesthesia, and recurrence were addressed. Prior to the procedure, the treatment site was clearly identified and confirmed by the patient. All components of Universal Protocol/PAUSE Rule completed.

## 2021-05-07 NOTE — PROGRESS NOTES
"    Assessment & Plan     Screening for HIV (human immunodeficiency virus)  Need for hepatitis C screening test  Deferred to future physical    Moderate persistent asthma without complication  Patient would like to consider starting Advair again.  He went off this medication sometime ago due to cost and would like to try it again.  Prescription sent to the pharmacy.  He is advised that there is a generic equivalent that we might be able to get for less.  Advised to have pharmacy contact us depending on cost and dependability.  - budesonide-formoterol (SYMBICORT) 160-4.5 MCG/ACT Inhaler; INHALE TWO PUFFS INTO THE LUNGS TWO TIMES A DAY  - fluticasone-salmeterol (ADVAIR) 500-50 MCG/DOSE inhaler; Inhale 1 puff into the lungs every 12 hours    Hyperlipidemia LDL goal <130  Reviewed his overall status today.  Advised to related labs.  He states he wants to come back as a full physical in the near future and will get labs done at that point time.  Advised that he come back fasting by at least 8 hours.     BMI:   Estimated body mass index is 31.99 kg/m  as calculated from the following:    Height as of 10/29/20: 1.708 m (5' 7.25\").    Weight as of this encounter: 93.3 kg (205 lb 12 oz).   Weight management plan: Discussed healthy diet and exercise guidelines    Work on weight loss  Regular exercise  No follow-ups on file.    Jay Bernstein PA-C  [facility]    Flor Venegas is a 41 year old who presents for the following health issues     HPI     Asthma Follow-Up    Was ACT completed today?    Yes    ACT Total Scores 5/12/2021   ACT TOTAL SCORE -   ASTHMA ER VISITS -   ASTHMA HOSPITALIZATIONS -   ACT TOTAL SCORE (Goal Greater than or Equal to 20) 24   In the past 12 months, how many times did you visit the emergency room for your asthma without being admitted to the hospital? 0   In the past 12 months, how many times were you hospitalized overnight because of your asthma? 0          How many days " per week do you miss taking your asthma controller medication?  0    Please describe any recent triggers for your asthma: None    Have you had any Emergency Room Visits, Urgent Care Visits, or Hospital Admissions since your last office visit?  No      How many servings of fruits and vegetables do you eat daily?  0-1    On average, how many sweetened beverages do you drink each day (Examples: soda, juice, sweet tea, etc.  Do NOT count diet or artificially sweetened beverages)?   1    How many days per week do you exercise enough to make your heart beat faster? 3 or less    How many minutes a day do you exercise enough to make your heart beat faster? 30 - 60    How many days per week do you miss taking your medication? 0      Review of Systems   Constitutional, HEENT, cardiovascular, pulmonary, GI, , musculoskeletal, neuro, skin, endocrine and psych systems are negative, except as otherwise noted.      Objective    /64   Pulse 77   Temp 98  F (36.7  C) (Temporal)   Resp 18   Wt 93.3 kg (205 lb 12 oz)   SpO2 97%   BMI 31.99 kg/m    Body mass index is 31.99 kg/m .  Physical Exam   GENERAL: healthy, alert and no distress  NECK: no adenopathy, no asymmetry, masses, or scars and thyroid normal to palpation  RESP: lungs clear to auscultation - no rales, rhonchi or wheezes  CV: regular rate and rhythm, normal S1 S2, no S3 or S4, no murmur, click or rub, no peripheral edema and peripheral pulses strong  ABDOMEN: soft, nontender, no hepatosplenomegaly, no masses and bowel sounds normal  MS: no gross musculoskeletal defects noted, no edema  NEURO: Normal strength and tone, mentation intact and speech normal  PSYCH: mentation appears normal, affect normal/bright

## 2021-05-10 ENCOUNTER — OFFICE VISIT (OUTPATIENT)
Dept: FAMILY MEDICINE | Facility: OTHER | Age: 41
End: 2021-05-10
Payer: COMMERCIAL

## 2021-05-10 VITALS
TEMPERATURE: 98 F | RESPIRATION RATE: 18 BRPM | HEART RATE: 77 BPM | WEIGHT: 205.75 LBS | BODY MASS INDEX: 31.99 KG/M2 | OXYGEN SATURATION: 97 % | DIASTOLIC BLOOD PRESSURE: 64 MMHG | SYSTOLIC BLOOD PRESSURE: 130 MMHG

## 2021-05-10 DIAGNOSIS — E78.5 HYPERLIPIDEMIA LDL GOAL <130: ICD-10-CM

## 2021-05-10 DIAGNOSIS — Z11.4 SCREENING FOR HIV (HUMAN IMMUNODEFICIENCY VIRUS): ICD-10-CM

## 2021-05-10 DIAGNOSIS — Z11.59 NEED FOR HEPATITIS C SCREENING TEST: ICD-10-CM

## 2021-05-10 DIAGNOSIS — J45.40 MODERATE PERSISTENT ASTHMA WITHOUT COMPLICATION: Primary | ICD-10-CM

## 2021-05-10 PROCEDURE — 99213 OFFICE O/P EST LOW 20 MIN: CPT | Performed by: PHYSICIAN ASSISTANT

## 2021-05-10 RX ORDER — BUDESONIDE AND FORMOTEROL FUMARATE DIHYDRATE 160; 4.5 UG/1; UG/1
AEROSOL RESPIRATORY (INHALATION)
Qty: 6 G | Refills: 11 | Status: SHIPPED | OUTPATIENT
Start: 2021-05-10 | End: 2021-08-18

## 2021-05-10 NOTE — LETTER
My Asthma Action Plan    Name: Theo Pozo   YOB: 1980  Date: 5/10/2021   My doctor: Jay Bernstein PA-C   My clinic: [facility]        My Control Medicine: Budesonide + formoterol (Symbicort HFA) -  160/4.5 mcg as directed  My Rescue Medicine: Albuterol (Proair/Ventolin/Proventil HFA) 2-4 puffs EVERY 4 HOURS as needed. Use a spacer if recommended by your provider.   My Asthma Severity:   Moderate Persistent  Know your asthma triggers: upper respiratory infections  None            GREEN ZONE   Good Control    I feel good    No cough or wheeze    Can work, sleep and play without asthma symptoms       Take your asthma control medicine every day.     1. If exercise triggers your asthma, take your rescue medication    15 minutes before exercise or sports, and    During exercise if you have asthma symptoms  2. Spacer to use with inhaler: If you have a spacer, make sure to use it with your inhaler             YELLOW ZONE Getting Worse  I have ANY of these:    I do not feel good    Cough or wheeze    Chest feels tight    Wake up at night   1. Keep taking your Green Zone medications  2. Start taking your rescue medicine:    every 20 minutes for up to 1 hour. Then every 4 hours for 24-48 hours.  3. If you stay in the Yellow Zone for more than 12-24 hours, contact your doctor.  4. If you do not return to the Green Zone in 12-24 hours or you get worse, start taking your oral steroid medicine if prescribed by your provider.           RED ZONE Medical Alert - Get Help  I have ANY of these:    I feel awful    Medicine is not helping    Breathing getting harder    Trouble walking or talking    Nose opens wide to breathe       1. Take your rescue medicine NOW  2. If your provider has prescribed an oral steroid medicine, start taking it NOW  3. Call your doctor NOW  4. If you are still in the Red Zone after 20 minutes and you have not reached your doctor:    Take your rescue medicine again  and    Call 911 or go to the emergency room right away    See your regular doctor within 2 weeks of an Emergency Room or Urgent Care visit for follow-up treatment.          Annual Reminders:  Meet with Asthma Educator,  Flu Shot in the Fall, consider Pneumonia Vaccination for patients with asthma (aged 19 and older).    Pharmacy:    [id number] - [place], [place] - [address] [facility] - [place], [place] - [address] [facility] - [place], [place] - [address] [facility] - [place], [place] - [address]    Electronically signed by Jay Bernstein PA-C   Date: 05/10/21                      Asthma Triggers  How To Control Things That Make Your Asthma Worse    Triggers are things that make your asthma worse.  Look at the list below to help you find your triggers and what you can do about them.  You can help prevent asthma flare-ups by staying away from your triggers.      Trigger                                                          What you can do   Cigarette Smoke  Tobacco smoke can make asthma worse. Do not allow smoking in your home, car or around you.  Be sure no one smokes at a child s day care or school.  If you smoke, ask your health care provider for ways to help you quit.  Ask family members to quit too.  Ask your health care provider for a referral to Quit Plan to help you quit smoking, or call 7-090-733-PLAN.     Colds, Flu, Bronchitis  These are common triggers of asthma. Wash your hands often.  Don t touch your eyes, nose or mouth.  Get a flu shot every year.     Dust Mites  These are tiny bugs that live in cloth or carpet. They are too small to see. Wash sheets and blankets in hot water every week.   Encase pillows and mattress in dust mite proof covers.  Avoid having carpet if you can. If you have carpet, vacuum weekly.   Use a dust mask and HEPA vacuum.   Pollen and Outdoor Mold  Some people are allergic to trees,  grass, or weed pollen, or molds. Try to keep your windows closed.  Limit time out doors when pollen count is high.   Ask you health care provider about taking medicine during allergy season.     Animal Dander  Some people are allergic to skin flakes, urine or saliva from pets with fur or feathers. Keep pets with fur or feathers out of your home.    If you can t keep the pet outdoors, then keep the pet out of your bedroom.  Keep the bedroom door closed.  Keep pets off cloth furniture and away from stuffed toys.     Mice, Rats, and Cockroaches   Some people are allergic to the waste from these pests.   Cover food and garbage.  Clean up spills and food crumbs.  Store grease in the refrigerator.   Keep food out of the bedroom.   Indoor Mold  This can be a trigger if your home has high moisture. Fix leaking faucets, pipes, or other sources of water.   Clean moldy surfaces.  Dehumidify basement if it is damp and smelly.   Smoke, Strong Odors, and Sprays  These can reduce air quality. Stay away from strong odors and sprays, such as perfume, powder, hair spray, paints, smoke incense, paint, cleaning products, candles and new carpet.   Exercise or Sports  Some people with asthma have this trigger. Be active!  Ask your doctor about taking medicine before sports or exercise to prevent symptoms.    Warm up for 5-10 minutes before and after sports or exercise.     Other Triggers of Asthma  Cold air:  Cover your nose and mouth with a scarf.  Sometimes laughing or crying can be a trigger.  Some medicines and food can trigger asthma.

## 2021-05-13 ASSESSMENT — ASTHMA QUESTIONNAIRES: ACT_TOTALSCORE: 24

## 2021-05-14 NOTE — PROGRESS NOTES
SUBJECTIVE:   CC: Theo Pozo is an 41 year old male who presents for preventative health visit.   Patient has been advised of split billing requirements and indicates understanding: Yes  Healthy Habits:     Getting at least 3 servings of Calcium per day:  NO    Bi-annual eye exam:  NO    Dental care twice a year:  NO    Sleep apnea or symptoms of sleep apnea:  Daytime drowsiness and Excessive snoring    Diet:  Regular (no restrictions)    Frequency of exercise:  None    Taking medications regularly:  Yes    Medication side effects:  None    PHQ-2 Total Score: 0    Additional concerns today:  No     Today's PHQ-2 Score:   PHQ-2 ( 1999 Pfizer) 5/17/2021   Q1: Little interest or pleasure in doing things 0   Q2: Feeling down, depressed or hopeless 0   PHQ-2 Score 0   Q1: Little interest or pleasure in doing things Not at all   Q2: Feeling down, depressed or hopeless Not at all   PHQ-2 Score 0     Abuse: Current or Past(Physical, Sexual or Emotional)- No  Do you feel safe in your environment? Yes    Have you ever done Advance Care Planning? (For example, a Health Directive, POLST, or a discussion with a medical provider or your loved ones about your wishes): No, advance care planning information given to patient to review.  Patient plans to discuss their wishes with loved ones or provider.      Social History     Tobacco Use     Smoking status: Current Every Day Smoker     Packs/day: 1.00     Years: 14.00     Pack years: 14.00     Types: Cigarettes     Smokeless tobacco: Former User     Tobacco comment: Advised to quit    Substance Use Topics     Alcohol use: Yes     Alcohol/week: 0.0 standard drinks     Comment: social infrequently     If you drink alcohol do you typically have >3 drinks per day or >7 drinks per week? No    Alcohol Use 5/17/2021   Prescreen: >3 drinks/day or >7 drinks/week? No   Prescreen: >3 drinks/day or >7 drinks/week? -   No flowsheet data found.    Last PSA: No results found for:  PSA    Reviewed orders with patient. Reviewed health maintenance and updated orders accordingly - Yes  Lab work is in process  Labs reviewed in EPIC  BP Readings from Last 3 Encounters:   05/17/21 118/70   05/10/21 130/64   10/29/20 124/76    Wt Readings from Last 3 Encounters:   05/17/21 92.1 kg (203 lb)   05/10/21 93.3 kg (205 lb 12 oz)   10/29/20 95.7 kg (211 lb)                  Patient Active Problem List   Diagnosis     Epidermal inclusion cyst     Moderate persistent asthma without complication     Tobacco abuse disorder     Rib pain     Hyperlipidemia LDL goal <130     Malaise and fatigue     Snoring     Sleep disorder     Nicotine dependence, uncomplicated, unspecified nicotine product type     Past Surgical History:   Procedure Laterality Date     ARTHROSCOPY KNEE RT/LT      Right knee for meniscal tear     ENDOSCOPY  2005    negatve findings     SURGICAL HISTORY OF -       nasal surgery for epistaxis and obstruction       Social History     Tobacco Use     Smoking status: Current Every Day Smoker     Packs/day: 1.00     Years: 14.00     Pack years: 14.00     Types: Cigarettes     Smokeless tobacco: Former User     Tobacco comment: Advised to quit    Substance Use Topics     Alcohol use: Yes     Alcohol/week: 0.0 standard drinks     Comment: social infrequently     Family History   Problem Relation Age of Onset     Gastrointestinal Disease Mother         Crohn's disease         Current Outpatient Medications   Medication Sig Dispense Refill     atorvastatin (LIPITOR) 20 MG tablet Take 1 tablet (20 mg) by mouth daily Due for labs for refills. 90 tablet 0     albuterol (PROAIR HFA) 108 (90 Base) MCG/ACT inhaler Inhale 2 puffs into the lungs every 6 hours as needed for wheezing 1 Inhaler 3     budesonide-formoterol (SYMBICORT) 160-4.5 MCG/ACT Inhaler INHALE TWO PUFFS INTO THE LUNGS TWO TIMES A DAY 6 g 11     fluticasone-salmeterol (ADVAIR) 500-50 MCG/DOSE inhaler Inhale 1 puff into the lungs every 12 hours 3  "each 1     Allergies   Allergen Reactions     Penicillins      Sulfa Drugs      Recent Labs   Lab Test 11/20/19  0248 11/07/19  0855 08/28/19  0805 08/05/16  0842   LDL  --  172*  --  137*   HDL  --  46  --  39*   TRIG  --  111  --  149   ALT 43 42 45  --    CR 0.81 1.00  --  0.96   GFRESTIMATED >90 >90  --  87   GFRESTBLACK >90 >90  --  >90  African American GFR Calc     POTASSIUM 4.0 4.5  --  4.3   TSH  --  1.66  --   --         Reviewed and updated as needed this visit by clinical staff  Tobacco     Med Hx  Surg Hx  Fam Hx  Soc Hx        Reviewed and updated as needed this visit by Provider                Past Medical History:   Diagnosis Date     Heartburn      Moderate persistent asthma      Tobacco abuse disorder 10/31/2017      Past Surgical History:   Procedure Laterality Date     ARTHROSCOPY KNEE RT/LT      Right knee for meniscal tear     ENDOSCOPY  2005    negatve findings     SURGICAL HISTORY OF -       nasal surgery for epistaxis and obstruction       Review of Systems   Constitutional: Negative for chills and fever.   HENT: Negative for congestion, ear pain, hearing loss and sore throat.    Eyes: Negative for pain and visual disturbance.   Respiratory: Negative for cough and shortness of breath.    Cardiovascular: Negative for chest pain, palpitations and peripheral edema.   Gastrointestinal: Positive for heartburn. Negative for abdominal pain, constipation, diarrhea, hematochezia and nausea.   Genitourinary: Positive for frequency. Negative for discharge, dysuria, genital sores, hematuria, impotence and urgency.   Musculoskeletal: Negative for arthralgias, joint swelling and myalgias.   Skin: Negative for rash.   Neurological: Negative for dizziness, weakness, headaches and paresthesias.   Psychiatric/Behavioral: Negative for mood changes. The patient is not nervous/anxious.        OBJECTIVE:   /70   Pulse 75   Temp 97.9  F (36.6  C) (Temporal)   Ht 1.702 m (5' 7\")   Wt 92.1 kg (203 lb)  "  SpO2 96%   BMI 31.79 kg/m      Physical Exam  GENERAL: healthy, alert and no distress  EYES: Eyes grossly normal to inspection, PERRL and conjunctivae and sclerae normal  HENT: ear canals and TM's normal, nose and mouth without ulcers or lesions.  He does have some tonsillar hypertrophy and about 1+ with a narrow posterior oropharynx.  This does raise questions for obstructive sleep apnea and he does admit that he snores.    NECK: no adenopathy, no asymmetry, masses, or scars and thyroid normal to palpation  RESP: lungs clear to auscultation - no rales, rhonchi or wheezes  CV: regular rate and rhythm, normal S1 S2, no S3 or S4, no murmur, click or rub, no peripheral edema and peripheral pulses strong  ABDOMEN: soft, nontender, no hepatosplenomegaly, no masses and bowel sounds normal  MS: no gross musculoskeletal defects noted, no edema  SKIN: no suspicious lesions or rashes  NEURO: Normal strength and tone, mentation intact and speech normal  PSYCH: mentation appears normal, affect normal/bright    Diagnostic Test Results:  Labs reviewed in Epic    ASSESSMENT/PLAN:   1. Routine general medical examination at a health care facility  - Lipid panel reflex to direct LDL Fasting  - Comprehensive metabolic panel  - CBC with platelets    2. Hyperlipidemia LDL goal <130  - SLEEP EVALUATION & MANAGEMENT REFERRAL - [facility] [phone] (Age 13 and up if over 100 lbs); Future    3. Moderate persistent asthma without complication  - SLEEP EVALUATION & MANAGEMENT REFERRAL - [place] [phone] (Age 13 and up if over 100 lbs); Future    4. Sleep disorder  5. Snoring  By the end of the time of our discussion around his snoring he is fairly certain that he does have sleep apnea.  We discussed risk factors and the need for further evaluation and options.  - SLEEP EVALUATION & MANAGEMENT REFERRAL - [facility] [phone] (Age 13  "and up if over 100 lbs); Future    6. Nicotine dependence, uncomplicated, unspecified nicotine product type  - SLEEP EVALUATION & MANAGEMENT REFERRAL - ADULT -[facility] [place] [phone] (Age 13 and up if over 100 lbs); Future    7. Need for hepatitis C screening test  8. Screening for HIV (human immunodeficiency virus)  9. Need for vaccination  declined      Patient has been advised of split billing requirements and indicates understanding: Yes  COUNSELING:   Reviewed preventive health counseling, as reflected in patient instructions       Regular exercise       Healthy diet/nutrition       Vision screening       Hearing screening    Estimated body mass index is 31.79 kg/m  as calculated from the following:    Height as of this encounter: 1.702 m (5' 7\").    Weight as of this encounter: 92.1 kg (203 lb).     Weight management plan: Discussed healthy diet and exercise guidelines    He reports that he has been smoking cigarettes. He has a 14.00 pack-year smoking history. He has quit using smokeless tobacco.  Tobacco Cessation Action Plan:   Information offered: Patient not interested at this time      Counseling Resources:  ATP IV Guidelines  Pooled Cohorts Equation Calculator  FRAX Risk Assessment  ICSI Preventive Guidelines  Dietary Guidelines for Americans, 2010  USDA's MyPlate  ASA Prophylaxis  Lung CA Screening    Jay Bernstein PA-C  [facility]  "

## 2021-05-17 ENCOUNTER — OFFICE VISIT (OUTPATIENT)
Dept: FAMILY MEDICINE | Facility: OTHER | Age: 41
End: 2021-05-17
Payer: COMMERCIAL

## 2021-05-17 VITALS
TEMPERATURE: 97.9 F | HEIGHT: 67 IN | BODY MASS INDEX: 31.86 KG/M2 | SYSTOLIC BLOOD PRESSURE: 118 MMHG | HEART RATE: 75 BPM | OXYGEN SATURATION: 96 % | DIASTOLIC BLOOD PRESSURE: 70 MMHG | WEIGHT: 203 LBS

## 2021-05-17 DIAGNOSIS — Z00.00 ROUTINE GENERAL MEDICAL EXAMINATION AT A HEALTH CARE FACILITY: ICD-10-CM

## 2021-05-17 DIAGNOSIS — Z11.59 NEED FOR HEPATITIS C SCREENING TEST: ICD-10-CM

## 2021-05-17 DIAGNOSIS — E78.5 HYPERLIPIDEMIA LDL GOAL <130: ICD-10-CM

## 2021-05-17 DIAGNOSIS — J45.40 MODERATE PERSISTENT ASTHMA WITHOUT COMPLICATION: ICD-10-CM

## 2021-05-17 DIAGNOSIS — G47.9 SLEEP DISORDER: ICD-10-CM

## 2021-05-17 DIAGNOSIS — Z11.4 SCREENING FOR HIV (HUMAN IMMUNODEFICIENCY VIRUS): ICD-10-CM

## 2021-05-17 DIAGNOSIS — R06.83 SNORING: ICD-10-CM

## 2021-05-17 DIAGNOSIS — Z23 NEED FOR VACCINATION: Primary | ICD-10-CM

## 2021-05-17 DIAGNOSIS — F17.200 NICOTINE DEPENDENCE, UNCOMPLICATED, UNSPECIFIED NICOTINE PRODUCT TYPE: ICD-10-CM

## 2021-05-17 LAB
ALBUMIN SERPL-MCNC: 3.8 G/DL (ref 3.4–5)
ALP SERPL-CCNC: 103 U/L (ref 40–150)
ALT SERPL W P-5'-P-CCNC: 38 U/L (ref 0–70)
ANION GAP SERPL CALCULATED.3IONS-SCNC: 1 MMOL/L (ref 3–14)
AST SERPL W P-5'-P-CCNC: 17 U/L (ref 0–45)
BILIRUB SERPL-MCNC: 0.6 MG/DL (ref 0.2–1.3)
BUN SERPL-MCNC: 12 MG/DL (ref 7–30)
CALCIUM SERPL-MCNC: 8.6 MG/DL (ref 8.5–10.1)
CHLORIDE SERPL-SCNC: 110 MMOL/L (ref 94–109)
CHOLEST SERPL-MCNC: 148 MG/DL
CO2 SERPL-SCNC: 29 MMOL/L (ref 20–32)
CREAT SERPL-MCNC: 0.95 MG/DL (ref 0.66–1.25)
ERYTHROCYTE [DISTWIDTH] IN BLOOD BY AUTOMATED COUNT: 14.6 % (ref 10–15)
GFR SERPL CREATININE-BSD FRML MDRD: >90 ML/MIN/{1.73_M2}
GLUCOSE SERPL-MCNC: 91 MG/DL (ref 70–99)
HCT VFR BLD AUTO: 50.4 % (ref 40–53)
HDLC SERPL-MCNC: 44 MG/DL
HGB BLD-MCNC: 16.9 G/DL (ref 13.3–17.7)
LDLC SERPL CALC-MCNC: 75 MG/DL
MCH RBC QN AUTO: 30.3 PG (ref 26.5–33)
MCHC RBC AUTO-ENTMCNC: 33.5 G/DL (ref 31.5–36.5)
MCV RBC AUTO: 90 FL (ref 78–100)
NONHDLC SERPL-MCNC: 104 MG/DL
PLATELET # BLD AUTO: 316 10E9/L (ref 150–450)
POTASSIUM SERPL-SCNC: 4.3 MMOL/L (ref 3.4–5.3)
PROT SERPL-MCNC: 6.9 G/DL (ref 6.8–8.8)
RBC # BLD AUTO: 5.58 10E12/L (ref 4.4–5.9)
SODIUM SERPL-SCNC: 140 MMOL/L (ref 133–144)
TRIGL SERPL-MCNC: 146 MG/DL
WBC # BLD AUTO: 10.5 10E9/L (ref 4–11)

## 2021-05-17 PROCEDURE — 80061 LIPID PANEL: CPT | Performed by: PHYSICIAN ASSISTANT

## 2021-05-17 PROCEDURE — 99213 OFFICE O/P EST LOW 20 MIN: CPT | Mod: 25 | Performed by: PHYSICIAN ASSISTANT

## 2021-05-17 PROCEDURE — 85027 COMPLETE CBC AUTOMATED: CPT | Performed by: PHYSICIAN ASSISTANT

## 2021-05-17 PROCEDURE — 99396 PREV VISIT EST AGE 40-64: CPT | Performed by: PHYSICIAN ASSISTANT

## 2021-05-17 PROCEDURE — 36415 COLL VENOUS BLD VENIPUNCTURE: CPT | Performed by: PHYSICIAN ASSISTANT

## 2021-05-17 PROCEDURE — 80053 COMPREHEN METABOLIC PANEL: CPT | Performed by: PHYSICIAN ASSISTANT

## 2021-05-17 ASSESSMENT — ENCOUNTER SYMPTOMS
FREQUENCY: 1
ARTHRALGIAS: 0
ABDOMINAL PAIN: 0
NAUSEA: 0
CHILLS: 0
SORE THROAT: 0
DIZZINESS: 0
HEMATURIA: 0
JOINT SWELLING: 0
WEAKNESS: 0
HEADACHES: 0
CONSTIPATION: 0
HEARTBURN: 1
PALPITATIONS: 0
DYSURIA: 0
DIARRHEA: 0
FEVER: 0
NERVOUS/ANXIOUS: 0
PARESTHESIAS: 0
COUGH: 0
SHORTNESS OF BREATH: 0
HEMATOCHEZIA: 0
MYALGIAS: 0
EYE PAIN: 0

## 2021-05-17 ASSESSMENT — MIFFLIN-ST. JEOR: SCORE: 1774.43

## 2021-05-17 NOTE — PATIENT INSTRUCTIONS
The Benefits of Living Tobacco-Free  What do you want to improve in your life by quitting tobacco? Whether you smoke cigarettes, e-cigarettes, cigars, or a pipe, or use smokeless tobacco, there are many reasons to quit. Check off some reasons you want to be tobacco-free.  Health benefits  ___  I want to breathe without coughing or feeling short of breath.  ___  I want to reduce my risk for lung cancer, oral cancer, heart disease, bone problems such as osteoporosis and fractures, and chronic lung disease. Or reduce my risk for a serious lung injury called EVALI that may happen from vaping or using e-cigarettes.  ___  I want to have fewer wrinkles and softer skin.  ___  I want to improve my sense of taste and smell.  ___  For pregnant women: I want to reduce my risk for a miscarriage, stillbirth,  birth, or a low-birth-weight baby.  Personal benefits  ___  I want to be able to walk, go up stairs, and exercise without becoming out of breath.  ___  I want to feel more in control of my life.  ___  I want to have better-smelling hair, clothes, home, and car.  ___  I want to save time by not having to take smoke breaks, buy tobacco products, or hunt for a light.  ___  I want to have whiter teeth and fresher breath.  Family benefits  ___  I want to reduce my child's respiratory tract infections.  ___  I want to set a healthy example for my child.  ___  I want to have the energy needed to play with my children and not become out-of-breath  ___  I want to reduce my family s cancer risk.  Financial benefits  ___  I want to save hundreds of dollars each year that would be spent on tobacco products.  ___  I want to save money on medical bills.  ___  I want to save money on life, health, and car insurance premiums.  How much do you spend?  A tobacco habit is expensive. Do you know how much you spend on tobacco each year? Fill in the blanks below to find out:  A. How much do you pay for 1 pack of cigarettes, 1 cigar, 1  pouch of pipe tobacco, or 1 can of smokeless tobacco? $________  B. How many packs, cigars, pouches, or cans do you use each day? _______________  C. Multiply answer A with answer B:___________________  D. Multiply answer C with 365: $___________________. This is your yearly cost of using tobacco.  Besides the cost of buying tobacco, there are other costs. These include:    Costs of cleaning clothing, furniture, and car    Replacement costs for clothing and furniture    Medical costs for tobacco-related illnesses    Missed days of work because of illness    Higher health, life, and car insurance premiums  Get help    QuitNow, www.cdc.gov/tobacco/quit_smoking/, 800-QUIT-NOW (074-723-7718).    QuitLine, www.smokefree.gov, 877-44U-QUIT (347-369-6555).    [employer] last reviewed this educational content on 4/1/2020 2000-2021 [employer]. All rights reserved. This information is not intended as a substitute for professional medical care. Always follow your healthcare professional's instructions.

## 2021-05-17 NOTE — LETTER
May 17, 2021      Theo R Nohelia  [address] [address] [place] [id number]        Dear ,    We are writing to inform you of your test results.    Excellent labs at this point in time.  Would encourage you to continue with your cholesterol medications and recheck in 6 months.  Decreasing the salt in your diet may be helpful and increased healthy fluids is advised as well.    Resulted Orders   Lipid panel reflex to direct LDL Fasting   Result Value Ref Range    Cholesterol 148 <200 mg/dL    Triglycerides 146 <150 mg/dL    HDL Cholesterol 44 >39 mg/dL    LDL Cholesterol Calculated 75 <100 mg/dL      Comment:      Desirable:       <100 mg/dl    Non HDL Cholesterol 104 <130 mg/dL   Comprehensive metabolic panel   Result Value Ref Range    Sodium 140 133 - 144 mmol/L    Potassium 4.3 3.4 - 5.3 mmol/L    Chloride 110 (H) 94 - 109 mmol/L    Carbon Dioxide 29 20 - 32 mmol/L    Anion Gap 1 (L) 3 - 14 mmol/L    Glucose 91 70 - 99 mg/dL    Urea Nitrogen 12 7 - 30 mg/dL    Creatinine 0.95 0.66 - 1.25 mg/dL    GFR Estimate >90 >60 mL/min/[1.73_m2]      Comment:      Non  GFR Calc  Starting 12/18/2018, serum creatinine based estimated GFR (eGFR) will be   calculated using the Chronic Kidney Disease Epidemiology Collaboration   (CKD-EPI) equation.      GFR Estimate If Black >90 >60 mL/min/[1.73_m2]      Comment:       GFR Calc  Starting 12/18/2018, serum creatinine based estimated GFR (eGFR) will be   calculated using the Chronic Kidney Disease Epidemiology Collaboration   (CKD-EPI) equation.      Calcium 8.6 8.5 - 10.1 mg/dL    Bilirubin Total 0.6 0.2 - 1.3 mg/dL    Albumin 3.8 3.4 - 5.0 g/dL    Protein Total 6.9 6.8 - 8.8 g/dL    Alkaline Phosphatase 103 40 - 150 U/L    ALT 38 0 - 70 U/L    AST 17 0 - 45 U/L   CBC with platelets   Result Value Ref Range    WBC 10.5 4.0 - 11.0 10e9/L    RBC Count 5.58 4.4 - 5.9 10e12/L    Hemoglobin 16.9 13.3 - 17.7 g/dL    Hematocrit 50.4 40.0 - 53.0 %     MCV 90 78 - 100 fl    MCH 30.3 26.5 - 33.0 pg    MCHC 33.5 31.5 - 36.5 g/dL    RDW 14.6 10.0 - 15.0 %    Platelet Count 316 150 - 450 10e9/L       If you have any questions or concerns, please call the clinic at the number listed above.       Sincerely,      Jay Goodwin PA-C

## 2021-06-21 NOTE — PROGRESS NOTES
Does Theo have a CPAP/Bipap?  No     [place] Sleep Scale: 6    Theo is a 41 year old who is being evaluated via a billable telephone visit.      What phone number would you like to be contacted at?[phone]    How would you like to obtain your AVS? Mail a copy    Subjective   Theo is a 41 year old who presents for the following health issues snoring.   Vitals:  No vitals were obtained today due to virtual visit.    Phone call duration: 30 minutes      Sleep Consultation:    Date on this visit: 6/23/2021    Theo Pozo is sent by Jay Goodwin for a sleep consultation regarding snoring, daytime fatigue..    Primary Physician: aJy Goodwin     Theo Pozo reports nightly snoring and occasional gasping, choking, snorting and poor quality of sleep for many years.     Theo goes to sleep at 9:30 PM during the week. He wakes up at 6:00 AM without an alarm. He falls asleep in 30 minutes.  Theo denies difficulty falling asleep.  He wakes up 2-4 times a night for 30 minutes before falling back to sleep.  Theo wakes up to go to the bathroom and uncertain reasons.  On weekends, Theo keeps a similar schedule. Patient gets an average of 4-5 hours of sleep per night.     Patient does leave the TV on in bed all night.     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  He works day and evening shifts.      Theo does snore every night. Patient does not have a regular bed partner. There is report of snoring.  He does have witnessed apneas.  Patient sleeps on his side. He has occasional snort arousals and restless legs,. Theo has occasional sleep talking.    He denies sleep walking, sleep talking, enuresis and sleep terrors as a child.  Theo has reflux at night, heartburn and anxiety.      Theo has gained 0-5 pounds in the last year.  Patient describes themself as a morning person.  He would prefer to go to sleep at 10:00 PM and wake up at 6:30 AM.  Patient's [place] Sleepiness score 6/24 inconsistent with excessive   daytime sleepiness.      Theo naps 3-4 times per month for 30-60 minutes, feels unrefreshed after naps. He takes some inadvertant naps.  He denies closing eyes, dozing and falling asleep while driving.Patient was counseled on the importance of driving while alert, to pull over if drowsy, or nap before getting into the vehicle if sleepy.  He uses 0-1 cups/day of coffee, 1-2 sodas/day. Last caffeine intake is usually before 6-7 pm.    Allergies:    Allergies   Allergen Reactions     Penicillins      Sulfa Drugs        Medications:    Current Outpatient Medications   Medication Sig Dispense Refill     albuterol (PROAIR HFA) 108 (90 Base) MCG/ACT inhaler Inhale 2 puffs into the lungs every 6 hours as needed for wheezing 1 Inhaler 3     atorvastatin (LIPITOR) 20 MG tablet Take 1 tablet (20 mg) by mouth daily Due for labs for refills. 90 tablet 0     budesonide-formoterol (SYMBICORT) 160-4.5 MCG/ACT Inhaler INHALE TWO PUFFS INTO THE LUNGS TWO TIMES A DAY 6 g 11     fluticasone-salmeterol (ADVAIR) 500-50 MCG/DOSE inhaler Inhale 1 puff into the lungs every 12 hours 3 each 1       Problem List:  Patient Active Problem List    Diagnosis Date Noted     Sleep disorder 05/17/2021     Priority: Medium     Nicotine dependence, uncomplicated, unspecified nicotine product type 05/17/2021     Priority: Medium     Hyperlipidemia LDL goal <130 11/07/2019     Priority: Medium     Malaise and fatigue 11/07/2019     Priority: Medium     Snoring 11/07/2019     Priority: Medium     Rib pain 09/19/2018     Priority: Medium     Tobacco abuse disorder 10/31/2017     Priority: Medium     Moderate persistent asthma without complication 08/05/2016     Priority: Medium     Epidermal inclusion cyst 02/14/2012     Priority: Medium        Past Medical/Surgical History:  Past Medical History:   Diagnosis Date     Heartburn      Moderate persistent asthma      Tobacco abuse disorder 10/31/2017     Past Surgical History:   Procedure Laterality Date      ARTHROSCOPY KNEE RT/LT      Right knee for meniscal tear     ENDOSCOPY  2005    negatve findings     SURGICAL HISTORY OF -       nasal surgery for epistaxis and obstruction       Social History:  Social History     Socioeconomic History     Marital status: Single     Spouse name: Not on file     Number of children: Not on file     Years of education: Not on file     Highest education level: Not on file   Occupational History     Not on file   Social Needs     Financial resource strain: Not on file     Food insecurity     Worry: Not on file     Inability: Not on file     Transportation needs     Medical: Not on file     Non-medical: Not on file   Tobacco Use     Smoking status: Current Every Day Smoker     Packs/day: 1.00     Years: 14.00     Pack years: 14.00     Types: Cigarettes     Smokeless tobacco: Former User     Tobacco comment: Advised to quit    Substance and Sexual Activity     Alcohol use: Yes     Alcohol/week: 0.0 standard drinks     Comment: social infrequently     Drug use: No     Sexual activity: Yes     Partners: Female   Lifestyle     Physical activity     Days per week: Not on file     Minutes per session: Not on file     Stress: Not on file   Relationships     Social connections     Talks on phone: Not on file     Gets together: Not on file     Attends Voodoo service: Not on file     Active member of club or organization: Not on file     Attends meetings of clubs or organizations: Not on file     Relationship status: Not on file     Intimate partner violence     Fear of current or ex partner: Not on file     Emotionally abused: Not on file     Physically abused: Not on file     Forced sexual activity: Not on file   Other Topics Concern     Parent/sibling w/ CABG, MI or angioplasty before 65F 55M? No      Service No     Blood Transfusions No     Caffeine Concern No     Occupational Exposure No     Hobby Hazards No     Sleep Concern Yes     Comment: At times     Stress Concern No      Weight Concern Yes     Comment: Feels a little overweight     Special Diet No     Back Care No     Exercise No     Bike Helmet No     Seat Belt Yes     Self-Exams No   Social History Narrative     Not on file       Family History:  Family History   Problem Relation Age of Onset     Gastrointestinal Disease Mother         Crohn's disease           Physical Examination:  Vitals: There were no vitals taken for this visit.  BMI= There is no height or weight on file to calculate BMI.         No flowsheet data found.           Impression/Plan:  Snoring, daytime fatigue in a patient with asthma. Will request a home sleep study for possible sleep apnea.  Literature provided regarding sleep apnea.      He will follow up with me in approximately two weeks after his sleep study has been competed to review the results and discuss plan of care.       Polysomnography reviewed.  Obstructive sleep apnea reviewed.  Complications of untreated sleep apnea were reviewed.      CC: Jay Goodwin

## 2021-06-23 ENCOUNTER — VIRTUAL VISIT (OUTPATIENT)
Dept: SLEEP MEDICINE | Facility: CLINIC | Age: 41
End: 2021-06-23
Payer: COMMERCIAL

## 2021-06-23 DIAGNOSIS — R29.818 SUSPECTED SLEEP APNEA: Primary | ICD-10-CM

## 2021-06-23 DIAGNOSIS — E78.5 HYPERLIPIDEMIA LDL GOAL <130: ICD-10-CM

## 2021-06-23 DIAGNOSIS — R06.83 SNORING: ICD-10-CM

## 2021-06-23 DIAGNOSIS — J45.40 MODERATE PERSISTENT ASTHMA WITHOUT COMPLICATION: ICD-10-CM

## 2021-06-23 DIAGNOSIS — F17.200 NICOTINE DEPENDENCE, UNCOMPLICATED, UNSPECIFIED NICOTINE PRODUCT TYPE: ICD-10-CM

## 2021-06-23 DIAGNOSIS — G47.9 SLEEP DISORDER: ICD-10-CM

## 2021-06-23 PROCEDURE — 99203 OFFICE O/P NEW LOW 30 MIN: CPT | Mod: 95 | Performed by: PHYSICIAN ASSISTANT

## 2021-06-23 NOTE — PATIENT INSTRUCTIONS
"      MY TREATMENT INFORMATION FOR SLEEP APNEA-  Theo Pozo    DOCTOR : LARON Calderon-JUAN LUIS    Am I having a sleep study at a sleep center?  --->Due to insurance clearance delays, you will be contacted within 2-4 weeks to schedule    Am I having a home sleep study?  --->Watch the video for the device you are using:    -/drop off device-   https://www.Perfect Channel.com/watch?v=yGGFBdELGhk    -Disposable device sent out require phone/computer application-   https://www.Perfect Channel.com/watch?v=BCce_vbiwxE      Frequently asked questions:  1. What is Obstructive Sleep Apnea (TANG)? TANG is the most common type of sleep apnea. Apnea means, \"without breath.\"  Apnea is most often caused by narrowing or collapse of the upper airway as muscles relax during sleep.   Almost everyone has occasional apneas. Most people with sleep apnea have had brief interruptions at night frequently for many years.  The severity of sleep apnea is related to how frequent and severe the events are.   2. What are the consequences of TANG? Symptoms include: feeling sleepy during the day, snoring loudly, gasping or stopping of breathing, trouble sleeping, and occasionally morning headaches or heartburn at night.  Sleepiness can be serious and even increase the risk of falling asleep while driving. Other health consequences may include development of high blood pressure and other cardiovascular disease in persons who are susceptible. Untreated TANG  can contribute to heart disease, stroke and diabetes.   3. What are the treatment options? In most situations, sleep apnea is a lifelong disease that must be managed with daily therapy. Medications are not effective for sleep apnea and surgery is generally not considered until other therapies have been tried. Your treatment is your choice . Continuous Positive Airway (CPAP) works right away and is the therapy that is effective in nearly everyone. An oral device to hold your jaw forward is usually the next " most reliable option. Other options include postioning devices (to keep you off your back), weight loss, and surgery including a tongue pacing device. There is more detail about some of these options below.  4. Are my sleep studies covered by insurance? Although we will request verification of coverage, we advise you also check in advance of the study to ensure there is coverage.    Important tips for those choosing CPAP and similar devices   Know your equipment:  CPAP is continuous positive airway pressure that prevents obstructive sleep apnea by keeping the throat from collapsing while you are sleeping. In most cases, the device is  smart  and can slowly self-adjusts if your throat collapses and keeps a record every day of how well you are treated-this information is available to you and your care team.  BPAP is bilevel positive airway pressure that keeps your throat open and also assists each breath with a pressure boost to maintain adequate breathing.  Special kinds of BPAP are used in patients who have inadequate breathing from lung or heart disease. In most cases, the device is  smart  and can slowly self-adjusts to assist breathing. Like CPAP, the device keeps a record of how well you are treated.  Your mask is your connection to the device. You get to choose what feels most comfortable and the staff will help to make sure if fits. Here: are some examples of the different masks that are available:       Key points to remember on your journey with sleep apnea:  1. Sleep study.  PAP devices often need to be adjusted during a sleep study to show that they are effective and adjusted right.  2. Good tips to remember: Try wearing just the mask during a quiet time during the day so your body adapts to wearing it. A humidifier is recommended for comfort in most cases to prevent drying of your nose and throat. Allergy medication from your provider may help you if you are having nasal congestion.  3. Getting  settled-in. It takes more than one night for most of us to get used to wearing a mask. Try wearing just the mask during a quiet time during the day so your body adapts to wearing it. A humidifier is recommended for comfort in most cases. Our team will work with you carefully on the first day and will be in contact within 4 days and again at 2 and 4 weeks for advice and remote device adjustments. Your therapy is evaluated by the device each day.   4. Use it every night. The more you are able to sleep naturally for 7-8 hours, the more likely you will have good sleep and to prevent health risks or symptoms from sleep apnea. Even if you use it 4 hours it helps. Occasionally all of us are unable to use a medical therapy, in sleep apnea, it is not dangerous to miss one night.   5. Communicate. Call our skilled team on the number provided on the first day if your visit for problems that make it difficult to wear the device. Over 2 out of 3 patients can learn to wear the device long-term with help from our team. Remember to call our team or your sleep providers if you are unable to wear the device as we may have other solutions for those who cannot adapt to mask CPAP therapy. It is recommended that you sleep your sleep provider within the first 3 months and yearly after that if you are not having problems.   6. Use it for your health. We encourage use of CPAP masks during daytime quiet periods to allow your face and brain to adapt to the sensation of CPAP so that it will be a more natural sensation to awaken to at night or during naps. This can be very useful during the first few weeks or months of adapting to CPAP though it does not help medically to wear CPAP during wakefulness and  should not be used as a strategy just to meet guidelines.  7. Take care of your equipment. Make sure you clean your mask and tubing using directions every day and that your filter and mask are replaced as recommended or if they are not  working.     BESIDES CPAP, WHAT OTHER THERAPIES ARE THERE?    Positioning Device  Positioning devices are generally used when sleep apnea is mild and only occurs on your back.This example shows a pillow that straps around the waist. It may be appropriate for those whose sleep study shows milder sleep apnea that occurs primarily when lying flat on one's back. Preliminary studies have shown benefit but effectiveness at home may need to be verified by a home sleep test. These devices are generally not covered by medical insurance.  Examples of devices that maintain sleeping on the back to prevent snoring and mild sleep apnea.    Belt type body positioner  http://Slyce.Yunzhisheng/    Electronic reminder  http://nightshifttherapy.com/  [email or web address]/      Oral Appliance  What is oral appliance therapy?  An oral appliance device fits on your teeth at night like a retainer used after having braces. The device is made by a specialized dentist and requires several visits over 1-2 months before a manufactured device is made to fit your teeth and is adjusted to prevent your sleep apnea. Once an oral device is working properly, snoring should be improved. A home sleep test may be recommended at that time if to determine whether the sleep apnea is adequately treated.       Some things to remember:  -Oral devices are often, but not always, covered by your medical insurance. Be sure to check with your insurance provider.   -If you are referred for oral therapy, you will be given a list of specialized dentists to consider or you may choose to visit the Web site of the American Academy of Dental Sleep Medicine  -Oral devices are less likely to work if you have severe sleep apnea or are extremely overweight.     More detailed information  An oral appliance is a small acrylic device that fits over the upper and lower teeth  (similar to a retainer or a mouth guard). This device slightly moves jaw forward, which moves the base  of the tongue forward, opens the airway, improves breathing for effective treat snoring and obstructive sleep apnea in perhaps 7 out of 10 people .  The best working devices are custom-made by a dental device  after a mold is made of the teeth 1, 2, 3.  When is an oral appliance indicated?  Oral appliance therapy is recommended as a first-line treatment for patients with primary snoring, mild sleep apnea, and for patients with moderate sleep apnea who prefer appliance therapy to use of CPAP4, 5. Severity of sleep apnea is determined by sleep testing and is based on the number of respiratory events per hour of sleep.   How successful is oral appliance therapy?  The success rate of oral appliance therapy in patients with mild sleep apnea is 75-80% while in patients with moderate sleep apnea it is 50-70%. The chance of success in patients with severe sleep apnea is 40-50%. The research also shows that oral appliances have a beneficial effect on the cardiovascular health of TANG patients at the same magnitude as CPAP therapy7.  Oral appliances should be a second-line treatment in cases of severe sleep apnea, but if not completely successful then a combination therapy utilizing CPAP plus oral appliance therapy may be effective. Oral appliances tend to be effective in a broad range of patients although studies show that the patients who have the highest success are females, younger patients, those with milder disease, and less severe obesity. 3, 6.   Finding a dentist that practices dental sleep medicine  Specific training is available through the American Academy of Dental Sleep Medicine for dentists interested in working in the field of sleep. To find a dentist who is educated in the field of sleep and the use of oral appliances, near you, visit the Web site of the American Academy of Dental Sleep Medicine.    References  1. denilson Kenyon al. Objectively measured vs self-reported compliance during oral  appliance therapy for sleep-disordered breathing. Chest 2013; 144(5): 5107-1692.  2. Frank, et al. Objective measurement of compliance during oral appliance therapy for sleep-disordered breathing. Thorax 2013; 68(1): 91-96.  3. Darrell et al. Mandibular advancement devices in 620 men and women with TANG and snoring: tolerability and predictors of treatment success. Chest 2004; 125: 0733-1963.  4. Daniel, et al. Oral appliances for snoring and TANG: a review. Sleep 2006; 29: 244-262.  5. Sally et al. Oral appliance treatment for TANG: an update. J Clin Sleep Med 2014; 10(2): 215-227.  6. Mckenzie et al. Predictors of OSAH treatment outcome. J Dent Res 2007; 86: 2201-7805.      Weight Loss:    Weight loss is a long-term strategy that may improve sleep apnea in some patients.    Weight management is a personal decision and the decision should be based on your interest and the potential benefits.  If you are interested in exploring weight loss strategies, the following discussion covers the impact on weight loss on sleep apnea and the approaches that may be successful.    Being overweight does not necessarily mean you will have health consequences.  Those who have BMI over 35 or over 27 with existing medical conditions carries greater risk.   Weight loss decreases severity of sleep apnea in most people with obesity. For those with mild obesity who have developed snoring with weight gain, even 15-30 pound weight loss can improve and occasionally eliminate sleep apnea.  Structured and life-long dietary and health habits are necessary to lose weight and keep healthier weight levels.     Though there may be significant health benefits from weight loss, long-term weight loss is very difficult to achieve- studies show success with dietary management in less than 10% of people. In addition, substantial weight loss may require years of dietary control and may be difficult if patients have severe obesity. In these  cases, surgical management may be considered.  Finally, older individuals who have tolerated obesity without health complications may be less likely to benefit from weight loss strategies.        Your BMI is There is no height or weight on file to calculate BMI.  Weight management is a personal decision.  If you are interested in exploring weight loss strategies, the following discussion covers the approaches that may be successful. Body mass index (BMI) is one way to tell whether you are at a healthy weight, overweight, or obese. It measures your weight in relation to your height.  A BMI of 18.5 to 24.9 is in the healthy range. A person with a BMI of 25 to 29.9 is considered overweight, and someone with a BMI of 30 or greater is considered obese. More than two-thirds of American adults are considered overweight or obese.  Being overweight or obese increases the risk for further weight gain. Excess weight may lead to heart disease and diabetes.  Creating and following plans for healthy eating and physical activity may help you improve your health.  Weight control is part of healthy lifestyle and includes exercise, emotional health, and healthy eating habits. Careful eating habits lifelong are the mainstay of weight control. Though there are significant health benefits from weight loss, long-term weight loss with diet alone may be very difficult to achieve- studies show long-term success with dietary management in less than 10% of people. Attaining a healthy weight may be especially difficult to achieve in those with severe obesity. In some cases, medications, devices and surgical management might be considered.  What can you do?  If you are overweight or obese and are interested in methods for weight loss, you should discuss this with your provider.     Consider reducing daily calorie intake by 500 calories.     Keep a food journal.     Avoiding skipping meals, consider cutting portions instead.    Diet combined  with exercise helps maintain muscle while optimizing fat loss. Strength training is particularly important for building and maintaining muscle mass. Exercise helps reduce stress, increase energy, and improves fitness. Increasing exercise without diet control, however, may not burn enough calories to loose weight.       Start walking three days a week 10-20 minutes at a time    Work towards walking thirty minutes five days a week     Eventually, increase the speed of your walking for 1-2 minutes at time    In addition, we recommend that you review healthy lifestyles and methods for weight loss available through the National Institutes of Health patient information sites:  http://win.niddk.nih.gov/publications/index.htm    And look into health and wellness programs that may be available through your health insurance provider, employer, local community center, or rahul club.          Surgery:    Surgery for obstructive sleep apnea is considered generally only when other therapies fail to work. Surgery may be discussed with you if you are having a difficult time tolerating CPAP and or when there is an abnormal structure that requires surgical correction.  Nose and throat surgeries often enlarge the airway to prevent collapse.  Most of these surgeries create pain for 1-2 weeks and up to half of the most common surgeries are not effective throughout life.  You should carefully discuss the benefits and drawbacks to surgery with your sleep provider and surgeon to determine if it is the best solution for you.   More information  Surgery for TANG is directed at areas that are responsible for narrowing or complete obstruction of the airway during sleep.  There are a wide range of procedures available to enlarge and/or stabilize the airway to prevent blockage of breathing in the three major areas where it can occur: the palate, tongue, and nasal regions.  Successful surgical treatment depends on the accurate identification of  the factors responsible for obstructive sleep apnea in each person.  A personalized approach is required because there is no single treatment that works well for everyone.  Because of anatomic variation, consultation with an examination by a sleep surgeon is a critical first step in determining what surgical options are best for each patient.  In some cases, examination during sedation may be recommended in order to guide the selection of procedures.  Patients will be counseled about risks and benefits as well as the typical recovery course after surgery. Surgery is typically not a cure for a person s TANG.  However, surgery will often significantly improve one s TANG severity (termed  success rate ).  Even in the absence of a cure, surgery will decrease the cardiovascular risk associated with OSA7; improve overall quality of life8 (sleepiness, functionality, sleep quality, etc).      Palate Procedures:  Patients with TANG often have narrowing of their airway in the region of their tonsils and uvula.  The goals of palate procedures are to widen the airway in this region as well as to help the tissues resist collapse.  Modern palate procedure techniques focus on tissue conservation and soft tissue rearrangement, rather than tissue removal.  Often the uvula is preserved in this procedure. Residual sleep apnea is common in patient after pharyngoplasty with an average reduction in sleep apnea events of 33%2.      Tongue Procedures:  ExamWhile patients are awake, the muscles that surround the throat are active and keep this region open for breathing. These muscles relax during sleep, allowing the tongue and other structures to collapse and block breathing.  There are several different tongue procedures available.  Selection of a tongue base procedure depends on characteristics seen on physical exam.  Generally, procedures are aimed at removing bulky tissues in this area or preventing the back of the tongue from falling back  during sleep.  Success rates for tongue surgery range from 50-62%3.    Hypoglossal Nerve Stimulation:  Hypoglossal nerve stimulation has recently received approval from the [place] Food and Drug Administration for the treatment of obstructive sleep apnea.  This is based on research showing that the system was safe and effective in treating sleep apnea6.  Results showed that the median AHI score decreased 68%, from 29.3 to 9.0. This therapy uses an implant system that senses breathing patterns and delivers mild stimulation to airway muscles, which keeps the airway open during sleep.  The system consists of three fully implanted components: a small generator (similar in size to a pacemaker), a breathing sensor, and a stimulation lead.  Using a small handheld remote, a patient turns the therapy on before bed and off upon awakening.    Candidates for this device must be greater than 22 years of age, have moderate to severe TANG (AHI between 20-65), BMI less than 32, have tried CPAP/oral appliance without success, and have appropriate upper airway anatomy (determined by a sleep endoscopy performed by Dr. Yan).    Hypoglossal Nerve Stimulation Pathway:    The sleep surgeon s office will work with the patient through the insurance prior-authorization process (including communications and appeals).    Nasal Procedures:  Nasal obstruction can interfere with nasal breathing during the day and night.  Studies have shown that relief of nasal obstruction can improve the ability of some patients to tolerate positive airway pressure therapy for obstructive sleep apnea1.  Treatment options include medications such as nasal saline, topical corticosteroid and antihistamine sprays, and oral medications such as antihistamines or decongestants. Non-surgical treatments can include external nasal dilators for selected patients. If these are not successful by themselves, surgery can improve the nasal airway either alone or in  combination with these other options.      Combination Procedures:  Combination of surgical procedures and other treatments may be recommended, particularly if patients have more than one area of narrowing or persistent positional disease.  The success rate of combination surgery ranges from 66-80%2,3.    References  1. Kesha MCKNIGHT. The Role of the Nose in Snoring and Obstructive Sleep Apnoea: An Update.  Eur Arch Otorhinolaryngol. 2011; 268: 1365-73.  2.  Carissa SM; Maria Elena JA; Ashley JR; Pallanch JF; Isreal MB; Cruz SG; Tod RICE. Surgical modifications of the upper airway for obstructive sleep apnea in adults: a systematic review and meta-analysis. SLEEP 2010;33(10):0635-9354. Alexandru DE OLIVEIRA. Hypopharyngeal surgery in obstructive sleep apnea: an evidence-based medicine review.  Arch Otolaryngol Head Neck Surg. 2006 Feb;132(2):206-13.  3. Will YH1, Kalina Y, [place] ARTEMIO. The efficacy of anatomically based multilevel surgery for obstructive sleep apnea. Otolaryngol Head Neck Surg. 2003 Oct;129(4):327-35.  4. Alexandru DE OLIVEIRA, Goldberg A. Hypopharyngeal Surgery in Obstructive Sleep Apnea: An Evidence-Based Medicine Review. Arch Otolaryngol Head Neck Surg. 2006 Feb;132(2):206-13.  5. Aneesh GARCIA et al. Upper-Airway Stimulation for Obstructive Sleep Apnea.  N Engl J Med. 2014 Jan 9;370(2):139-49.  6. Tanya Y et al. Increased Incidence of Cardiovascular Disease in Middle-aged Men with Obstructive Sleep Apnea. Am J Respir Crit Care Med; 2002 166: 159-165  7. Horowitz EM et al. Studying Life Effects and Effectiveness of Palatopharyngoplasty (SLEEP) study: Subjective Outcomes of Isolated Uvulopalatopharyngoplasty. Otolaryngol Head Neck Surg. 2011; 144: 623-631.    Drive Safe... Drive Alive     Sleep health profoundly affects your health, mood, and your safety.  Thirty three percent of the population (one in three of us) is not getting enough sleep and many have a sleep disorder. Not getting enough sleep or having an untreated /  undertreated sleep condition may make us sleepy without even knowing it. In fact, our driving could be dramatically impaired due to our sleep health. As your provider, here are some things I would like you to know about driving:     Here are some warning signs for impairment and dangerous drowsy driving:              -Having been awake more than 16 hours               -Looking tired               -Eyelid drooping              -Head nodding (it could be too late at this point)              -Driving for more than 30 minutes     Some things you could do to make the driving safer if you are experiencing some drowsiness:              -Stop driving and rest              -Call for transportation              -Make sure your sleep disorder is adequately treated     Some things that have been shown NOT to work when experiencing drowsiness while driving:              -Turning on the radio              -Opening windows              -Eating any  distracting  /  entertaining  foods (e.g., sunflower seeds, candy, or any other)              -Talking on the phone      Your decision may not only impact your life, but also the life of others. Please, remember to drive safe for yourself and all of us.

## 2021-07-13 DIAGNOSIS — E78.5 HYPERLIPIDEMIA LDL GOAL <130: ICD-10-CM

## 2021-07-14 RX ORDER — ATORVASTATIN CALCIUM 20 MG/1
TABLET, FILM COATED ORAL
Qty: 90 TABLET | Refills: 0 | Status: SHIPPED | OUTPATIENT
Start: 2021-07-14 | End: 2022-09-09

## 2021-07-14 NOTE — TELEPHONE ENCOUNTER
Pending Prescriptions:                       Disp   Refills    atorvastatin (LIPITOR) 20 MG tablet [Pharm*90 tab*0        Sig: TAKE 1 TABLET (20 MG) BY MOUTH DAILY (DUE FOR LABS           FOR REFILLS)    Routing refill request to provider for review/approval because:  A break in medication

## 2021-07-21 ENCOUNTER — OFFICE VISIT (OUTPATIENT)
Dept: SLEEP MEDICINE | Facility: CLINIC | Age: 41
End: 2021-07-21
Payer: COMMERCIAL

## 2021-07-21 DIAGNOSIS — G47.9 SLEEP DISORDER: ICD-10-CM

## 2021-07-21 DIAGNOSIS — R06.83 SNORING: ICD-10-CM

## 2021-07-21 PROCEDURE — G0399 HOME SLEEP TEST/TYPE 3 PORTA: HCPCS | Performed by: OTOLARYNGOLOGY

## 2021-07-21 NOTE — PROGRESS NOTES
Pt is completing a home sleep test. Pt was instructed on how to put on the Noxturnal T3 device and associated equipment before going to bed and given the opportunity to practice putting it on before leaving the sleep center. Pt was reminded to bring the home sleep test kit back to the center tomorrow, at agreed upon time for download and reporting.     Emanuel VALLEJO CMA

## 2021-07-22 ENCOUNTER — DOCUMENTATION ONLY (OUTPATIENT)
Dept: SLEEP MEDICINE | Facility: CLINIC | Age: 41
End: 2021-07-22
Payer: COMMERCIAL

## 2021-07-22 NOTE — PROGRESS NOTES
This HSAT was performed using a Noxturnal T3 device which recorded snore, sound, movement activity, body position, nasal pressure, oronasal thermal airflow, pulse, oximetry and both chest and abdominal respiratory effort. HSAT data was restricted to the time patient states they were in bed.     HSAT was scored using 1B 4% hypopnea rule.     Ahi: 16.4. Snoring was reported as moderate.  Time with SpO2 below 89% was 91.4 minutes.   Overall signal quality was good     Pt will follow up with sleep provider to determine appropriate therapy.     Ordering Provider, Aime Denson PA-C Charles O. BA, [facility], [facility] Clinical Specialist 7/22/2021

## 2021-07-22 NOTE — PROGRESS NOTES
HST POST-STUDY QUESTIONNAIRE    1. What time did you go to bed?  9  2. How long do you think it took to fall asleep?  15  3. What time did you wake up to start the day?  5:15  4. Did you get up during the night at all?  n  5. If you woke up, do you remember approximately what time(s)? 12a, 1:30a,3a  6. Did you have any difficulty with the equipment?  No  7. Did you us any type of treatment with this study?  None  8. Was the head of the bed elevated? No  9. Did you sleep in a recliner?  No  10. Did you stop using CPAP at least 3 days before this test?  NA  11. Any other information you'd like us to know? NA

## 2021-08-15 NOTE — PROGRESS NOTES
"HOME SLEEP STUDY INTERPRETATION    Patient: Theo Pozo  MRN: [id number]  YOB: 1980  Study Date: 2021  Referring Provider: Jay Goodwin; PAC  Ordering Provider: LARON Cullen     Indications for Home Study: Theo Pozo is a 41 year old male with a history of snoring, apneas, daytime \"fatigue\" is a smoker and has hyperlipidemia who presents with symptoms suggestive of obstructive sleep apnea.    Estimated body mass index is 31.79 kg/m  as calculated from the following:    Height as of 21: 1.702 m (5' 7\").    Weight as of 21: 92.1 kg (203 lb).  Total score - [place]: 6 (2021  2:00 PM)  STOP-BAN/8    Data: A full night home sleep study was performed recording the standard physiologic parameters including body position, movement, sound, nasal pressure, thermal oral airflow, chest and abdominal movements with respiratory inductance plethysmography, and oxygen saturation by pulse oximetry. Pulse rate was estimated by oximetry recording. This study was considered adequate based on > 4 hours of quality oximetry and respiratory recording. As specified by the AASM Manual for the Scoring of Sleep and Associated events, version 2.3, Rule VIII.D 1B, 4% oxygen desaturation scoring for hypopneas is used as a standard of care on all home sleep apnea testing.    Analysis Time:  474 minutes    Respiration:   Sleep Associated Hypoxemia: sustained hypoxemia was present. Baseline oxygen saturation was 89.9%.  Time with saturation less than or equal to 88% was 53.7 minutes. The lowest oxygen saturation was 75%.   Snoring: Snoring was present.  Respiratory events: The home study revealed a presence of 49 obstructive apneas and 3 mixed and central apneas. There were 82 hypopneas resulting in a combined apnea/hypopnea index [AHI] of 16.4 events per hour.  AHI was 21 per hour supine, 0 per hour prone, 0 per hour on left side, and 8.2 per hour on right side.   Pattern: Excluding events noted " above, respiratory rate and pattern was Normal.    Position: Percent of time spent: supine - 70.1%, prone - 0%, on left - 8.9%, on right - 20.9%.    Heart Rate: By pulse oximetry tachycardia was noted.     Assessment:   Moderate obstructive sleep apnea.  Sleep associated hypoxemia was present.    Recommendations:  Consider auto-CPAP at 5-15 cmH2O, oral appliance therapy or positional therapy.(possible left sided positioning where AHI wa 0 even though only short time spent on the left side).  Suggest optimizing sleep hygiene and avoiding sleep deprivation.  Weight management.  Consider in lab study if the patient is still symptomatic with optimal therapy since O2 was quite low for moderate TANG(other causes of low baseline O2 may require investigation).    Diagnosis Code(s): Obstructive Sleep Apnea G47.33, Hypoxemia G47.36    Casper Rosen MD,  August 15, 2021   Diplomate, American Board of Otolaryngology, Sleep Medicine

## 2021-08-18 ENCOUNTER — VIRTUAL VISIT (OUTPATIENT)
Dept: SLEEP MEDICINE | Facility: CLINIC | Age: 41
End: 2021-08-18
Payer: COMMERCIAL

## 2021-08-18 DIAGNOSIS — G47.34 NOCTURNAL HYPOXEMIA: ICD-10-CM

## 2021-08-18 DIAGNOSIS — G47.33 MODERATE OBSTRUCTIVE SLEEP APNEA: Primary | ICD-10-CM

## 2021-08-18 PROCEDURE — 99213 OFFICE O/P EST LOW 20 MIN: CPT | Mod: 95 | Performed by: PHYSICIAN ASSISTANT

## 2021-08-18 NOTE — PROGRESS NOTES
Does Theo have a CPAP/Bipap?  No   [place] Sleep Scale: 8      Theo is a 41 year old who is being evaluated via a billable video visit.      How would you like to obtain your AVS? MyChart  If the video visit is dropped, the invitation should be resent by: Text to cell phone: [phone]  Will anyone else be joining your video visit? No      Video Start Time: 0841        Subjective   Theo is a 41 year old who presents for the following health issues study results  Vitals:  No vitals were obtained today due to virtual visit.        Video-Visit Details    Type of service:  Video Visit    Video End Time:8:54 AM    Originating Location (pt. Location): Home    Distant Location (provider location):  [facility]     Platform used for Video Visit: Uguru  Sleep Study Follow-Up Visit:    Date on this visit: 8/18/2021    Theo Pozo comes in today for follow-up of his home sleep study done on 7/21/21 for possible sleep apnea.     AHI was 16.4, with significant desaturations. Supine RDI 21, consistent with moderate SUPINE TANG.    These findings were reviewed with patient.     Past medical/surgical history, family history, social history, medications and allergies were reviewed.      Problem List:  Patient Active Problem List    Diagnosis Date Noted     Sleep disorder 05/17/2021     Priority: Medium     Nicotine dependence, uncomplicated, unspecified nicotine product type 05/17/2021     Priority: Medium     Hyperlipidemia LDL goal <130 11/07/2019     Priority: Medium     Malaise and fatigue 11/07/2019     Priority: Medium     Snoring 11/07/2019     Priority: Medium     Rib pain 09/19/2018     Priority: Medium     Tobacco abuse disorder 10/31/2017     Priority: Medium     Moderate persistent asthma without complication 08/05/2016     Priority: Medium     Epidermal inclusion cyst 02/14/2012     Priority: Medium        Impression/Plan:    Moderate sleep apnea with significant hypoxemia. Will start  auto pap 5-15 CMH2O, check overnight oximetry after on cpap x 1 month.   He will follow up with me in about 3 month(s).     Fifteen minutes spent with patient, all of which were spent face-to-face counseling, consulting, coordinating plan of care.          CC: Jay Goodwin

## 2021-09-03 ENCOUNTER — NURSE TRIAGE (OUTPATIENT)
Dept: NURSING | Facility: CLINIC | Age: 41
End: 2021-09-03

## 2021-09-03 ENCOUNTER — ANCILLARY PROCEDURE (OUTPATIENT)
Dept: GENERAL RADIOLOGY | Facility: CLINIC | Age: 41
End: 2021-09-03
Attending: NURSE PRACTITIONER
Payer: COMMERCIAL

## 2021-09-03 ENCOUNTER — OFFICE VISIT (OUTPATIENT)
Dept: FAMILY MEDICINE | Facility: CLINIC | Age: 41
End: 2021-09-03
Payer: COMMERCIAL

## 2021-09-03 VITALS
RESPIRATION RATE: 14 BRPM | OXYGEN SATURATION: 94 % | HEART RATE: 86 BPM | TEMPERATURE: 97.8 F | DIASTOLIC BLOOD PRESSURE: 68 MMHG | WEIGHT: 212 LBS | SYSTOLIC BLOOD PRESSURE: 106 MMHG | BODY MASS INDEX: 32.13 KG/M2 | HEIGHT: 68 IN

## 2021-09-03 DIAGNOSIS — R07.81 RIB PAIN ON LEFT SIDE: Primary | ICD-10-CM

## 2021-09-03 DIAGNOSIS — R07.81 RIB PAIN ON LEFT SIDE: ICD-10-CM

## 2021-09-03 LAB
ALBUMIN UR-MCNC: NEGATIVE MG/DL
ANION GAP SERPL CALCULATED.3IONS-SCNC: 3 MMOL/L (ref 3–14)
APPEARANCE UR: CLEAR
BILIRUB UR QL STRIP: NEGATIVE
BUN SERPL-MCNC: 14 MG/DL (ref 7–30)
CALCIUM SERPL-MCNC: 9.5 MG/DL (ref 8.5–10.1)
CHLORIDE BLD-SCNC: 109 MMOL/L (ref 94–109)
CO2 SERPL-SCNC: 29 MMOL/L (ref 20–32)
COLOR UR AUTO: YELLOW
CREAT SERPL-MCNC: 1.05 MG/DL (ref 0.66–1.25)
GFR SERPL CREATININE-BSD FRML MDRD: 87 ML/MIN/1.73M2
GLUCOSE BLD-MCNC: 97 MG/DL (ref 70–99)
GLUCOSE UR STRIP-MCNC: NEGATIVE MG/DL
HGB UR QL STRIP: NEGATIVE
KETONES UR STRIP-MCNC: NEGATIVE MG/DL
LEUKOCYTE ESTERASE UR QL STRIP: NEGATIVE
NITRATE UR QL: NEGATIVE
PH UR STRIP: 6 [PH] (ref 5–7)
POTASSIUM BLD-SCNC: 4.4 MMOL/L (ref 3.4–5.3)
SODIUM SERPL-SCNC: 141 MMOL/L (ref 133–144)
SP GR UR STRIP: 1.02 (ref 1–1.03)
UROBILINOGEN UR STRIP-ACNC: 0.2 E.U./DL

## 2021-09-03 PROCEDURE — 71101 X-RAY EXAM UNILAT RIBS/CHEST: CPT | Mod: LT | Performed by: RADIOLOGY

## 2021-09-03 PROCEDURE — 81003 URINALYSIS AUTO W/O SCOPE: CPT | Performed by: NURSE PRACTITIONER

## 2021-09-03 PROCEDURE — 99214 OFFICE O/P EST MOD 30 MIN: CPT | Performed by: NURSE PRACTITIONER

## 2021-09-03 PROCEDURE — 36415 COLL VENOUS BLD VENIPUNCTURE: CPT | Performed by: NURSE PRACTITIONER

## 2021-09-03 PROCEDURE — 80048 BASIC METABOLIC PNL TOTAL CA: CPT | Performed by: NURSE PRACTITIONER

## 2021-09-03 ASSESSMENT — MIFFLIN-ST. JEOR: SCORE: 1823.19

## 2021-09-03 NOTE — PROGRESS NOTES
Assessment & Plan     Rib pain on left side  Differential diagnosis includes costochondritis, renal stone, spontaneous rib fracture, lung infection, muscle skeletal pain of thoracic region.  Home instructions reviewed recommend heat ice alternating of this area.  Also recommend weight loss and strengthening of core and back for future.  Recommend tobacco cessation as well.  - XR Ribs & Chest Left G/E 3 Views; Future  - Basic metabolic panel  (Ca, Cl, CO2, Creat, Gluc, K, Na, BUN)  - UA Macro with Reflex to Micro and Culture - lab collect             Patient Instructions     Patient Education     Chest Wall Pain: Costochondritis    The chest pain that you have had today is caused by costochondritis. This condition is caused by an inflammation of the cartilage joining your ribs to your breastbone. It's not caused by heart or lung problems. Your healthcare team has made sure that the chest pain you feel is not from a life threatening cause of chest pain such as heart attack, collapsed lung, blood clot in the lung, tear in the aorta, or esophageal rupture. The inflammation may have been brought on by a blow to the chest, lifting heavy objects, intense exercise, or an illness that made you cough and sneeze a lot. It often occurs during times of emotional stress. It can be painful, but it's not dangerous. It usually goes away in 1 to 2 weeks. But it may happen again. Rarely, a more serious condition may cause symptoms similar to costochondritis. That s why it s important to watch for the warning signs listed below.   Home care  Follow these guidelines when caring for yourself at home:    If you feel that emotional stress is a cause of your condition, try to figure out the sources of that stress. It may not be obvious. Learn ways to deal with the stress in your life. This can include regular exercise, muscle relaxation, meditation, or simply taking time out for yourself.    You may use acetaminophen, ibuprofen, or naproxen  to control pain, unless another pain medicine was prescribed. If you have liver or kidney disease or ever had a stomach ulcer, talk with your healthcare provider before using these medicines.    You can also help ease pain by using a hot, wet compress or heating pad. Use this with or without a medicated skin cream that helps relieves pain.    Do stretching exercise as advised by your provider. Typically rest is beneficial for the first few days. Avoid strenuous activity that worsens the pain.    Take any prescribed medicines as directed.  Follow-up care  Follow up with your healthcare provider, or as advised.   When to seek medical advice  Call your healthcare provider right away if any of these occur:    A change in the type of pain. Call if it feels different, becomes more serious, lasts longer, or spreads into your shoulder, arm, neck, jaw, or back.    Shortness of breath or pain gets worse when you breathe    Weakness, dizziness, or fainting    Cough with dark-colored sputum (phlegm) or blood    Abdominal pain    Dark red or black stools    Fever of 100.4 F (38 C) or higher, or as directed by your healthcare provider  MADS last reviewed this educational content on 6/1/2019 2000-2021 [employer]. All rights reserved. This information is not intended as a substitute for professional medical care. Always follow your healthcare professional's instructions.               SHAHAB Dumont [facility]     Theo Pozo is a 41 year old male who presents to clinic today for the following health issues accompanied by his :    HPI     Abdominal/Flank Pain  Onset/Duration: 2 mos  Description:   Character: Sharp  Location: left flank  Radiation: None  Intensity: moderate  Progression of Symptoms:  worsening  Accompanying Signs & Symptoms:  Fever/Chills: no  Gas/Bloating: no  Nausea: no  Vomitting: no  Diarrhea: no  Constipation: no  Dysuria or Hematuria:  "no  History:   Trauma: no  Previous similar pain: no  Previous tests done: none  Precipitating factors:   Does the pain change with:     Food: no    Bowel Movement: no    Urination: no   Other factors:  no  Therapies tried and outcome: ice, rest    Patient states pain started about a week ago this has happened prior but was not as intense.  Pain is located left lateral chest posterior near rib # 9.  Denies shortness of breath dyspnea or anterior chest wall pain patient does have a history of asthma with a recent mild cough he also is a tobacco smoker  Denies issues with urination or bowel movements states he has been taking quite a bit of ibuprofen recently due to chronic back pain.      Review of Systems   Constitutional, HEENT, cardiovascular, pulmonary, GI, , musculoskeletal, neuro, skin, endocrine and psych systems are negative, except as otherwise noted.      Objective    /68   Pulse 86   Temp 97.8  F (36.6  C) (Temporal)   Resp 14   Ht 1.715 m (5' 7.5\")   Wt 96.2 kg (212 lb)   SpO2 94%   BMI 32.71 kg/m    Body mass index is 32.71 kg/m .  Physical Exam   GENERAL: healthy, alert and no distress  EYES: Eyes grossly normal to inspection, PERRL and conjunctivae and sclerae normal  HENT: ear canals and TM's normal, nose and mouth without ulcers or lesions  NECK: no adenopathy, no asymmetry, masses, or scars and thyroid normal to palpation  RESP: lungs clear to auscultation - no rales, rhonchi or wheezes  CV: regular rate and rhythm, normal S1 S2, no S3 or S4, no murmur, click or rub, no peripheral edema and peripheral pulses strong  ABDOMEN: soft, nontender, no hepatosplenomegaly, no masses and bowel sounds normal  MS: Pain with deep palpation posterior left near rib 9 that is radiating mildly into the front rib cage does not have pain below this area does not have pain in abdomen.  SKIN: no suspicious lesions or rashes  NEURO: Normal strength and tone, mentation intact and speech normal  PSYCH: " mentation appears normal, affect normal/bright          Answers for HPI/ROS submitted by the patient on 9/3/2021  What do you think is the original cause of your back pain?: not sure  When did you first notice your back pain? : in the last week  How would you describe your back pain? : sharp  How often do you feel your back pain? : daily  Where is your back pain located? : right lower back, left lower back, right buttock, left buttock, right hip, left hip, right side of waist, left side of waist  Where does your back pain spread? : right buttocks, left buttocks, left knee  Since you noticed your back pain, how has it changed? : always present, but gets better and worse  Does your back pain interfere with your job?: Yes  On a scale of 1-10 (10 being the worst), how strong is your back pain?: 6  What makes your back pain worse? : bending, coughing, certain positions, lying down, twisting  Acupuncture:: not tried  Acetaminophen: not tried  Activity or Exercise: not tried  Chiropractor: not helpful  Cold: not tried  Heat: not helpful  Massage: not tried  Muscle relaxants : not tried  NSAIDS (Ibuprofen, Naproxen) : helpful  Opioids: not tried  Physical Therapy: not tried  Rest: not helpful  Steroid Injection: not tried  Stretching : not tried  Surgery: not tried  TENS Unit: not tried  Topical pain relievers : not helpful  Do you see any other healthcare providers for your back pain? : Chiropractor

## 2021-09-03 NOTE — TELEPHONE ENCOUNTER
Patient is complaining of left side rib pain. Patient rates the pain 9/10 when taking deep breaths and moves. Caller says the pain is sharp. No other symptoms noted. Triage guidelines recommend to see pcp within 24 hours. Caller verbalized and understands directives.  COVID 19 Nurse Triage Plan/Patient Instructions    Please be aware that novel coronavirus (COVID-19) may be circulating in the community. If you develop symptoms such as fever, cough, or SOB or if you have concerns about the presence of another infection including coronavirus (COVID-19), please contact your health care provider or visit [email or web address].     Disposition/Instructions    In-Person Visit with provider recommended. Reference Visit Selection Guide.    Thank you for taking steps to prevent the spread of this virus.  o Limit your contact with others.  o Wear a simple mask to cover your cough.  o Wash your hands well and often.    Resources    [facility]: About COVID-19: [email or web address]    CDC: What to Do If You're Sick: www.cdc.gov/coronavirus/2019-ncov/about/steps-when-sick.html    CDC: Ending Home Isolation: www.cdc.gov/coronavirus/2019-ncov/hcp/disposition-in-home-patients.html     CDC: Caring for Someone: www.cdc.gov/coronavirus/2019-ncov/if-you-are-sick/care-for-someone.html     [facility]: Interim Guidance for Hospital Discharge to Home: www.health.[facility].[place]/diseases/coronavirus/hcp/hospdischarge.pdf    [facility] clinical trials (COVID-19 research studies): [email or web address]     Below are the COVID-19 hotlines at the [place] Department of Health ([facility]). Interpreters are available.   o For health questions: Call 666-025-7202 or 1-330.830.8983 (7 a.m. to 7 p.m.)  o For questions about schools and childcare: Call 621-109-7315 or 1-843.290.1245 (7 a.m. to 7 p.m.)                     Reason for Disposition    MODERATE pain (e.g., interferes with normal activities or  "awakens from sleep)    Additional Information    Negative: Passed out (i.e., lost consciousness, collapsed and was not responding)    Negative: Shock suspected (e.g., cold/pale/clammy skin, too weak to stand, low BP, rapid pulse)    Negative: Difficult to awaken or acting confused (e.g., disoriented, slurred speech)    Negative: Sounds like a life-threatening emergency to the triager    Negative: Followed a major injury to the back (e.g., MVA, fall > 10 feet or 3 meters, penetrating injury, etc.)    Negative: Back pain or flank pain during pregnancy    Negative: Upper, mid or lower back pain that occurs mainly in the midline    Negative: [1] SEVERE pain (e.g., excruciating, scale 8-10) AND [2] present > 1 hour    Negative: [1] SEVERE pain (e.g., excruciating, scale 8-10) AND [2] not improved after pain medicine    Negative: [1] Sudden onset of severe flank pain AND [2] age > 60    Negative: [1] Abdominal pain AND [2] age > 60    Negative: [1] Unable to urinate (or only a few drops) > 4 hours AND     [2] bladder feels very full (e.g., palpable bladder or strong urge to urinate)    Negative: Vomiting    Negative: Weakness of a leg or foot (e.g., unable to bear weight, dragging foot)    Negative: Patient sounds very sick or weak to the triager    Negative: Fever > 100.4 F (38.0 C)    Negative: Pain or burning with passing urine (urination)    Negative: Pain radiates into groin, scrotum    Negative: Blood in urine (red, pink, or tea-colored)    Negative: Pregnant  (Exception: mild pain that is only present with movement)    Negative: Diabetes mellitus or weak immune system (e.g., HIV positive, cancer chemo, splenectomy, organ transplant, chronic steroids) (Exception: mild pain that is only present with movement)    Negative: Rash in same area as pain (may be described as \"small blisters\")    Protocols used: FLANK PAIN-A-AH      "

## 2021-09-03 NOTE — LETTER
September 8, 2021      Theo Pozo  [address] [place] [id number]        Dear ,    We are writing to inform you of your test results.    Your test results fall within the expected range(s) or remain unchanged from previous results.  Please continue with current treatment plan.    Resulted Orders   Basic metabolic panel  (Ca, Cl, CO2, Creat, Gluc, K, Na, BUN)   Result Value Ref Range    Sodium 141 133 - 144 mmol/L    Potassium 4.4 3.4 - 5.3 mmol/L    Chloride 109 94 - 109 mmol/L    Carbon Dioxide (CO2) 29 20 - 32 mmol/L    Anion Gap 3 3 - 14 mmol/L    Urea Nitrogen 14 7 - 30 mg/dL    Creatinine 1.05 0.66 - 1.25 mg/dL    Calcium 9.5 8.5 - 10.1 mg/dL    Glucose 97 70 - 99 mg/dL    GFR Estimate 87 >60 mL/min/1.73m2      Comment:      As of July 11, 2021, eGFR is calculated by the CKD-EPI creatinine equation, without race adjustment. eGFR can be influenced by muscle mass, exercise, and diet. The reported eGFR is an estimation only and is only applicable if the renal function is stable.   UA Macro with Reflex to Micro and Culture - lab collect   Result Value Ref Range    Color Urine Yellow Colorless, Straw, Light Yellow, Yellow    Appearance Urine Clear Clear    Glucose Urine Negative Negative mg/dL    Bilirubin Urine Negative Negative    Ketones Urine Negative Negative mg/dL    Specific Gravity Urine 1.025 1.003 - 1.035    Blood Urine Negative Negative    pH Urine 6.0 5.0 - 7.0    Protein Albumin Urine Negative Negative mg/dL    Urobilinogen Urine 0.2 0.2, 1.0 E.U./dL    Nitrite Urine Negative Negative    Leukocyte Esterase Urine Negative Negative    Narrative    Microscopic not indicated       If you have any questions or concerns, please call the clinic at the number listed above.       Sincerely,      Arlene SHAHAB Tracey CNP

## 2021-09-03 NOTE — PATIENT INSTRUCTIONS
Patient Education     Chest Wall Pain: Costochondritis    The chest pain that you have had today is caused by costochondritis. This condition is caused by an inflammation of the cartilage joining your ribs to your breastbone. It's not caused by heart or lung problems. Your healthcare team has made sure that the chest pain you feel is not from a life threatening cause of chest pain such as heart attack, collapsed lung, blood clot in the lung, tear in the aorta, or esophageal rupture. The inflammation may have been brought on by a blow to the chest, lifting heavy objects, intense exercise, or an illness that made you cough and sneeze a lot. It often occurs during times of emotional stress. It can be painful, but it's not dangerous. It usually goes away in 1 to 2 weeks. But it may happen again. Rarely, a more serious condition may cause symptoms similar to costochondritis. That s why it s important to watch for the warning signs listed below.   Home care  Follow these guidelines when caring for yourself at home:    If you feel that emotional stress is a cause of your condition, try to figure out the sources of that stress. It may not be obvious. Learn ways to deal with the stress in your life. This can include regular exercise, muscle relaxation, meditation, or simply taking time out for yourself.    You may use acetaminophen, ibuprofen, or naproxen to control pain, unless another pain medicine was prescribed. If you have liver or kidney disease or ever had a stomach ulcer, talk with your healthcare provider before using these medicines.    You can also help ease pain by using a hot, wet compress or heating pad. Use this with or without a medicated skin cream that helps relieves pain.    Do stretching exercise as advised by your provider. Typically rest is beneficial for the first few days. Avoid strenuous activity that worsens the pain.    Take any prescribed medicines as directed.  Follow-up care  Follow up with  your healthcare provider, or as advised.   When to seek medical advice  Call your healthcare provider right away if any of these occur:    A change in the type of pain. Call if it feels different, becomes more serious, lasts longer, or spreads into your shoulder, arm, neck, jaw, or back.    Shortness of breath or pain gets worse when you breathe    Weakness, dizziness, or fainting    Cough with dark-colored sputum (phlegm) or blood    Abdominal pain    Dark red or black stools    Fever of 100.4 F (38 C) or higher, or as directed by your healthcare provider  [place] last reviewed this educational content on 6/1/2019 2000-2021 [employer]. All rights reserved. This information is not intended as a substitute for professional medical care. Always follow your healthcare professional's instructions.

## 2021-09-07 ENCOUNTER — TELEPHONE (OUTPATIENT)
Dept: FAMILY MEDICINE | Facility: OTHER | Age: 41
End: 2021-09-07

## 2021-09-07 NOTE — RESULT ENCOUNTER NOTE
Please advise Theo Pozo,  1980, that his xray results were negative for fracture or lung disorder per radiology read.   [phone] (home) [phone] (work)  Thank you  Arlene Tsai CNP

## 2021-09-07 NOTE — RESULT ENCOUNTER NOTE
Please advise Theo Pozo,  1980, that his lab results were normal urine and metabolic panel including electrolytes and kidney function.   [phone] (home) [phone] (work)  Thank you  Arlene Tsai CNP

## 2021-09-08 ENCOUNTER — TELEPHONE (OUTPATIENT)
Dept: FAMILY MEDICINE | Facility: CLINIC | Age: 41
End: 2021-09-08

## 2021-09-08 ASSESSMENT — ASTHMA QUESTIONNAIRES: ACT_TOTALSCORE: 25

## 2021-09-08 NOTE — TELEPHONE ENCOUNTER
----- Message from SHAHAB Underwood CNP sent at 2021 10:08 AM CDT -----  Please advise Theo Pozo,  1980, that his xray results were negative for fracture or lung disorder per radiology read.   [phone] (home) [phone] (work)  Thank you  Arlene Tsai CNP

## 2021-09-23 ENCOUNTER — DOCUMENTATION ONLY (OUTPATIENT)
Dept: SLEEP MEDICINE | Facility: CLINIC | Age: 41
End: 2021-09-23
Payer: COMMERCIAL

## 2021-09-23 NOTE — PROGRESS NOTES
Patient was offered choice of vendor and chose [facility].  Patient Theo Pozo was set up at [place] on September 23, 2021. Patient received a Resmed Airsense 11 Pressures were set at 5-15 cm H2O.   Patient s ramp is 5 cm H2O for Off and FLEX/EPR is EPR.  Patient received a Resmed Mask name: AIRFIT N20  Nasal mask size MED, heated tubing and heated humidifier.  Patient does not need to meet compliance. Patient has a follow up on 11/17/21 with Aime Bell

## 2021-09-27 ENCOUNTER — DOCUMENTATION ONLY (OUTPATIENT)
Dept: SLEEP MEDICINE | Facility: CLINIC | Age: 41
End: 2021-09-27
Payer: COMMERCIAL

## 2021-09-27 NOTE — PROGRESS NOTES
3 day Sleep therapy management telephone visit    Diagnostic AHI:  16.4 HST    Confirmed with patient at time of call- N/A Patient is still interested in STM service.       Message left for patient to return call.        Objective data     Order Settings for PAP  CPAP min 5    CPAP max 15    CPAP fixed         Device settings from machine CPAP min     CPAP max     CPAP fixed     EPR Setting     RESMED soft response       Assessment: Minimal usage      Action plan: Patient to have 14 day STM visit. Patient has a follow up visit scheduled:   yes within 31-90 days of set up    Replacement device: No  STM ordered by provider: Yes     Total time spent on accessing and  interpreting remote patient PAP therapy data  10 minutes    Total time spent counseling, coaching  and reviewing PAP therapy data with patient  1 minutes    [id number] no                  No

## 2021-10-08 ENCOUNTER — DOCUMENTATION ONLY (OUTPATIENT)
Dept: SLEEP MEDICINE | Facility: CLINIC | Age: 41
End: 2021-10-08

## 2021-10-08 NOTE — PROGRESS NOTES
14  DAY STM VISIT    Diagnostic AHI:  16.4 HST    Message left for patient to return call     Assessment: Pt not meeting objective benchmarks for compliance       Action plan: waiting for patient to return call.       Device type: Auto-CPAP    PAP settings: CPAP min 5.0 cm  H20       CPAP max 15.0 cm  H20      95th% pressure 9.7 cm  H20        RESMED EPR level Setting: TWO    RESMED Soft response setting:  OFF    Mask type:  Nasal Mask    Objective measures: 14 day rolling measures      Compliance  7 %      Leak  23  lpm  last  upload      AHI 0.88   last  upload      Average number of minutes 66      Objective measure goal  Compliance   Goal >70%  Leak   Goal < 24 lpm  AHI  Goal < 5  Usage  Goal >240        Total time spent on accessing and interpreting remote patient PAP therapy data  1 minutes    Total time spent counseling, coaching  and reviewing PAP therapy data with patient  0 minutes    [id number]  [id number]  no (3 day STM)

## 2021-10-25 ENCOUNTER — DOCUMENTATION ONLY (OUTPATIENT)
Dept: SLEEP MEDICINE | Facility: CLINIC | Age: 41
End: 2021-10-25

## 2021-10-25 NOTE — PROGRESS NOTES
30 DAY STM VISIT    Diagnostic AHI:  16.4 HST    Data only recheck     Assessment: Pt not meeting objective benchmarks for compliance     Action plan: pt to have 6 month STM visit  Patient has scheduled a follow up visit with Aime Denson PA-C on 11/17/21.   Device type: Auto-CPAP  PAP settings: CPAP min 5.0 cm  H20     CPAP max 15.0 cm  H20     RESMED EPR level Setting: TWO    RESMED Soft response setting:  OFF  Mask type:  Nasal Mask  Objective measures: 14 day rolling measures      Compliance  0 %        Objective measure goal  Compliance   Goal >70%  Leak   Goal < 24 lpm  AHI  Goal < 5  Usage  Goal >240        Total time spent on accessing and interpreting remote patient PAP therapy data  2 minutes    Total time spent counseling, coaching  and reviewing PAP therapy data with patient  0 minutes     [id number] this call  [id number] no  at 3 or 14 day [facility]

## 2021-12-02 DIAGNOSIS — J45.40 MODERATE PERSISTENT ASTHMA WITHOUT COMPLICATION: ICD-10-CM

## 2021-12-02 NOTE — TELEPHONE ENCOUNTER
Pt needs today if possible  Frieda Roca, Pharmacy [facility] [place] [phone]

## 2021-12-22 ENCOUNTER — NURSE TRIAGE (OUTPATIENT)
Dept: NURSING | Facility: CLINIC | Age: 41
End: 2021-12-22
Payer: COMMERCIAL

## 2021-12-22 NOTE — TELEPHONE ENCOUNTER
Fever and chills. Has an appointment tomorrow at 10 a.m. Started with symptoms yesterday. Is it still okay to come in? No longer has fever or chills. Has stuffy nose and headache.  I told him he can keep the appointment.  Suzanne Newman RN  [facility]    Additional Information    Negative: Nursing judgment    Negative: Nursing judgment    Negative: Nursing judgment    Negative: Nursing judgment    Information only question and nurse able to answer    Protocols used: INFORMATION ONLY CALL - NO TRIAGE-A-OH

## 2021-12-23 ENCOUNTER — VIRTUAL VISIT (OUTPATIENT)
Dept: FAMILY MEDICINE | Facility: OTHER | Age: 41
End: 2021-12-23
Payer: COMMERCIAL

## 2021-12-23 DIAGNOSIS — J45.40 MODERATE PERSISTENT ASTHMA WITHOUT COMPLICATION: ICD-10-CM

## 2021-12-23 DIAGNOSIS — U07.1 INFECTION DUE TO 2019 NOVEL CORONAVIRUS: Primary | ICD-10-CM

## 2021-12-23 PROCEDURE — 99213 OFFICE O/P EST LOW 20 MIN: CPT | Mod: 95 | Performed by: PHYSICIAN ASSISTANT

## 2021-12-23 RX ORDER — DEXAMETHASONE 6 MG/1
6 TABLET ORAL DAILY
Qty: 7 TABLET | Refills: 0 | Status: SHIPPED | OUTPATIENT
Start: 2021-12-23 | End: 2022-09-09

## 2021-12-23 NOTE — PROGRESS NOTES
"Theo is a 41 year old who is being evaluated via a billable video visit.      How would you like to obtain your AVS? Mail a copy  If the video visit is dropped, the invitation should be resent by: Text to cell phone: [phone]  Will anyone else be joining your video visit? No    Video Start Time: 1005    Assessment & Plan     Infection due to 2019 novel coronavirus  Test positive.  Needs concrete evidence for his work.  Treat as in 3 to 4 weeks for physical exam.  - dexamethasone (DECADRON) 6 MG tablet; Take 1 tablet (6 mg) by mouth daily  - Symptomatic; Yes; 12/21/2021 COVID-19 Virus (Coronavirus) by PCR Nose; Future    Moderate persistent asthma without complication  Fairly good control over his asthma the exception of now he has COVID-19 by home test.  - fluticasone-salmeterol (ADVAIR) 500-50 MCG/DOSE inhaler; Inhale 1 puff into the lungs every 12 hours  - dexamethasone (DECADRON) 6 MG tablet; Take 1 tablet (6 mg) by mouth daily  - Symptomatic; Yes; 12/21/2021 COVID-19 Virus (Coronavirus) by PCR Nose; Future     BMI:   Estimated body mass index is 32.71 kg/m  as calculated from the following:    Height as of 9/3/21: 1.715 m (5' 7.5\").    Weight as of 9/3/21: 96.2 kg (212 lb).   Weight management plan: Discussed healthy diet and exercise guidelines    No follow-ups on file.    Jay Bernstein PA-C  [facility]   Theo is a 41 year old who presents for the following health issues     HPI     Needs inhalers refilled      Review of Systems   Constitutional, HEENT, cardiovascular, pulmonary, GI, , musculoskeletal, neuro, skin, endocrine and psych systems are negative, except as otherwise noted.      Objective           Vitals:  No vitals were obtained today due to virtual visit.    Physical Exam   GENERAL: Healthy, alert and no distress  EYES: Eyes grossly normal to inspection.  No discharge or erythema, or obvious scleral/conjunctival abnormalities.  RESP: No audible wheeze, " cough, or visible cyanosis.  No visible retractions or increased work of breathing.    SKIN: Visible skin clear. No significant rash, abnormal pigmentation or lesions.  NEURO: Cranial nerves grossly intact.  Mentation and speech appropriate for age.  PSYCH: Mentation appears normal, affect normal/bright, judgement and insight intact, normal speech and appearance well-groomed.    No results found for this or any previous visit (from the past 24 hour(s)).            Video-Visit Details    Type of service:  Video Visit    Video End Time:10:16 AM    Originating Location (pt. Location): Home    Distant Location (provider location):  [facility]     Platform used for Video Visit: [employer]

## 2021-12-27 ENCOUNTER — LAB (OUTPATIENT)
Dept: FAMILY MEDICINE | Facility: CLINIC | Age: 41
End: 2021-12-27
Attending: NURSE PRACTITIONER
Payer: COMMERCIAL

## 2021-12-27 ENCOUNTER — VIRTUAL VISIT (OUTPATIENT)
Dept: FAMILY MEDICINE | Facility: CLINIC | Age: 41
End: 2021-12-27
Payer: COMMERCIAL

## 2021-12-27 DIAGNOSIS — R50.9 FEVER, UNSPECIFIED FEVER CAUSE: ICD-10-CM

## 2021-12-27 DIAGNOSIS — R50.9 FEVER, UNSPECIFIED FEVER CAUSE: Primary | ICD-10-CM

## 2021-12-27 PROCEDURE — U0005 INFEC AGEN DETEC AMPLI PROBE: HCPCS

## 2021-12-27 PROCEDURE — 99213 OFFICE O/P EST LOW 20 MIN: CPT | Mod: TEL | Performed by: NURSE PRACTITIONER

## 2021-12-27 PROCEDURE — U0003 INFECTIOUS AGENT DETECTION BY NUCLEIC ACID (DNA OR RNA); SEVERE ACUTE RESPIRATORY SYNDROME CORONAVIRUS 2 (SARS-COV-2) (CORONAVIRUS DISEASE [COVID-19]), AMPLIFIED PROBE TECHNIQUE, MAKING USE OF HIGH THROUGHPUT TECHNOLOGIES AS DESCRIBED BY CMS-2020-01-R: HCPCS

## 2021-12-27 ASSESSMENT — PAIN SCALES - GENERAL: PAINLEVEL: NO PAIN (0)

## 2021-12-27 NOTE — PROGRESS NOTES
Theo is a 41 year old who is being evaluated via a billable telephone visit.      What phone number would you like to be contacted at? [phone]  How would you like to obtain your AVS? Mail a copy    Assessment & Plan     Fever, unspecified fever cause  Counseled on self-care measures including: OTC acetaminophen PRN, hydration, rest, self quarantine, and red flags of when to return including worsening symptoms.  - Symptomatic; Yes; 12/21/2021 COVID-19 Virus (Coronavirus) by PCR; Future     See Patient Instructions    Return in about 2 weeks (around 1/10/2022), or if symptoms worsen or fail to improve.    Stephanie Kerns, SHAHAB CNP  [facility]   Theo is a 41 year old who presents for the following health issues     HPI   Concern for COVID-19  About how many days ago did these symptoms start? 12/21/2021 symptoms started. He took a COVID home test and that was positive; wanting to confirm test.   Is this your first visit for this illness? Yes  In the 14 days before your symptoms started, have you had close contact with someone with COVID-19 (Coronavirus)? I do not know.  Do you have a fever or chills? Yes, the highest temperature was 101.  Are you having new or worsening difficulty breathing? No  Do you have new or worsening cough? No  Have you had any new or unexplained body aches? No    Have you experienced any of the following NEW symptoms?    Headache: No    Sore throat: No    Loss of taste or smell: No    Chest pain: No    Diarrhea: YES    Rash: No  What treatments have you tried? Taken a lot of Emergen-C packets, zinc, mucinex, decadron, advil and tylenol.   Who do you live with? Self - has daughter every so often over the weekend.   Are you, or a household member, a healthcare worker or a ? No  Do you live in a nursing home, group home, or shelter? No  Do you have a way to get food/medications if quarantined? Yes.     Review of Systems    Constitutional, HEENT, cardiovascular, pulmonary, gi and gu systems are negative, except as otherwise noted.      Objective    Vitals - Patient Reported  Pain Score: No Pain (0)        Physical Exam   healthy, alert and no distress  PSYCH: Alert and oriented times 3; coherent speech, normal   rate and volume, able to articulate logical thoughts, able   to abstract reason, no tangential thoughts, no hallucinations   or delusions  His affect is normal  RESP: No cough, no audible wheezing, able to talk in full sentences  Remainder of exam unable to be completed due to telephone visits              Phone call duration: 5 minutes

## 2021-12-27 NOTE — PATIENT INSTRUCTIONS
Instructions for Patients  It is recommended that you have a test for coronavirus (COVID-19). This illness can cause fever, cough and trouble breathing. Many people get a mild case and get better on their own. Some people can get very sick.     Please follow these steps:    1. We will call to schedule your test.  2. A member of our care team will ask you some questions. Then, they will use a swab to collect samples from your nose and throat.     Our testing team will send you your test results.    How can I protect others?    Stay home and away from others (self-isolate) until:    You ve had no fever--and no medicine that reduces fever--for 1 full day (24 hours). And      Your other symptoms have resolved (gotten better). For example, your cough or breathing has improved. And     At least 10 days have passed since your symptoms started.    Stay at least 6 feet away from others. (If someone will drive you to your test, stay in the backseat, as far away from the  as you can.)     Don t go to work, school or anywhere else. When it s time for your test, go straight to the testing site. Don t make any stops on the way there or back.     Wash your hands and face often. Use soap and water.     Cover your mouth and nose with a mask, tissue or washcloth.     Don t touch anyone. No hugging, kissing or handshakes.    How can I take care of myself?    1. Get lots of rest. Drink extra fluids (unless a doctor has told you not to).     2. Take Tylenol (acetaminophen) for fever or pain. If you have liver or kidney problems, ask your family doctor if it's okay to take Tylenol.     Adults can take either:     650 mg (two 325 mg pills) every 4 to 6 hours, or     1,000 mg (two 500 mg pills) every 8 hours as needed.     Note: Don't take more than 3,000 mg in one day.   Acetaminophen is found in many medicines (both prescribed and over-the-counter medicines). Read all labels to be sure you don't take too much.   For children,  check the Tylenol bottle for the right dose. The dose is based on  the child's age or weight.    3. If you have other health problems (like cancer, heart failure, an organ transplant or severe kidney disease): Call your specialty clinic if you don't feel better in the next 2 days.    4. Know when to call 911: If your breathing is so bad that it keeps you from doing normal activities, call 911 or go to the emergency room. Tell them that you've been staying home and may have COVID-19.      Thank you for limiting contact with others, wearing a simple mask to cover your cough, practice good hand hygiene habits and accessing our virtual services where possible to limit the spread of this virus.    For more information about COVID19 and options for caring for yourself at home, please visit the CDC website at https://www.cdc.gov/coronavirus/2019-ncov/about/steps-when-sick.html  For more options for care at [facility], please visit our website at [email or web address]

## 2021-12-28 LAB — SARS-COV-2 RNA RESP QL NAA+PROBE: POSITIVE

## 2022-01-08 ENCOUNTER — HEALTH MAINTENANCE LETTER (OUTPATIENT)
Age: 42
End: 2022-01-08

## 2022-06-19 ENCOUNTER — HEALTH MAINTENANCE LETTER (OUTPATIENT)
Age: 42
End: 2022-06-19

## 2022-09-09 ENCOUNTER — OFFICE VISIT (OUTPATIENT)
Dept: FAMILY MEDICINE | Facility: CLINIC | Age: 42
End: 2022-09-09
Payer: COMMERCIAL

## 2022-09-09 VITALS
TEMPERATURE: 97.6 F | BODY MASS INDEX: 33.49 KG/M2 | DIASTOLIC BLOOD PRESSURE: 74 MMHG | SYSTOLIC BLOOD PRESSURE: 110 MMHG | OXYGEN SATURATION: 96 % | HEART RATE: 83 BPM | RESPIRATION RATE: 16 BRPM | HEIGHT: 68 IN | WEIGHT: 221 LBS

## 2022-09-09 DIAGNOSIS — Z28.21 COVID-19 VACCINATION DECLINED: ICD-10-CM

## 2022-09-09 DIAGNOSIS — Z01.818 PREOP GENERAL PHYSICAL EXAM: Primary | ICD-10-CM

## 2022-09-09 DIAGNOSIS — H26.9 CATARACT OF BOTH EYES, UNSPECIFIED CATARACT TYPE: ICD-10-CM

## 2022-09-09 DIAGNOSIS — Z28.21 TETANUS, DIPHTHERIA, AND ACELLULAR PERTUSSIS (TDAP) VACCINATION DECLINED: ICD-10-CM

## 2022-09-09 DIAGNOSIS — Z28.21 INFLUENZA VACCINATION DECLINED: ICD-10-CM

## 2022-09-09 DIAGNOSIS — E78.5 HYPERLIPIDEMIA LDL GOAL <130: ICD-10-CM

## 2022-09-09 DIAGNOSIS — Z72.0 TOBACCO ABUSE DISORDER: ICD-10-CM

## 2022-09-09 DIAGNOSIS — J45.40 MODERATE PERSISTENT ASTHMA WITHOUT COMPLICATION: ICD-10-CM

## 2022-09-09 DIAGNOSIS — G47.9 SLEEP DISORDER: ICD-10-CM

## 2022-09-09 DIAGNOSIS — Z28.21 PNEUMOCOCCAL VACCINATION DECLINED: ICD-10-CM

## 2022-09-09 PROCEDURE — 99214 OFFICE O/P EST MOD 30 MIN: CPT | Performed by: FAMILY MEDICINE

## 2022-09-09 RX ORDER — KETOROLAC TROMETHAMINE 5 MG/ML
SOLUTION OPHTHALMIC
COMMUNITY
Start: 2022-08-30 | End: 2022-12-01

## 2022-09-09 RX ORDER — MOXIFLOXACIN 5 MG/ML
SOLUTION/ DROPS OPHTHALMIC
COMMUNITY
Start: 2022-08-29 | End: 2022-12-01

## 2022-09-09 RX ORDER — PREDNISOLONE ACETATE 10 MG/ML
SUSPENSION/ DROPS OPHTHALMIC
COMMUNITY
Start: 2022-08-29 | End: 2022-12-01

## 2022-09-09 ASSESSMENT — ASTHMA QUESTIONNAIRES
QUESTION_2 LAST FOUR WEEKS HOW OFTEN HAVE YOU HAD SHORTNESS OF BREATH: NOT AT ALL
QUESTION_3 LAST FOUR WEEKS HOW OFTEN DID YOUR ASTHMA SYMPTOMS (WHEEZING, COUGHING, SHORTNESS OF BREATH, CHEST TIGHTNESS OR PAIN) WAKE YOU UP AT NIGHT OR EARLIER THAN USUAL IN THE MORNING: NOT AT ALL
ACT_TOTALSCORE: 25
ACT_TOTALSCORE: 25
QUESTION_1 LAST FOUR WEEKS HOW MUCH OF THE TIME DID YOUR ASTHMA KEEP YOU FROM GETTING AS MUCH DONE AT WORK, SCHOOL OR AT HOME: NONE OF THE TIME
QUESTION_5 LAST FOUR WEEKS HOW WOULD YOU RATE YOUR ASTHMA CONTROL: COMPLETELY CONTROLLED
QUESTION_4 LAST FOUR WEEKS HOW OFTEN HAVE YOU USED YOUR RESCUE INHALER OR NEBULIZER MEDICATION (SUCH AS ALBUTEROL): NOT AT ALL

## 2022-09-09 ASSESSMENT — PAIN SCALES - GENERAL: PAINLEVEL: NO PAIN (0)

## 2022-09-09 NOTE — PROGRESS NOTES
[facility]  [address]  [place] [address]  Phone: [phone]  Fax: [phone]  Primary Provider: Jay Goodwin  Pre-op Performing Provider: MANISH GARCIA    PREOPERATIVE EVALUATION:  Today's date: 9/9/2022    Theo Pozo is a 42 year old male who presents for a preoperative evaluation.    Surgical Information:  Surgery/Procedure: PHACOEMULSIFICATION WITH A STANDARD INTRAOCULAR LENS IMPLANT  Surgery Location: [facility] OR  Surgeon: Quang Woodward  Surgery Date: 09/15 (right),09/29 (left)  Time of Surgery: 10am  Where patient plans to recover: At home alone  Fax number for surgical facility: Note does not need to be faxed, will be available electronically in Epic.    Type of Anesthesia Anticipated: to be determined    Assessment & Plan     The proposed surgical procedure is considered LOW risk.    1. Preop general physical exam: Medically optimized for proposed procedure.  May proceed as planned.  Declined tetanus.  Chronic conditions controlled.  Able to tolerate 4 METS.  Covid testing through surgeon's office.    2. Cataract of both eyes, unspecified cataract type: Plan for procedures above.    3. Moderate persistent asthma without complication: Controlled. ACT of 25. Encourage smoking cessation.  Contemplative phase of change.  Does not require refills on his inhalers.    4. Hyperlipidemia LDL goal <130: Declines lipid screening today.  ASCVD risk would be 5.9% based off of 2019 lipids prior to lipid screening.  Was not on statin for secondary prevention.  Does not have an LDL above 190.  Does not necessarily require treatment today and statin has been removed from his list.  Other risk factors do include smoking.    5. Tobacco abuse disorder: Encourage smoking cessation.  Discussed options.  Contemplative phase of change.    6. Sleep disorder: History of TANG.    7. Pneumococcal vaccination declined    8. COVID-19 vaccination  declined    9. Influenza vaccination declined    10. Tetanus, diphtheria, and acellular pertussis (Tdap) vaccination declined    Risks and Recommendations:  The patient has the following additional risks and recommendations for perioperative complications:   - No identified additional risk factors other than previously addressed    Medication Instructions:  Patient is to take all scheduled medications on the day of surgery    RECOMMENDATION:  APPROVAL GIVEN to proceed with proposed procedure, without further diagnostic evaluation.    Subjective     HPI related to upcoming procedure: bilateral cataracts     Preop Questions 9/9/2022   1. Have you ever had a heart attack or stroke? No   2. Have you ever had surgery on your heart or blood vessels, such as a stent placement, a coronary artery bypass, or surgery on an artery in your head, neck, heart, or legs? No   3. Do you have chest pain with activity? No   4. Do you have a history of  heart failure? No   5. Do you currently have a cold, bronchitis or symptoms of other infection? No   6. Do you have a cough, shortness of breath, or wheezing? No   7. Do you or anyone in your family have previous history of blood clots? No   8. Do you or does anyone in your family have a serious bleeding problem such as prolonged bleeding following surgeries or cuts? No   9. Have you ever had problems with anemia or been told to take iron pills? No   10. Have you had any abnormal blood loss such as black, tarry or bloody stools? No   11. Have you ever had a blood transfusion? No   12. Are you willing to have a blood transfusion if it is medically needed before, during, or after your surgery? yes   13. Have you or any of your relatives ever had problems with anesthesia? No   14. Do you have sleep apnea, excessive snoring or daytime drowsiness? YES - diagnosis of TANG has CPAP not currently using   14a. Do you have a CPAP machine? Yes   15. Do you have any artifical heart valves or other  implanted medical devices like a pacemaker, defibrillator, or continuous glucose monitor? No   16. Do you have artificial joints? No   17. Are you allergic to latex? No     Health Care Directive:  Patient does not have a Health Care Directive or Living Will: Discussed advance care planning with patient; information given to patient to review.  Marylu De La Cruz (sister) [phone]    Preoperative Review of :   reviewed - no record of controlled substances prescribed.    Status of Chronic Conditions:  ASTHMA - Patient has a longstanding history of moderate-severe Asthma . Patient has been doing well overall noting NO SYMPTOMS and continues on inhaled medication regimen without adverse reactions or side effects.     HYPERLIPIDEMIA - Patient has a long history of significant Hyperlipidemia not currently on medication.    Review of Systems  Constitutional, neuro, ENT, endocrine, pulmonary, cardiac, gastrointestinal, genitourinary, musculoskeletal, integument and psychiatric systems are negative, except as otherwise noted.    Patient Active Problem List    Diagnosis Date Noted     Sleep disorder 05/17/2021     Priority: Medium     Nicotine dependence, uncomplicated, unspecified nicotine product type 05/17/2021     Priority: Medium     Hyperlipidemia LDL goal <130 11/07/2019     Priority: Medium     Malaise and fatigue 11/07/2019     Priority: Medium     Snoring 11/07/2019     Priority: Medium     Rib pain 09/19/2018     Priority: Medium     Tobacco abuse disorder 10/31/2017     Priority: Medium     Moderate persistent asthma without complication 08/05/2016     Priority: Medium     Epidermal inclusion cyst 02/14/2012     Priority: Medium      Past Medical History:   Diagnosis Date     Heartburn      Moderate persistent asthma      Tobacco abuse disorder 10/31/2017     Past Surgical History:   Procedure Laterality Date     ARTHROSCOPY KNEE RT/LT      Right knee for meniscal tear     ENDOSCOPY  2005    negatve findings      SURGICAL HISTORY OF -       nasal surgery for epistaxis and obstruction     Current Outpatient Medications   Medication Sig Dispense Refill     fluticasone-salmeterol (ADVAIR) 500-50 MCG/DOSE inhaler Inhale 1 puff into the lungs every 12 hours 60 each 11     albuterol (PROAIR HFA) 108 (90 Base) MCG/ACT inhaler Inhale 2 puffs into the lungs every 6 hours as needed for wheezing (Patient not taking: Reported on 9/9/2022) 1 Inhaler 3     atorvastatin (LIPITOR) 20 MG tablet TAKE 1 TABLET (20 MG) BY MOUTH DAILY (DUE FOR LABS FOR REFILLS) (Patient not taking: No sig reported) 90 tablet 0     ketorolac (ACULAR) 0.5 % ophthalmic solution INSTILL 1 DROP IN SURGICAL EYE THREE TIMES DAILY USE 2 DAYS BEFORE SURGERY AND 14 DAYS AFTER (Patient not taking: Reported on 9/9/2022)       moxifloxacin (VIGAMOX) 0.5 % ophthalmic solution INSTILL 1 DROP INTO SURGICAL EYE THREE TIMES DAILY USE 2 DAYS BEFORE SURGERY AND 14 DAYS AFTER (Patient not taking: Reported on 9/9/2022)       prednisoLONE acetate (PRED FORTE) 1 % ophthalmic suspension INSTILL 1 DROP INTO SURGICAL EYE THREE TIMES DAILY AS DIRECTED FOR 14 DAYS STARTING AFTER SURGERY (Patient not taking: Reported on 9/9/2022)         Allergies   Allergen Reactions     Penicillins      Sulfa Drugs         Social History     Tobacco Use     Smoking status: Current Every Day Smoker     Packs/day: 1.00     Years: 14.00     Pack years: 14.00     Types: Cigarettes     Smokeless tobacco: Former User     Tobacco comment: Advised to quit    Substance Use Topics     Alcohol use: Yes     Alcohol/week: 0.0 standard drinks     Comment: social infrequently     Family History   Problem Relation Age of Onset     Gastrointestinal Disease Mother         Crohn's disease     Colon Cancer No family hx of      Prostate Cancer No family hx of      Thyroid Cancer No family hx of      Diabetes No family hx of      Hypertension No family hx of      Hyperlipidemia No family hx of      History   Drug Use No  "        Objective     /74 (BP Location: Left arm, Patient Position: Sitting, Cuff Size: Adult Regular)   Pulse 83   Temp 97.6  F (36.4  C) (Temporal)   Resp 16   Ht 1.715 m (5' 7.5\")   Wt 100.2 kg (221 lb)   SpO2 96%   BMI 34.10 kg/m      Physical Exam    GENERAL APPEARANCE: healthy, alert and no distress     EYES: EOMI,  PERRL     HENT: ear canals and TM's normal and nose and mouth without ulcers or lesions     NECK: no adenopathy, no asymmetry, masses, or scars and thyroid normal to palpation     RESP: lungs clear to auscultation - no rales, rhonchi or wheezes     CV: regular rates and rhythm, normal S1 S2, no S3 or S4 and no murmur, click or rub     ABDOMEN:  soft, nontender, no HSM or masses and bowel sounds normal     MS: extremities normal- no gross deformities noted, no evidence of inflammation in joints, FROM in all extremities.     SKIN: no suspicious lesions or rashes     NEURO: Normal strength and tone, sensory exam grossly normal, mentation intact and speech normal     PSYCH: mentation appears normal. and affect normal/bright     LYMPHATICS: No cervical adenopathy    Recent Labs   Lab Test 09/03/21  1425 05/17/21  1125   HGB  --  16.9   PLT  --  316    140   POTASSIUM 4.4 4.3   CR 1.05 0.95      Diagnostics:  No labs were ordered during this visit.   No EKG required for low risk surgery (cataract, skin procedure, breast biopsy, etc).    Revised Cardiac Risk Index (RCRI):  The patient has the following serious cardiovascular risks for perioperative complications:   - No serious cardiac risks = 0 points     RCRI Interpretation: 0 points: Class I (very low risk - 0.4% complication rate)       Signed Electronically by: David Boyd MD  Copy of this evaluation report is provided to requesting physician.  "

## 2022-09-09 NOTE — H&P (VIEW-ONLY)
[facility]  [address]  [place] [address]  Phone: [phone]  Fax: [phone]  Primary Provider: Jay Goodwin  Pre-op Performing Provider: MANSIH GARCIA    PREOPERATIVE EVALUATION:  Today's date: 9/9/2022    Theo Pozo is a 42 year old male who presents for a preoperative evaluation.    Surgical Information:  Surgery/Procedure: PHACOEMULSIFICATION WITH A STANDARD INTRAOCULAR LENS IMPLANT  Surgery Location: [facility] OR  Surgeon: Quang Woodward  Surgery Date: 09/15 (right),09/29 (left)  Time of Surgery: 10am  Where patient plans to recover: At home alone  Fax number for surgical facility: Note does not need to be faxed, will be available electronically in Epic.    Type of Anesthesia Anticipated: to be determined    Assessment & Plan     The proposed surgical procedure is considered LOW risk.    1. Preop general physical exam: Medically optimized for proposed procedure.  May proceed as planned.  Declined tetanus.  Chronic conditions controlled.  Able to tolerate 4 METS.  Covid testing through surgeon's office.    2. Cataract of both eyes, unspecified cataract type: Plan for procedures above.    3. Moderate persistent asthma without complication: Controlled. ACT of 25. Encourage smoking cessation.  Contemplative phase of change.  Does not require refills on his inhalers.    4. Hyperlipidemia LDL goal <130: Declines lipid screening today.  ASCVD risk would be 5.9% based off of 2019 lipids prior to lipid screening.  Was not on statin for secondary prevention.  Does not have an LDL above 190.  Does not necessarily require treatment today and statin has been removed from his list.  Other risk factors do include smoking.    5. Tobacco abuse disorder: Encourage smoking cessation.  Discussed options.  Contemplative phase of change.    6. Sleep disorder: History of TANG.    7. Pneumococcal vaccination declined    8. COVID-19 vaccination  declined    9. Influenza vaccination declined    10. Tetanus, diphtheria, and acellular pertussis (Tdap) vaccination declined    Risks and Recommendations:  The patient has the following additional risks and recommendations for perioperative complications:   - No identified additional risk factors other than previously addressed    Medication Instructions:  Patient is to take all scheduled medications on the day of surgery    RECOMMENDATION:  APPROVAL GIVEN to proceed with proposed procedure, without further diagnostic evaluation.    Subjective     HPI related to upcoming procedure: bilateral cataracts     Preop Questions 9/9/2022   1. Have you ever had a heart attack or stroke? No   2. Have you ever had surgery on your heart or blood vessels, such as a stent placement, a coronary artery bypass, or surgery on an artery in your head, neck, heart, or legs? No   3. Do you have chest pain with activity? No   4. Do you have a history of  heart failure? No   5. Do you currently have a cold, bronchitis or symptoms of other infection? No   6. Do you have a cough, shortness of breath, or wheezing? No   7. Do you or anyone in your family have previous history of blood clots? No   8. Do you or does anyone in your family have a serious bleeding problem such as prolonged bleeding following surgeries or cuts? No   9. Have you ever had problems with anemia or been told to take iron pills? No   10. Have you had any abnormal blood loss such as black, tarry or bloody stools? No   11. Have you ever had a blood transfusion? No   12. Are you willing to have a blood transfusion if it is medically needed before, during, or after your surgery? yes   13. Have you or any of your relatives ever had problems with anesthesia? No   14. Do you have sleep apnea, excessive snoring or daytime drowsiness? YES - diagnosis of TANG has CPAP not currently using   14a. Do you have a CPAP machine? Yes   15. Do you have any artifical heart valves or other  implanted medical devices like a pacemaker, defibrillator, or continuous glucose monitor? No   16. Do you have artificial joints? No   17. Are you allergic to latex? No     Health Care Directive:  Patient does not have a Health Care Directive or Living Will: Discussed advance care planning with patient; information given to patient to review.  Marylu De La Cruz (sister) [phone]    Preoperative Review of :   reviewed - no record of controlled substances prescribed.    Status of Chronic Conditions:  ASTHMA - Patient has a longstanding history of moderate-severe Asthma . Patient has been doing well overall noting NO SYMPTOMS and continues on inhaled medication regimen without adverse reactions or side effects.     HYPERLIPIDEMIA - Patient has a long history of significant Hyperlipidemia not currently on medication.    Review of Systems  Constitutional, neuro, ENT, endocrine, pulmonary, cardiac, gastrointestinal, genitourinary, musculoskeletal, integument and psychiatric systems are negative, except as otherwise noted.    Patient Active Problem List    Diagnosis Date Noted     Sleep disorder 05/17/2021     Priority: Medium     Nicotine dependence, uncomplicated, unspecified nicotine product type 05/17/2021     Priority: Medium     Hyperlipidemia LDL goal <130 11/07/2019     Priority: Medium     Malaise and fatigue 11/07/2019     Priority: Medium     Snoring 11/07/2019     Priority: Medium     Rib pain 09/19/2018     Priority: Medium     Tobacco abuse disorder 10/31/2017     Priority: Medium     Moderate persistent asthma without complication 08/05/2016     Priority: Medium     Epidermal inclusion cyst 02/14/2012     Priority: Medium      Past Medical History:   Diagnosis Date     Heartburn      Moderate persistent asthma      Tobacco abuse disorder 10/31/2017     Past Surgical History:   Procedure Laterality Date     ARTHROSCOPY KNEE RT/LT      Right knee for meniscal tear     ENDOSCOPY  2005    negatve findings      SURGICAL HISTORY OF -       nasal surgery for epistaxis and obstruction     Current Outpatient Medications   Medication Sig Dispense Refill     fluticasone-salmeterol (ADVAIR) 500-50 MCG/DOSE inhaler Inhale 1 puff into the lungs every 12 hours 60 each 11     albuterol (PROAIR HFA) 108 (90 Base) MCG/ACT inhaler Inhale 2 puffs into the lungs every 6 hours as needed for wheezing (Patient not taking: Reported on 9/9/2022) 1 Inhaler 3     atorvastatin (LIPITOR) 20 MG tablet TAKE 1 TABLET (20 MG) BY MOUTH DAILY (DUE FOR LABS FOR REFILLS) (Patient not taking: No sig reported) 90 tablet 0     ketorolac (ACULAR) 0.5 % ophthalmic solution INSTILL 1 DROP IN SURGICAL EYE THREE TIMES DAILY USE 2 DAYS BEFORE SURGERY AND 14 DAYS AFTER (Patient not taking: Reported on 9/9/2022)       moxifloxacin (VIGAMOX) 0.5 % ophthalmic solution INSTILL 1 DROP INTO SURGICAL EYE THREE TIMES DAILY USE 2 DAYS BEFORE SURGERY AND 14 DAYS AFTER (Patient not taking: Reported on 9/9/2022)       prednisoLONE acetate (PRED FORTE) 1 % ophthalmic suspension INSTILL 1 DROP INTO SURGICAL EYE THREE TIMES DAILY AS DIRECTED FOR 14 DAYS STARTING AFTER SURGERY (Patient not taking: Reported on 9/9/2022)         Allergies   Allergen Reactions     Penicillins      Sulfa Drugs         Social History     Tobacco Use     Smoking status: Current Every Day Smoker     Packs/day: 1.00     Years: 14.00     Pack years: 14.00     Types: Cigarettes     Smokeless tobacco: Former User     Tobacco comment: Advised to quit    Substance Use Topics     Alcohol use: Yes     Alcohol/week: 0.0 standard drinks     Comment: social infrequently     Family History   Problem Relation Age of Onset     Gastrointestinal Disease Mother         Crohn's disease     Colon Cancer No family hx of      Prostate Cancer No family hx of      Thyroid Cancer No family hx of      Diabetes No family hx of      Hypertension No family hx of      Hyperlipidemia No family hx of      History   Drug Use No  "        Objective     /74 (BP Location: Left arm, Patient Position: Sitting, Cuff Size: Adult Regular)   Pulse 83   Temp 97.6  F (36.4  C) (Temporal)   Resp 16   Ht 1.715 m (5' 7.5\")   Wt 100.2 kg (221 lb)   SpO2 96%   BMI 34.10 kg/m      Physical Exam    GENERAL APPEARANCE: healthy, alert and no distress     EYES: EOMI,  PERRL     HENT: ear canals and TM's normal and nose and mouth without ulcers or lesions     NECK: no adenopathy, no asymmetry, masses, or scars and thyroid normal to palpation     RESP: lungs clear to auscultation - no rales, rhonchi or wheezes     CV: regular rates and rhythm, normal S1 S2, no S3 or S4 and no murmur, click or rub     ABDOMEN:  soft, nontender, no HSM or masses and bowel sounds normal     MS: extremities normal- no gross deformities noted, no evidence of inflammation in joints, FROM in all extremities.     SKIN: no suspicious lesions or rashes     NEURO: Normal strength and tone, sensory exam grossly normal, mentation intact and speech normal     PSYCH: mentation appears normal. and affect normal/bright     LYMPHATICS: No cervical adenopathy    Recent Labs   Lab Test 09/03/21  1425 05/17/21  1125   HGB  --  16.9   PLT  --  316    140   POTASSIUM 4.4 4.3   CR 1.05 0.95      Diagnostics:  No labs were ordered during this visit.   No EKG required for low risk surgery (cataract, skin procedure, breast biopsy, etc).    Revised Cardiac Risk Index (RCRI):  The patient has the following serious cardiovascular risks for perioperative complications:   - No serious cardiac risks = 0 points     RCRI Interpretation: 0 points: Class I (very low risk - 0.4% complication rate)       Signed Electronically by: David Boyd MD  Copy of this evaluation report is provided to requesting physician.  "

## 2022-09-09 NOTE — PATIENT INSTRUCTIONS
Preparing for Your Surgery  Getting started  A nurse will call you to review your health history and instructions. They will give you an arrival time based on your scheduled surgery time. Please be ready to share:    Your doctor's clinic name and phone number    Your medical, surgical and anesthesia history    A list of allergies and sensitivities    A list of medicines, including herbal treatments and over-the-counter drugs    Whether the patient has a legal guardian (ask how to send us the papers in advance)  Please tell us if you're pregnant--or if there's any chance you might be pregnant. Some surgeries may injure a fetus (unborn baby), so they require a pregnancy test. Surgeries that are safe for a fetus don't always need a test, and you can choose whether to have one.   If you have a child who's having surgery, please ask for a copy of Preparing for Your Child's Surgery.    Preparing for surgery    Within 30 days of surgery: Have a pre-op exam (sometimes called an H&P, or History and Physical). This can be done at a clinic or pre-operative center.  ? If you're having a , you may not need this exam. Talk to your care team.    At your pre-op exam, talk to your care team about all medicines you take. If you need to stop any medicines before surgery, ask when to start taking them again.  ? We do this for your safety. Many medicines can make you bleed too much during surgery. Some change how well surgery (anesthesia) drugs work.    Call your insurance company to let them know you're having surgery. (If you don't have insurance, call [phone].)    Call your clinic if there's any change in your health. This includes signs of a cold or flu (sore throat, runny nose, cough, rash, fever). It also includes a scrape or scratch near the surgery site.    If you have questions on the day of surgery, call your hospital or surgery center.  COVID testing  You may need to be tested for COVID-19 before having  surgery. If so, we will give you instructions.  Eating and drinking guidelines  For your safety: Unless your surgeon tells you otherwise, follow the guidelines below.    Eat and drink as usual until 8 hours before surgery. After that, no food or milk.    Drink clear liquids until 2 hours before surgery. These are liquids you can see through, like water, Gatorade and Propel Water. You may also have black coffee and tea (no cream or milk).    Nothing by mouth within 2 hours of surgery. This includes gum, candy and breath mints.    If you drink alcohol: Stop drinking it the night before surgery.    If your care team tells you to take medicine on the morning of surgery, it's okay to take it with a sip of water.  Preventing infection    Shower or bathe the night before and morning of your surgery. Follow the instructions your clinic gave you. (If no instructions, use regular soap.)    Don't shave or clip hair near your surgery site. We'll remove the hair if needed.    Don't smoke or vape the morning of surgery. You may chew nicotine gum up to 2 hours before surgery. A nicotine patch is okay.  ? Note: Some surgeries require you to completely quit smoking and nicotine. Check with your surgeon.    Your care team will make every effort to keep you safe from infection. We will:  ? Clean our hands often with soap and water (or an alcohol-based hand rub).  ? Clean the skin at your surgery site with a special soap that kills germs.  ? Give you a special gown to keep you warm. (Cold raises the risk of infection.)  ? Wear special hair covers, masks, gowns and gloves during surgery.  ? Give antibiotic medicine, if prescribed. Not all surgeries need antibiotics.  What to bring on the day of surgery    Photo ID and insurance card    Copy of your health care directive, if you have one    Glasses and hearing aides (bring cases)  ? You can't wear contacts during surgery    Inhaler and eye drops, if you use them (tell us about these when  you arrive)    CPAP machine or breathing device, if you use them    A few personal items, if spending the night    If you have . . .  ? A pacemaker, ICD (cardiac defibrillator) or other implant: Bring the ID card.  ? An implanted stimulator: Bring the remote control.  ? A legal guardian: Bring a copy of the certified (court-stamped) guardianship papers.  Please remove any jewelry, including body piercings. Leave jewelry and other valuables at home.  If you're going home the day of surgery    You must have a responsible adult drive you home. They should stay with you overnight as well.    If you don't have someone to stay with you, and you aren't safe to go home alone, we may keep you overnight. Insurance often won't pay for this.  After surgery  If it's hard to control your pain or you need more pain medicine, please call your surgeon's office.  Questions?   If you have any questions for your care team, list them here: _________________________________________________________________________________________________________________________________________________________________________ ____________________________________ ____________________________________ ____________________________________  For informational purposes only. Not to replace the advice of your health care provider. Copyright   2003, 2019 [facility]. All rights reserved. Clinically reviewed by Maggy Melendez MD. [employer] [id number] - REV 07/21.

## 2022-09-09 NOTE — H&P (VIEW-ONLY)
[facility]  [address]  [place] [address]  Phone: [phone]  Fax: [phone]  Primary Provider: Jay Goodwin  Pre-op Performing Provider: MANISH GARCIA    PREOPERATIVE EVALUATION:  Today's date: 9/9/2022    Theo Pozo is a 42 year old male who presents for a preoperative evaluation.    Surgical Information:  Surgery/Procedure: PHACOEMULSIFICATION WITH A STANDARD INTRAOCULAR LENS IMPLANT  Surgery Location: [facility] OR  Surgeon: Quang Woodward  Surgery Date: 09/15 (right),09/29 (left)  Time of Surgery: 10am  Where patient plans to recover: At home alone  Fax number for surgical facility: Note does not need to be faxed, will be available electronically in Epic.    Type of Anesthesia Anticipated: to be determined    Assessment & Plan     The proposed surgical procedure is considered LOW risk.    1. Preop general physical exam: Medically optimized for proposed procedure.  May proceed as planned.  Declined tetanus.  Chronic conditions controlled.  Able to tolerate 4 METS.  Covid testing through surgeon's office.    2. Cataract of both eyes, unspecified cataract type: Plan for procedures above.    3. Moderate persistent asthma without complication: Controlled. ACT of 25. Encourage smoking cessation.  Contemplative phase of change.  Does not require refills on his inhalers.    4. Hyperlipidemia LDL goal <130: Declines lipid screening today.  ASCVD risk would be 5.9% based off of 2019 lipids prior to lipid screening.  Was not on statin for secondary prevention.  Does not have an LDL above 190.  Does not necessarily require treatment today and statin has been removed from his list.  Other risk factors do include smoking.    5. Tobacco abuse disorder: Encourage smoking cessation.  Discussed options.  Contemplative phase of change.    6. Sleep disorder: History of TANG.    7. Pneumococcal vaccination declined    8. COVID-19 vaccination  declined    9. Influenza vaccination declined    10. Tetanus, diphtheria, and acellular pertussis (Tdap) vaccination declined    Risks and Recommendations:  The patient has the following additional risks and recommendations for perioperative complications:   - No identified additional risk factors other than previously addressed    Medication Instructions:  Patient is to take all scheduled medications on the day of surgery    RECOMMENDATION:  APPROVAL GIVEN to proceed with proposed procedure, without further diagnostic evaluation.    Subjective     HPI related to upcoming procedure: bilateral cataracts     Preop Questions 9/9/2022   1. Have you ever had a heart attack or stroke? No   2. Have you ever had surgery on your heart or blood vessels, such as a stent placement, a coronary artery bypass, or surgery on an artery in your head, neck, heart, or legs? No   3. Do you have chest pain with activity? No   4. Do you have a history of  heart failure? No   5. Do you currently have a cold, bronchitis or symptoms of other infection? No   6. Do you have a cough, shortness of breath, or wheezing? No   7. Do you or anyone in your family have previous history of blood clots? No   8. Do you or does anyone in your family have a serious bleeding problem such as prolonged bleeding following surgeries or cuts? No   9. Have you ever had problems with anemia or been told to take iron pills? No   10. Have you had any abnormal blood loss such as black, tarry or bloody stools? No   11. Have you ever had a blood transfusion? No   12. Are you willing to have a blood transfusion if it is medically needed before, during, or after your surgery? yes   13. Have you or any of your relatives ever had problems with anesthesia? No   14. Do you have sleep apnea, excessive snoring or daytime drowsiness? YES - diagnosis of TANG has CPAP not currently using   14a. Do you have a CPAP machine? Yes   15. Do you have any artifical heart valves or other  implanted medical devices like a pacemaker, defibrillator, or continuous glucose monitor? No   16. Do you have artificial joints? No   17. Are you allergic to latex? No     Health Care Directive:  Patient does not have a Health Care Directive or Living Will: Discussed advance care planning with patient; information given to patient to review.  Marylu De La Cruz (sister) [phone]    Preoperative Review of :   reviewed - no record of controlled substances prescribed.    Status of Chronic Conditions:  ASTHMA - Patient has a longstanding history of moderate-severe Asthma . Patient has been doing well overall noting NO SYMPTOMS and continues on inhaled medication regimen without adverse reactions or side effects.     HYPERLIPIDEMIA - Patient has a long history of significant Hyperlipidemia not currently on medication.    Review of Systems  Constitutional, neuro, ENT, endocrine, pulmonary, cardiac, gastrointestinal, genitourinary, musculoskeletal, integument and psychiatric systems are negative, except as otherwise noted.    Patient Active Problem List    Diagnosis Date Noted     Sleep disorder 05/17/2021     Priority: Medium     Nicotine dependence, uncomplicated, unspecified nicotine product type 05/17/2021     Priority: Medium     Hyperlipidemia LDL goal <130 11/07/2019     Priority: Medium     Malaise and fatigue 11/07/2019     Priority: Medium     Snoring 11/07/2019     Priority: Medium     Rib pain 09/19/2018     Priority: Medium     Tobacco abuse disorder 10/31/2017     Priority: Medium     Moderate persistent asthma without complication 08/05/2016     Priority: Medium     Epidermal inclusion cyst 02/14/2012     Priority: Medium      Past Medical History:   Diagnosis Date     Heartburn      Moderate persistent asthma      Tobacco abuse disorder 10/31/2017     Past Surgical History:   Procedure Laterality Date     ARTHROSCOPY KNEE RT/LT      Right knee for meniscal tear     ENDOSCOPY  2005    negatve findings      SURGICAL HISTORY OF -       nasal surgery for epistaxis and obstruction     Current Outpatient Medications   Medication Sig Dispense Refill     fluticasone-salmeterol (ADVAIR) 500-50 MCG/DOSE inhaler Inhale 1 puff into the lungs every 12 hours 60 each 11     albuterol (PROAIR HFA) 108 (90 Base) MCG/ACT inhaler Inhale 2 puffs into the lungs every 6 hours as needed for wheezing (Patient not taking: Reported on 9/9/2022) 1 Inhaler 3     atorvastatin (LIPITOR) 20 MG tablet TAKE 1 TABLET (20 MG) BY MOUTH DAILY (DUE FOR LABS FOR REFILLS) (Patient not taking: No sig reported) 90 tablet 0     ketorolac (ACULAR) 0.5 % ophthalmic solution INSTILL 1 DROP IN SURGICAL EYE THREE TIMES DAILY USE 2 DAYS BEFORE SURGERY AND 14 DAYS AFTER (Patient not taking: Reported on 9/9/2022)       moxifloxacin (VIGAMOX) 0.5 % ophthalmic solution INSTILL 1 DROP INTO SURGICAL EYE THREE TIMES DAILY USE 2 DAYS BEFORE SURGERY AND 14 DAYS AFTER (Patient not taking: Reported on 9/9/2022)       prednisoLONE acetate (PRED FORTE) 1 % ophthalmic suspension INSTILL 1 DROP INTO SURGICAL EYE THREE TIMES DAILY AS DIRECTED FOR 14 DAYS STARTING AFTER SURGERY (Patient not taking: Reported on 9/9/2022)         Allergies   Allergen Reactions     Penicillins      Sulfa Drugs         Social History     Tobacco Use     Smoking status: Current Every Day Smoker     Packs/day: 1.00     Years: 14.00     Pack years: 14.00     Types: Cigarettes     Smokeless tobacco: Former User     Tobacco comment: Advised to quit    Substance Use Topics     Alcohol use: Yes     Alcohol/week: 0.0 standard drinks     Comment: social infrequently     Family History   Problem Relation Age of Onset     Gastrointestinal Disease Mother         Crohn's disease     Colon Cancer No family hx of      Prostate Cancer No family hx of      Thyroid Cancer No family hx of      Diabetes No family hx of      Hypertension No family hx of      Hyperlipidemia No family hx of      History   Drug Use No  "        Objective     /74 (BP Location: Left arm, Patient Position: Sitting, Cuff Size: Adult Regular)   Pulse 83   Temp 97.6  F (36.4  C) (Temporal)   Resp 16   Ht 1.715 m (5' 7.5\")   Wt 100.2 kg (221 lb)   SpO2 96%   BMI 34.10 kg/m      Physical Exam    GENERAL APPEARANCE: healthy, alert and no distress     EYES: EOMI,  PERRL     HENT: ear canals and TM's normal and nose and mouth without ulcers or lesions     NECK: no adenopathy, no asymmetry, masses, or scars and thyroid normal to palpation     RESP: lungs clear to auscultation - no rales, rhonchi or wheezes     CV: regular rates and rhythm, normal S1 S2, no S3 or S4 and no murmur, click or rub     ABDOMEN:  soft, nontender, no HSM or masses and bowel sounds normal     MS: extremities normal- no gross deformities noted, no evidence of inflammation in joints, FROM in all extremities.     SKIN: no suspicious lesions or rashes     NEURO: Normal strength and tone, sensory exam grossly normal, mentation intact and speech normal     PSYCH: mentation appears normal. and affect normal/bright     LYMPHATICS: No cervical adenopathy    Recent Labs   Lab Test 09/03/21  1425 05/17/21  1125   HGB  --  16.9   PLT  --  316    140   POTASSIUM 4.4 4.3   CR 1.05 0.95      Diagnostics:  No labs were ordered during this visit.   No EKG required for low risk surgery (cataract, skin procedure, breast biopsy, etc).    Revised Cardiac Risk Index (RCRI):  The patient has the following serious cardiovascular risks for perioperative complications:   - No serious cardiac risks = 0 points     RCRI Interpretation: 0 points: Class I (very low risk - 0.4% complication rate)       Signed Electronically by: David Boyd MD  Copy of this evaluation report is provided to requesting physician.  "

## 2022-09-14 ENCOUNTER — ANESTHESIA EVENT (OUTPATIENT)
Dept: SURGERY | Facility: CLINIC | Age: 42
End: 2022-09-14
Payer: COMMERCIAL

## 2022-09-14 ASSESSMENT — LIFESTYLE VARIABLES: TOBACCO_USE: 1

## 2022-09-15 ENCOUNTER — ANESTHESIA (OUTPATIENT)
Dept: SURGERY | Facility: CLINIC | Age: 42
End: 2022-09-15
Payer: COMMERCIAL

## 2022-09-15 ENCOUNTER — HOSPITAL ENCOUNTER (OUTPATIENT)
Facility: CLINIC | Age: 42
Discharge: HOME | End: 2022-09-15
Attending: OPHTHALMOLOGY | Admitting: OPHTHALMOLOGY
Payer: COMMERCIAL

## 2022-09-15 VITALS
OXYGEN SATURATION: 94 % | SYSTOLIC BLOOD PRESSURE: 116 MMHG | TEMPERATURE: 98.1 F | DIASTOLIC BLOOD PRESSURE: 84 MMHG | RESPIRATION RATE: 16 BRPM

## 2022-09-15 PROCEDURE — V2632 POST CHMBR INTRAOCULAR LENS: HCPCS | Performed by: OPHTHALMOLOGY

## 2022-09-15 PROCEDURE — 360N000007 HC CATARACT SURGICAL PACKAGE: Performed by: OPHTHALMOLOGY

## 2022-09-15 PROCEDURE — 250N000011 HC RX IP 250 OP 636: Performed by: NURSE ANESTHETIST, CERTIFIED REGISTERED

## 2022-09-15 PROCEDURE — 250N000011 HC RX IP 250 OP 636: Performed by: OPHTHALMOLOGY

## 2022-09-15 PROCEDURE — 250N000009 HC RX 250: Performed by: OPHTHALMOLOGY

## 2022-09-15 PROCEDURE — 370N000004 HC ANESTHESIA CATARACT PACKAGE: Performed by: OPHTHALMOLOGY

## 2022-09-15 PROCEDURE — 761N000008 HC RECOVERY CATRACT PACKAGE: Performed by: OPHTHALMOLOGY

## 2022-09-15 DEVICE — EYE IMP IOL AMO PCL TECNIS ZCB00 20.5: Type: IMPLANTABLE DEVICE | Site: EYE | Status: FUNCTIONAL

## 2022-09-15 RX ORDER — MOXIFLOXACIN 5 MG/ML
1 SOLUTION/ DROPS OPHTHALMIC
Status: COMPLETED | OUTPATIENT
Start: 2022-09-15 | End: 2022-09-15

## 2022-09-15 RX ORDER — TROPICAMIDE 10 MG/ML
1 SOLUTION/ DROPS OPHTHALMIC
Status: COMPLETED | OUTPATIENT
Start: 2022-09-15 | End: 2022-09-15

## 2022-09-15 RX ORDER — PREDNISOLONE ACETATE 1 %
SUSPENSION, DROPS(FINAL DOSAGE FORM)(ML) OPHTHALMIC (EYE) PRN
Status: DISCONTINUED | OUTPATIENT
Start: 2022-09-15 | End: 2022-09-15 | Stop reason: HOSPADM

## 2022-09-15 RX ORDER — PROPARACAINE HYDROCHLORIDE 5 MG/ML
1 SOLUTION/ DROPS OPHTHALMIC ONCE
Status: COMPLETED | OUTPATIENT
Start: 2022-09-15 | End: 2022-09-15

## 2022-09-15 RX ORDER — PHENYLEPHRINE HYDROCHLORIDE 25 MG/ML
1 SOLUTION/ DROPS OPHTHALMIC
Status: COMPLETED | OUTPATIENT
Start: 2022-09-15 | End: 2022-09-15

## 2022-09-15 RX ORDER — FENTANYL CITRATE 50 UG/ML
INJECTION, SOLUTION INTRAMUSCULAR; INTRAVENOUS PRN
Status: DISCONTINUED | OUTPATIENT
Start: 2022-09-15 | End: 2022-09-15

## 2022-09-15 RX ADMIN — PROPARACAINE HYDROCHLORIDE 1 DROP: 5 SOLUTION/ DROPS OPHTHALMIC at 08:40

## 2022-09-15 RX ADMIN — MOXIFLOXACIN 1 DROP: 5 SOLUTION/ DROPS OPHTHALMIC at 08:51

## 2022-09-15 RX ADMIN — FENTANYL CITRATE 25 MCG: 50 INJECTION, SOLUTION INTRAMUSCULAR; INTRAVENOUS at 09:56

## 2022-09-15 RX ADMIN — MOXIFLOXACIN 1 DROP: 5 SOLUTION/ DROPS OPHTHALMIC at 08:42

## 2022-09-15 RX ADMIN — MOXIFLOXACIN 1 DROP: 5 SOLUTION/ DROPS OPHTHALMIC at 09:01

## 2022-09-15 RX ADMIN — TROPICAMIDE 1 DROP: 10 SOLUTION/ DROPS OPHTHALMIC at 08:51

## 2022-09-15 RX ADMIN — TROPICAMIDE 1 DROP: 10 SOLUTION/ DROPS OPHTHALMIC at 09:00

## 2022-09-15 RX ADMIN — FENTANYL CITRATE 25 MCG: 50 INJECTION, SOLUTION INTRAMUSCULAR; INTRAVENOUS at 09:52

## 2022-09-15 RX ADMIN — PHENYLEPHRINE HYDROCHLORIDE 1 DROP: 25 SOLUTION/ DROPS OPHTHALMIC at 08:52

## 2022-09-15 RX ADMIN — MIDAZOLAM 2 MG: 1 INJECTION INTRAMUSCULAR; INTRAVENOUS at 09:50

## 2022-09-15 RX ADMIN — PHENYLEPHRINE HYDROCHLORIDE 1 DROP: 25 SOLUTION/ DROPS OPHTHALMIC at 08:43

## 2022-09-15 RX ADMIN — PROPARACAINE HYDROCHLORIDE 1 DROP: 5 SOLUTION/ DROPS OPHTHALMIC at 08:57

## 2022-09-15 RX ADMIN — TROPICAMIDE 1 DROP: 10 SOLUTION/ DROPS OPHTHALMIC at 08:41

## 2022-09-15 RX ADMIN — PHENYLEPHRINE HYDROCHLORIDE 1 DROP: 25 SOLUTION/ DROPS OPHTHALMIC at 09:01

## 2022-09-15 ASSESSMENT — ACTIVITIES OF DAILY LIVING (ADL): ADLS_ACUITY_SCORE: 35

## 2022-09-15 NOTE — DISCHARGE INSTRUCTIONS
Discharge Instructions Following Cataract Surgery  Shashank Woodward and Mary    Date: 9/15/2022    EYE MEDICATIONS:  You should start drops immediately when arrive home.      Vigamox - 1 drop 4 times a day to operative eye.   Econopred Plus - 1 drop 4 times a day to operative eye.  Ketorolac - 1 drop 4 times a day to operative eye.  Put only ordered eye drops into the operative eye.  If you have glaucoma, continue your usual drops, unless directed otherwise.    For 3 days: Wear your glasses or leave the eye uncovered while awake.    Wear the eye shield every night and during daytime naps.  Use no padding under eye shield.    You may notice blurred or double vision initially, this will gradually clear.     Report any severe eye pain.    Minor itching, scratching, burning etc. is normal.  Use regular or extra-strength Tylenol for discomfort.    Refrain from heavy lifting, excessive bending over, and heavy exertion for 1 week.    You may bathe or shower but do not get the eye wet.    Do not rub or push on the operative eye for 1 week.  You may wipe the lids gently with a warm wet cloth to remove matter from eyelashes.    Continue with your usual diet and medications prescribed by your doctor.    Sunglasses will increase your comfort post-operatively.    Post Operative Visit   Bring all material and medications to the office on the first post-op day.  Call your surgeon at [phone] or the answering service at [phone] for any problems, especially pain.

## 2022-09-15 NOTE — ANESTHESIA PREPROCEDURE EVALUATION
Anesthesia Pre-Procedure Evaluation    Patient: Theo Pozo   MRN: [id number] : 1980        Procedure : Procedure(s):  PHACOEMULSIFICATION WITH A STANDARD INTRAOCULAR LENS IMPLANT          Past Medical History:   Diagnosis Date     Heartburn      Moderate persistent asthma      Tobacco abuse disorder 10/31/2017      Past Surgical History:   Procedure Laterality Date     ARTHROSCOPY KNEE RT/LT      Right knee for meniscal tear     ENDOSCOPY      negatve findings     SURGICAL HISTORY OF -       nasal surgery for epistaxis and obstruction      Allergies   Allergen Reactions     Penicillins      Sulfa Drugs       Social History     Tobacco Use     Smoking status: Current Every Day Smoker     Packs/day: 1.00     Years: 14.00     Pack years: 14.00     Types: Cigarettes     Smokeless tobacco: Former User     Tobacco comment: Advised to quit    Substance Use Topics     Alcohol use: Yes     Alcohol/week: 0.0 standard drinks     Comment: social infrequently      Wt Readings from Last 1 Encounters:   22 100.2 kg (221 lb)        Anesthesia Evaluation   Pt has had prior anesthetic. Type: General and MAC.        ROS/MED HX  ENT/Pulmonary:     (+) TANG risk factors, snores loudly, obese, daytime somnolence, tobacco use, Current use, Mild Persistent, asthma Treatment: Inhaler prn,      Neurologic:  - neg neurologic ROS     Cardiovascular:  - neg cardiovascular ROS   (+) -----Previous cardiac testing   Echo: Date: Results:    Stress Test: Date: Results:    ECG Reviewed: Date: 2019 Results:  SR  Cath: Date: Results:      METS/Exercise Tolerance:     Hematologic:  - neg hematologic  ROS     Musculoskeletal: Comment: Malaise and fatigue      GI/Hepatic:  - neg GI/hepatic ROS     Renal/Genitourinary:  - neg Renal ROS     Endo:     (+) Obesity,     Psychiatric/Substance Use:  - neg psychiatric ROS     Infectious Disease:  - neg infectious disease ROS     Malignancy:  - neg malignancy ROS     Other:  - neg other  ROS          Physical Exam    Airway  airway exam normal      Mallampati: II   TM distance: > 3 FB   Neck ROM: full   Mouth opening: > 3 cm    Respiratory Devices and Support         Dental  no notable dental history         Cardiovascular   cardiovascular exam normal       Rhythm and rate: regular and normal     Pulmonary   pulmonary exam normal        breath sounds clear to auscultation           OUTSIDE LABS:  CBC:   Lab Results   Component Value Date    WBC 10.5 05/17/2021    WBC 11.0 11/20/2019    HGB 16.9 05/17/2021    HGB 15.6 11/20/2019    HCT 50.4 05/17/2021    HCT 46.9 11/20/2019     05/17/2021     11/20/2019     BMP:   Lab Results   Component Value Date     09/03/2021     05/17/2021    POTASSIUM 4.4 09/03/2021    POTASSIUM 4.3 05/17/2021    CHLORIDE 109 09/03/2021    CHLORIDE 110 (H) 05/17/2021    CO2 29 09/03/2021    CO2 29 05/17/2021    BUN 14 09/03/2021    BUN 12 05/17/2021    CR 1.05 09/03/2021    CR 0.95 05/17/2021    GLC 97 09/03/2021    GLC 91 05/17/2021     COAGS: No results found for: PTT, INR, FIBR  POC: No results found for: BGM, HCG, HCGS  HEPATIC:   Lab Results   Component Value Date    ALBUMIN 3.8 05/17/2021    PROTTOTAL 6.9 05/17/2021    ALT 38 05/17/2021    AST 17 05/17/2021    ALKPHOS 103 05/17/2021    BILITOTAL 0.6 05/17/2021     OTHER:   Lab Results   Component Value Date    A1C 5.3 08/17/2011    AUREA 9.5 09/03/2021    LIPASE 231 11/20/2019    TSH 1.66 11/07/2019       Anesthesia Plan    ASA Status:  2   NPO Status:  NPO Appropriate    Anesthesia Type: MAC.     - Reason for MAC: straight local not clinically adequate   Induction: Intravenous.   Maintenance: TIVA.        Consents    Anesthesia Plan(s) and associated risks, benefits, and realistic alternatives discussed. Questions answered and patient/representative(s) expressed understanding.    - Discussed:     - Discussed with:  Patient      - Extended Intubation/Ventilatory Support Discussed: No.      -  Patient is DNR/DNI Status: No    Use of blood products discussed: No .     Postoperative Care    Pain management: Oral pain medications.        Comments:    Other Comments: The risks and benefits of anesthesia, and the alternatives where applicable, have been discussed with the patient, and they wish to proceed.            SHAHAB Mata CRNA

## 2022-09-15 NOTE — ANESTHESIA POSTPROCEDURE EVALUATION
Patient: Theo Pozo    Procedure: Procedure(s):  PHACOEMULSIFICATION WITH A STANDARD INTRAOCULAR LENS IMPLANT       Anesthesia Type:  MAC    Note:  Disposition: Outpatient   Postop Pain Control: Uneventful            Sign Out: Well controlled pain   PONV: No   Neuro/Psych: Uneventful            Sign Out: Acceptable/Baseline neuro status   Airway/Respiratory: Uneventful            Sign Out: Acceptable/Baseline resp. status   CV/Hemodynamics: Uneventful            Sign Out: Acceptable CV status   Other NRE: NONE   DID A NON-ROUTINE EVENT OCCUR? No    Event details/Postop Comments:  Pt was happy with anesthesia care.  No complications.  I will follow up with the pt if needed.           Last vitals:  Vitals Value Taken Time   /84 09/15/22 1017   Temp     Pulse     Resp 16 09/15/22 1017   SpO2 94 % 09/15/22 1017       Electronically Signed By: SHAHAB Mata CRNA  September 15, 2022  10:26 AM

## 2022-09-15 NOTE — ANESTHESIA CARE TRANSFER NOTE
Patient: Theo Pozo    Procedure: Procedure(s):  PHACOEMULSIFICATION WITH A STANDARD INTRAOCULAR LENS IMPLANT       Diagnosis: Nuclear sclerotic cataract of right eye [H25.11]  Diagnosis Additional Information: No value filed.    Anesthesia Type:   MAC     Note:    Oropharynx: oropharynx clear of all foreign objects and spontaneously breathing  Level of Consciousness: drowsy  Oxygen Supplementation: room air    Independent Airway: airway patency satisfactory and stable  Dentition: dentition unchanged  Vital Signs Stable: post-procedure vital signs reviewed and stable  Report to RN Given: handoff report given  Patient transferred to: Phase II    Handoff Report: Identifed the Patient, Identified the Reponsible Provider, Reviewed the pertinent medical history, Discussed the surgical course, Reviewed Intra-OP anesthesia mangement and issues during anesthesia, Set expectations for post-procedure period and Allowed opportunity for questions and acknowledgement of understanding      Vitals:  Vitals Value Taken Time   /84 09/15/22 1017   Temp     Pulse     Resp 16 09/15/22 1017   SpO2 94 % 09/15/22 1017       Electronically Signed By: SHAHAB Mata CRNA  September 15, 2022  10:25 AM

## 2022-09-15 NOTE — OP NOTE
PREOPERATIVE DIAGNOSIS: Cataract, Right eye.   POSTOPERATIVE DIAGNOSIS: Cataract, Right eye.   OPERATION: Phacoemulsification with implantation of posterior chamber intraocular lens, Right eye.   ANESTHESIA: Monitored anesthesia care.   INDICATIONS FOR SURGERY: Theo Pozo has noted a progressive decline in the vision of his Right eye secondary to a cataract. This has affected his ability to perform routine functions including reading. The patient has progressive cataract changes consistent with his vision and symptoms.     PROCEDURE: Informed consent was obtained from the patient preoperatively with the risks and alternatives reviewed, including the possibility of loss of vision. In the preoperative area, the patient was administered topical anesthetic consisting of 2% Xylocaine jelly. The patient was taken to the operating room. The face was prepped and draped in the usual sterile fashion. Attention was directed to the Right eye. A stab incision was made at the limbus with a 15-degree blade. Viscoat was used to replace aqueous. A keratome was used to make a limbal self-sealing incision 2.5 mm in diameter. A curvilinear capsulorrhexis was performed with the Utrata forceps. Hydrodissection was carried out. The nucleus was removed with the phacoemulsification handpiece in a four-quadrant cracking technique. The cortex was removed with the irrigation and aspiration handpiece. The posterior capsule remained intact. Provisc was used to inflate the capsular bag. A posterior chamber intraocular lens was taken from its case and inspected. It was free of defects and it was folded into the shooter. The lens was then injected into the eye by directing the leading haptic into the capsular bag. The trailing haptic was then placed in the eye with a haptic . The lens centered well. The Provisc was removed from the eye with the I&A handpiece. The eye was inflated with balanced salt solution. The wound was inspected and  found to be watertight. Topical Vigamox and Pred Forte were applied. An eye shield was placed over the eye. The patient tolerated the procedure well and left the operating area in good condition.     Implant Name Type Inv. Item Serial No.  Lot No. LRB No. Used Action   EYE IMP IOL URBANO PCL TECNIS ZCB00 20.5 - [id number] Lens/Eye Implant EYE IMP IOL URBANO PCL TECNIS ZCB00 20.5 [id number] [employer]  Right 1 Implanted       Quang Woodward M.D.

## 2022-09-15 NOTE — INTERVAL H&P NOTE
"I have reviewed the surgical (or preoperative) H&P that is linked to this encounter, and examined the patient. There are no significant changes    Clinical Conditions Present on Arrival:  Clinically Significant Risk Factors Present on Admission                   # Obesity: Estimated body mass index is 34.1 kg/m  as calculated from the following:    Height as of 9/9/22: 1.715 m (5' 7.5\").    Weight as of 9/9/22: 100.2 kg (221 lb).       "

## 2022-09-29 ENCOUNTER — ANESTHESIA (OUTPATIENT)
Dept: SURGERY | Facility: CLINIC | Age: 42
End: 2022-09-29
Payer: COMMERCIAL

## 2022-09-29 ENCOUNTER — HOSPITAL ENCOUNTER (OUTPATIENT)
Facility: CLINIC | Age: 42
Discharge: HOME | End: 2022-09-29
Attending: STUDENT IN AN ORGANIZED HEALTH CARE EDUCATION/TRAINING PROGRAM | Admitting: STUDENT IN AN ORGANIZED HEALTH CARE EDUCATION/TRAINING PROGRAM
Payer: COMMERCIAL

## 2022-09-29 ENCOUNTER — ANESTHESIA EVENT (OUTPATIENT)
Dept: SURGERY | Facility: CLINIC | Age: 42
End: 2022-09-29
Payer: COMMERCIAL

## 2022-09-29 VITALS
OXYGEN SATURATION: 94 % | HEART RATE: 75 BPM | TEMPERATURE: 98.1 F | SYSTOLIC BLOOD PRESSURE: 133 MMHG | RESPIRATION RATE: 16 BRPM | DIASTOLIC BLOOD PRESSURE: 87 MMHG

## 2022-09-29 PROCEDURE — 250N000011 HC RX IP 250 OP 636: Performed by: NURSE ANESTHETIST, CERTIFIED REGISTERED

## 2022-09-29 PROCEDURE — 360N000007 HC CATARACT SURGICAL PACKAGE: Performed by: STUDENT IN AN ORGANIZED HEALTH CARE EDUCATION/TRAINING PROGRAM

## 2022-09-29 PROCEDURE — 761N000008 HC RECOVERY CATRACT PACKAGE: Performed by: STUDENT IN AN ORGANIZED HEALTH CARE EDUCATION/TRAINING PROGRAM

## 2022-09-29 PROCEDURE — 250N000009 HC RX 250: Performed by: STUDENT IN AN ORGANIZED HEALTH CARE EDUCATION/TRAINING PROGRAM

## 2022-09-29 PROCEDURE — 370N000004 HC ANESTHESIA CATARACT PACKAGE: Performed by: STUDENT IN AN ORGANIZED HEALTH CARE EDUCATION/TRAINING PROGRAM

## 2022-09-29 PROCEDURE — 250N000011 HC RX IP 250 OP 636: Performed by: STUDENT IN AN ORGANIZED HEALTH CARE EDUCATION/TRAINING PROGRAM

## 2022-09-29 PROCEDURE — V2632 POST CHMBR INTRAOCULAR LENS: HCPCS | Performed by: STUDENT IN AN ORGANIZED HEALTH CARE EDUCATION/TRAINING PROGRAM

## 2022-09-29 DEVICE — EYE IMP IOL AMO PCL TECNIS ZCB00 21.5: Type: IMPLANTABLE DEVICE | Site: EYE | Status: FUNCTIONAL

## 2022-09-29 RX ORDER — PHENYLEPHRINE HYDROCHLORIDE 25 MG/ML
1 SOLUTION/ DROPS OPHTHALMIC
Status: COMPLETED | OUTPATIENT
Start: 2022-09-29 | End: 2022-09-29

## 2022-09-29 RX ORDER — MOXIFLOXACIN 5 MG/ML
1 SOLUTION/ DROPS OPHTHALMIC
Status: COMPLETED | OUTPATIENT
Start: 2022-09-29 | End: 2022-09-29

## 2022-09-29 RX ORDER — DICLOFENAC SODIUM 1 MG/ML
1 SOLUTION/ DROPS OPHTHALMIC
Status: COMPLETED | OUTPATIENT
Start: 2022-09-29 | End: 2022-09-29

## 2022-09-29 RX ORDER — PROPARACAINE HYDROCHLORIDE 5 MG/ML
1 SOLUTION/ DROPS OPHTHALMIC ONCE
Status: DISCONTINUED | OUTPATIENT
Start: 2022-09-29 | End: 2022-09-29 | Stop reason: HOSPADM

## 2022-09-29 RX ORDER — PREDNISOLONE ACETATE 10 MG/ML
SUSPENSION/ DROPS OPHTHALMIC PRN
Status: DISCONTINUED | OUTPATIENT
Start: 2022-09-29 | End: 2022-09-29 | Stop reason: HOSPADM

## 2022-09-29 RX ORDER — ONDANSETRON 2 MG/ML
4 INJECTION INTRAMUSCULAR; INTRAVENOUS EVERY 30 MIN PRN
Status: DISCONTINUED | OUTPATIENT
Start: 2022-09-29 | End: 2022-09-29 | Stop reason: HOSPADM

## 2022-09-29 RX ORDER — FENTANYL CITRATE 50 UG/ML
INJECTION, SOLUTION INTRAMUSCULAR; INTRAVENOUS PRN
Status: DISCONTINUED | OUTPATIENT
Start: 2022-09-29 | End: 2022-09-29

## 2022-09-29 RX ORDER — MOXIFLOXACIN 5 MG/ML
SOLUTION/ DROPS OPHTHALMIC PRN
Status: DISCONTINUED | OUTPATIENT
Start: 2022-09-29 | End: 2022-09-29 | Stop reason: HOSPADM

## 2022-09-29 RX ORDER — PROPARACAINE HYDROCHLORIDE 5 MG/ML
1 SOLUTION/ DROPS OPHTHALMIC ONCE
Status: COMPLETED | OUTPATIENT
Start: 2022-09-29 | End: 2022-09-29

## 2022-09-29 RX ORDER — LIDOCAINE 40 MG/G
CREAM TOPICAL
Status: DISCONTINUED | OUTPATIENT
Start: 2022-09-29 | End: 2022-09-29 | Stop reason: HOSPADM

## 2022-09-29 RX ORDER — ONDANSETRON 4 MG/1
4 TABLET, ORALLY DISINTEGRATING ORAL EVERY 30 MIN PRN
Status: DISCONTINUED | OUTPATIENT
Start: 2022-09-29 | End: 2022-09-29 | Stop reason: HOSPADM

## 2022-09-29 RX ORDER — CYCLOPENTOLATE HYDROCHLORIDE 10 MG/ML
1 SOLUTION/ DROPS OPHTHALMIC
Status: COMPLETED | OUTPATIENT
Start: 2022-09-29 | End: 2022-09-29

## 2022-09-29 RX ADMIN — PHENYLEPHRINE HYDROCHLORIDE 1 DROP: 25 SOLUTION/ DROPS OPHTHALMIC at 08:23

## 2022-09-29 RX ADMIN — CYCLOPENTOLATE HYDROCHLORIDE 1 DROP: 10 SOLUTION/ DROPS OPHTHALMIC at 08:20

## 2022-09-29 RX ADMIN — MIDAZOLAM 2 MG: 1 INJECTION INTRAMUSCULAR; INTRAVENOUS at 09:06

## 2022-09-29 RX ADMIN — CYCLOPENTOLATE HYDROCHLORIDE 1 DROP: 10 SOLUTION/ DROPS OPHTHALMIC at 08:25

## 2022-09-29 RX ADMIN — PHENYLEPHRINE HYDROCHLORIDE 1 DROP: 25 SOLUTION/ DROPS OPHTHALMIC at 08:28

## 2022-09-29 RX ADMIN — MOXIFLOXACIN OPHTHALMIC SOLUTION 1 DROP: 5 SOLUTION/ DROPS OPHTHALMIC at 08:22

## 2022-09-29 RX ADMIN — PHENYLEPHRINE HYDROCHLORIDE 1 DROP: 25 SOLUTION/ DROPS OPHTHALMIC at 08:33

## 2022-09-29 RX ADMIN — DICLOFENAC SODIUM 1 DROP: 1 SOLUTION OPHTHALMIC at 08:21

## 2022-09-29 RX ADMIN — DICLOFENAC SODIUM 1 DROP: 1 SOLUTION OPHTHALMIC at 08:32

## 2022-09-29 RX ADMIN — MOXIFLOXACIN OPHTHALMIC SOLUTION 1 DROP: 5 SOLUTION/ DROPS OPHTHALMIC at 08:33

## 2022-09-29 RX ADMIN — MOXIFLOXACIN OPHTHALMIC SOLUTION 1 DROP: 5 SOLUTION/ DROPS OPHTHALMIC at 08:27

## 2022-09-29 RX ADMIN — PROPARACAINE HYDROCHLORIDE 1 DROP: 5 SOLUTION/ DROPS OPHTHALMIC at 08:17

## 2022-09-29 RX ADMIN — FENTANYL CITRATE 100 MCG: 50 INJECTION, SOLUTION INTRAMUSCULAR; INTRAVENOUS at 09:06

## 2022-09-29 RX ADMIN — DICLOFENAC SODIUM 1 DROP: 1 SOLUTION OPHTHALMIC at 08:26

## 2022-09-29 RX ADMIN — CYCLOPENTOLATE HYDROCHLORIDE 1 DROP: 10 SOLUTION/ DROPS OPHTHALMIC at 08:30

## 2022-09-29 ASSESSMENT — LIFESTYLE VARIABLES: TOBACCO_USE: 1

## 2022-09-29 ASSESSMENT — ACTIVITIES OF DAILY LIVING (ADL): ADLS_ACUITY_SCORE: 35

## 2022-09-29 NOTE — ANESTHESIA CARE TRANSFER NOTE
Patient: Theo Pozo    Procedure: Procedure(s):  PHACOEMULSIFICATION WITH A STANDARD INTRAOCULAR LENS IMPLANT       Diagnosis: Nuclear sclerotic cataract of left eye [H25.12]  Diagnosis Additional Information: No value filed.    Anesthesia Type:   MAC     Note:    Oropharynx: oropharynx clear of all foreign objects and spontaneously breathing  Level of Consciousness: awake  Oxygen Supplementation: room air    Independent Airway: airway patency satisfactory and stable  Dentition: dentition unchanged  Vital Signs Stable: post-procedure vital signs reviewed and stable  Report to RN Given: handoff report given  Patient transferred to: Phase II    Handoff Report: Identifed the Patient, Identified the Reponsible Provider, Reviewed the pertinent medical history, Discussed the surgical course, Reviewed Intra-OP anesthesia mangement and issues during anesthesia, Set expectations for post-procedure period and Allowed opportunity for questions and acknowledgement of understanding      Vitals:  Vitals Value Taken Time   BP     Temp     Pulse     Resp     SpO2 94 % 09/29/22 0938   Vitals shown include unvalidated device data.    Electronically Signed By: SHAHAB Benitez CRNA  September 29, 2022  9:40 AM

## 2022-09-29 NOTE — ANESTHESIA PREPROCEDURE EVALUATION
Anesthesia Pre-Procedure Evaluation    Patient: Theo Pozo   MRN: [id number] : 1980        Procedure : Procedure(s):  PHACOEMULSIFICATION WITH A STANDARD INTRAOCULAR LENS IMPLANT          Past Medical History:   Diagnosis Date     Heartburn      Moderate persistent asthma      Tobacco abuse disorder 10/31/2017      Past Surgical History:   Procedure Laterality Date     ARTHROSCOPY KNEE RT/LT      Right knee for meniscal tear     ENDOSCOPY      negatve findings     PHACOEMULSIFICATION WITH STANDARD INTRAOCULAR LENS IMPLANT Right 9/15/2022    Procedure: PHACOEMULSIFICATION WITH A STANDARD INTRAOCULAR LENS IMPLANT, Right;  Surgeon: Quang Woodward MD;  Location: [facility]     SURGICAL HISTORY OF -       nasal surgery for epistaxis and obstruction      Allergies   Allergen Reactions     Penicillins      Sulfa Drugs       Social History     Tobacco Use     Smoking status: Current Every Day Smoker     Packs/day: 1.00     Years: 14.00     Pack years: 14.00     Types: Cigarettes     Smokeless tobacco: Former User     Tobacco comment: Advised to quit    Substance Use Topics     Alcohol use: Yes     Alcohol/week: 0.0 standard drinks     Comment: social infrequently      Wt Readings from Last 1 Encounters:   22 100.2 kg (221 lb)        Anesthesia Evaluation   Pt has had prior anesthetic. Type: General and MAC.        ROS/MED HX  ENT/Pulmonary:     (+) sleep apnea, doesn't use CPAP, TANG risk factors, snores loudly, obese, daytime somnolence, tobacco use, Current use, Mild Persistent, asthma Treatment: Inhaler prn,      Neurologic:  - neg neurologic ROS     Cardiovascular:  - neg cardiovascular ROS   (+) -----Previous cardiac testing   Echo: Date: Results:    Stress Test: Date: Results:    ECG Reviewed: Date: 2019 Results:  SR  Cath: Date: Results:      METS/Exercise Tolerance:     Hematologic:  - neg hematologic  ROS     Musculoskeletal: Comment: Malaise and fatigue      GI/Hepatic:  - neg  GI/hepatic ROS  (-) GERD   Renal/Genitourinary:  - neg Renal ROS     Endo:     (+) Obesity,     Psychiatric/Substance Use:  - neg psychiatric ROS     Infectious Disease:  - neg infectious disease ROS     Malignancy:  - neg malignancy ROS     Other:  - neg other ROS          Physical Exam    Airway  airway exam normal      Mallampati: II   TM distance: > 3 FB   Neck ROM: full   Mouth opening: > 3 cm    Respiratory Devices and Support         Dental  no notable dental history         Cardiovascular   cardiovascular exam normal       Rhythm and rate: regular and normal     Pulmonary   pulmonary exam normal        breath sounds clear to auscultation           OUTSIDE LABS:  CBC:   Lab Results   Component Value Date    WBC 10.5 05/17/2021    WBC 11.0 11/20/2019    HGB 16.9 05/17/2021    HGB 15.6 11/20/2019    HCT 50.4 05/17/2021    HCT 46.9 11/20/2019     05/17/2021     11/20/2019     BMP:   Lab Results   Component Value Date     09/03/2021     05/17/2021    POTASSIUM 4.4 09/03/2021    POTASSIUM 4.3 05/17/2021    CHLORIDE 109 09/03/2021    CHLORIDE 110 (H) 05/17/2021    CO2 29 09/03/2021    CO2 29 05/17/2021    BUN 14 09/03/2021    BUN 12 05/17/2021    CR 1.05 09/03/2021    CR 0.95 05/17/2021    GLC 97 09/03/2021    GLC 91 05/17/2021     COAGS: No results found for: PTT, INR, FIBR  POC: No results found for: BGM, HCG, HCGS  HEPATIC:   Lab Results   Component Value Date    ALBUMIN 3.8 05/17/2021    PROTTOTAL 6.9 05/17/2021    ALT 38 05/17/2021    AST 17 05/17/2021    ALKPHOS 103 05/17/2021    BILITOTAL 0.6 05/17/2021     OTHER:   Lab Results   Component Value Date    A1C 5.3 08/17/2011    AUREA 9.5 09/03/2021    LIPASE 231 11/20/2019    TSH 1.66 11/07/2019       Anesthesia Plan    ASA Status:  2   NPO Status:  NPO Appropriate    Anesthesia Type: MAC.     - Reason for MAC: straight local not clinically adequate, immobility needed   Induction: Intravenous.   Maintenance: TIVA.         Consents    Anesthesia Plan(s) and associated risks, benefits, and realistic alternatives discussed. Questions answered and patient/representative(s) expressed understanding.    - Discussed:     - Discussed with:  Patient      - Extended Intubation/Ventilatory Support Discussed: No.      - Patient is DNR/DNI Status: No    Use of blood products discussed: No .     Postoperative Care    Pain management: IV analgesics.        Comments:    Other Comments: The risks and benefits of anesthesia, and the alternatives where applicable, have been discussed with the patient, and they wish to proceed.                SHAHAB Benitez CRNA

## 2022-09-29 NOTE — BRIEF OP NOTE
[facility]    Brief Operative Note    Pre-operative diagnosis: Posterior subcapsular cataract of left eye [H25.12]  Post-operative diagnosis Same as pre-operative diagnosis    Procedure: Procedure(s):  PHACOEMULSIFICATION WITH A STANDARD INTRAOCULAR LENS IMPLANT, LEFT Eye  Surgeon: Surgeon(s) and Role:     * Cesar Burgos MD - Primary  Anesthesia: MAC with Topical   Estimated Blood Loss: 0 mL from 9/29/2022  9:08 AM to 9/29/2022  9:31 AM      Drains: None  Specimens: * No specimens in log *  Findings:   None.  Complications: None.  Implants:   Implant Name Type Inv. Item Serial No.  Lot No. LRB No. Used Action   EYE IMP IOL URBANO PCL TECNIS ZCB00 21.5 - [id number] Lens/Eye Implant EYE IMP IOL URBANO PCL TECNIS ZCB00 21.5 [id number] [employer]  Left 1 Implanted

## 2022-09-29 NOTE — OP NOTE
[facility]  Ophthalmology Operative Note    PREOPERATIVE DIAGNOSIS: Cataract, Left eye.     POSTOPERATIVE DIAGNOSIS: Cataract, Left eye.     OPERATION: Cataract extraction with placement of posterior chamber intraocular lens in the Left eye.     ANESTHESIA: MAC combined with topical     INDICATIONS FOR PROCEDURE: Theo Pozo was seen in the [facility] for decreased visual acuity in the Left eye. The patient was found to have a visually significant cataract in the Left eye. The risks, benefits, alternatives and goals of cataract extraction were discussed with the patient, and after adequate discussion the patient understood and agreed to these, and a signed informed consent was obtained prior to the procedure.     DESCRIPTION OF PROCEDURE: After proper patient identification, topical anesthesia was applied to the Left eye. The patient was brought to the operating room and the Left eye was prepped and draped in the usual sterile fashion for intraocular surgery. A lid speculum was placed in the Left eye. A paracentesis was then created and the anterior chamber was filled with 1% non-preserved lidocaine followed by Endocoat. A clear corneal incision was then created temporally using a 2.4mm keratome. A continuous curvilinear capsulorrhexis was then created using a cystotome and Utrata forceps. Hydrodissection was carried out with BSS on a Keller cannula. The soft lens was removed using the I&A handpiece along with any residual cortical material. The posterior capsule was polished carefully with the I&A handpiece. The capsular bag was then filled with Provisc and a ZCB00 +21.5 diopter intraocular lens was then injected into the capsular bag. The lens showed good centration and stability. Residual viscoelastic was removed using the I&A handpiece. The wound was then hydrated and the anterior chamber reformed. Intracameral Moxifloxacin was then injected into the anterior  chamber. The wounds were then checked and found to be sealed. Topical Prednisolone drops were placed in the patient's Left eye followed by a Gastelum shield over the top of this. The patient tolerated the procedure well without complications and was told to follow up in the clinic in the next postoperative day.     Implant Name Type Inv. Item Serial No.  Lot No. LRB No. Used Action   EYE IMP IOL URBANO PCL TECNIS ZCB00 21.5 - [id number] Lens/Eye Implant EYE IMP IOL URBANO PCL TECNIS ZCB00 21.5 [id number] [employer]  Left 1 Implanted        Cesar Burgos MD

## 2022-09-29 NOTE — ANESTHESIA POSTPROCEDURE EVALUATION
Patient: Theo Pozo    Procedure: Procedure(s):  PHACOEMULSIFICATION WITH A STANDARD INTRAOCULAR LENS IMPLANT       Anesthesia Type:  MAC    Note:  Disposition: Outpatient   Postop Pain Control: Uneventful            Sign Out: Well controlled pain   PONV: No   Neuro/Psych: Uneventful            Sign Out: Acceptable/Baseline neuro status   Airway/Respiratory: Uneventful            Sign Out: Acceptable/Baseline resp. status   CV/Hemodynamics: Uneventful            Sign Out: Acceptable CV status   Other NRE: NONE   DID A NON-ROUTINE EVENT OCCUR? No    Event details/Postop Comments:  Pt was happy with anesthesia care.  No complications.  I will follow up with the pt if needed.           Last vitals:  Vitals Value Taken Time   BP     Temp     Pulse     Resp     SpO2 94 % 09/29/22 0938   Vitals shown include unvalidated device data.    Electronically Signed By: SHAHAB Benitez CRNA  September 29, 2022  9:40 AM

## 2022-11-14 NOTE — TELEPHONE ENCOUNTER
Panel Management Review      Patient has the following on his problem list:     Asthma review     ACT Total Scores 10/31/2017   ACT TOTAL SCORE -   ASTHMA ER VISITS -   ASTHMA HOSPITALIZATIONS -   ACT TOTAL SCORE (Goal Greater than or Equal to 20) 23   In the past 12 months, how many times did you visit the emergency room for your asthma without being admitted to the hospital? 0   In the past 12 months, how many times were you hospitalized overnight because of your asthma? 0      1. Is Asthma diagnosis on the Problem List? Yes , No   2. Is Asthma listed on Health Maintenance? Yes    3. Patient is due for:  none        Composite cancer screening  Chart review shows that this patient is due/due soon for the following None  Summary:    Patient is due/failing the following:   none    Action needed:   none    Type of outreach:    none    Questions for provider review:    None                                                                                                                                    Stacey Felix CMA       Chart routed to closed .        
Allergy;

## 2022-11-18 DIAGNOSIS — J45.40 MODERATE PERSISTENT ASTHMA WITHOUT COMPLICATION: ICD-10-CM

## 2022-11-18 RX ORDER — FLUTICASONE PROPIONATE AND SALMETEROL 500; 50 UG/1; UG/1
POWDER RESPIRATORY (INHALATION)
OUTPATIENT
Start: 2022-11-18

## 2022-11-19 ENCOUNTER — HEALTH MAINTENANCE LETTER (OUTPATIENT)
Age: 42
End: 2022-11-19

## 2022-12-01 ENCOUNTER — OFFICE VISIT (OUTPATIENT)
Dept: FAMILY MEDICINE | Facility: OTHER | Age: 42
End: 2022-12-01
Payer: COMMERCIAL

## 2022-12-01 VITALS
SYSTOLIC BLOOD PRESSURE: 110 MMHG | RESPIRATION RATE: 16 BRPM | DIASTOLIC BLOOD PRESSURE: 76 MMHG | HEART RATE: 103 BPM | OXYGEN SATURATION: 94 % | WEIGHT: 218.5 LBS | HEIGHT: 68 IN | BODY MASS INDEX: 33.12 KG/M2 | TEMPERATURE: 97.1 F

## 2022-12-01 DIAGNOSIS — J45.40 MODERATE PERSISTENT ASTHMA WITHOUT COMPLICATION: ICD-10-CM

## 2022-12-01 DIAGNOSIS — F17.200 NICOTINE DEPENDENCE, UNCOMPLICATED, UNSPECIFIED NICOTINE PRODUCT TYPE: Primary | ICD-10-CM

## 2022-12-01 PROCEDURE — 99214 OFFICE O/P EST MOD 30 MIN: CPT | Performed by: PHYSICIAN ASSISTANT

## 2022-12-01 RX ORDER — FLUTICASONE PROPIONATE AND SALMETEROL 500; 50 UG/1; UG/1
1 POWDER RESPIRATORY (INHALATION) EVERY 12 HOURS
Qty: 3 EACH | Refills: 3 | Status: SHIPPED | OUTPATIENT
Start: 2022-12-01 | End: 2023-12-06

## 2022-12-01 RX ORDER — ALBUTEROL SULFATE 90 UG/1
2 AEROSOL, METERED RESPIRATORY (INHALATION) EVERY 6 HOURS PRN
Qty: 18 G | Refills: 1 | Status: SHIPPED | OUTPATIENT
Start: 2022-12-01 | End: 2023-12-06

## 2022-12-01 ASSESSMENT — PAIN SCALES - GENERAL: PAINLEVEL: NO PAIN (0)

## 2022-12-01 NOTE — PROGRESS NOTES
"  Assessment & Plan     Moderate persistent asthma without complication  Patient is doing very well.  No new concerns.  Refilled medications as requested.  - albuterol (PROAIR HFA) 108 (90 Base) MCG/ACT inhaler; Inhale 2 puffs into the lungs every 6 hours as needed for wheezing  - Asthma Action Plan (AAP); Future  - fluticasone-salmeterol (ADVAIR) 500-50 MCG/ACT inhaler; Inhale 1 puff into the lungs every 12 hours    Nicotine dependence, uncomplicated, unspecified nicotine product type  Referral placed for help with cessation.  Advised that with asthma this is of course never a good idea.  He is well aware of this.  Cessation is completely up to him.     Nicotine/Tobacco Cessation:  He reports that he has been smoking cigarettes. He has a 14.00 pack-year smoking history. He has quit using smokeless tobacco.  Nicotine/Tobacco Cessation Plan:   Referral placed    BMI:   Estimated body mass index is 33.7 kg/m  as calculated from the following:    Height as of this encounter: 1.715 m (5' 7.52\").    Weight as of this encounter: 99.1 kg (218 lb 8 oz).   Weight management plan: Discussed healthy diet and exercise guidelines    Work on weight loss  Regular exercise  Full physical in the near future strongly encouraged.  No follow-ups on file.    Jay Bernstein PA-C  [facility]    Flor Venegas is a 42 year old, presenting for the following health issues:  Recheck Medication      History of Present Illness     Asthma:  He presents for follow up of asthma.  He has some cough, no wheezing, and some shortness of breath. He is not using a relief medication. He does not miss any doses of his controller medication throughout the week.Patient is aware of the following triggers: dust mites and upper respiratory infections. The patient has not had a visit to the Emergency Room, Urgent Care or Hospital due to asthma since the last clinic visit.     He eats 0-1 servings of fruits and vegetables daily.He " "consumes 2 sweetened beverage(s) daily.He exercises with enough effort to increase his heart rate 10 to 19 minutes per day.  He exercises with enough effort to increase his heart rate 3 or less days per week.   He is taking medications regularly.     Review of Systems   Constitutional, HEENT, cardiovascular, pulmonary, GI, , musculoskeletal, neuro, skin, endocrine and psych systems are negative, except as otherwise noted.      Objective    /76   Pulse 103   Temp 97.1  F (36.2  C) (Temporal)   Resp 16   Ht 1.715 m (5' 7.52\")   Wt 99.1 kg (218 lb 8 oz)   SpO2 94%   BMI 33.70 kg/m    Body mass index is 33.7 kg/m .  Physical Exam   GENERAL: healthy, alert and no distress  NECK: no adenopathy, no asymmetry, masses, or scars and thyroid normal to palpation  RESP: lungs clear to auscultation - no rales, rhonchi or wheezes  CV: regular rate and rhythm, normal S1 S2, no S3 or S4, no murmur, click or rub, no peripheral edema and peripheral pulses strong  MS: no gross musculoskeletal defects noted, no edema  SKIN: no suspicious lesions or rashes to exposed visible skin today.  NEURO: Normal strength and tone, mentation intact and speech normal  PSYCH: mentation appears normal, affect normal/bright    No results found for this or any previous visit (from the past 24 hour(s)).              "

## 2022-12-01 NOTE — PATIENT INSTRUCTIONS
How to Quit Smoking  Smoking is a hard habit to break. About 50% of all people who have ever smoked have been able to quit. Most people who still smoke want to quit. Here are some of the best ways to stop smoking.     Keep in mind the health benefits of quitting  The health benefits of quitting start right away. They keep improving the longer you go without smoking. Knowing this can help inspire you to stay on track. These benefits occur at any age. If you are 17 or 70, quitting is a good choice. Some of the health benefits after your last cigarette include:     After 20 minutes: Your blood pressure and pulse return to normal.    After 8 hours: Your oxygen levels return to normal.    After 2 days: Your ability to smell and taste start to improve as damaged nerves regrow.    After 2 to 3 weeks: Your circulation and lung function improve.    After 1 to 9 months: Your coughing, congestion, and shortness of breath decrease. Your tiredness decreases.    After 1 year: Your risk of heart attack decreases by 50%.    After 5 years: Your risk of lung cancer decreases by 50%. Your risk of stroke becomes the same as a nonsmoker s.  Go cold turkey  Most former smokers quit cold turkey. This means stopping all at once. Trying to cut back slowly often doesn't work as well. This may be because it continues the habit of smoking. Also you may inhale more smoke while smoking fewer cigarettes. This leads to the same amount of nicotine in your body.   Get support  Support programs can be a big help, especially for heavy smokers. These groups offer lectures, ways to change behavior, and peer support. Here are some ways to find a support program:     Free national quitline 800-QUIT-NOW (743-828-2696)    [facility] quit-smoking programs    American Lung Association 295-467-0883    American Cancer Society 236-884-2314  Support at home is important too. Family and friends can offer praise and reassurance. If the smoker in your life finds  it hard to quit, encourage them to keep trying.   Try over-the-counter medicine  Nicotine replacement therapy may make it easier to quit. Some aids are available without a prescription. These include a nicotine patch, gum, and lozenges. But it's best to use these under the care of your healthcare provider. The skin patch gives a steady supply of nicotine. Nicotine gum and lozenges give short-time doses of low levels of nicotine. Both methods reduce the craving for cigarettes. If you have nausea, vomiting, dizziness, weakness, or a fast heartbeat, stop using these products. See your provider.   Ask about prescription medicine  After reviewing your smoking patterns and past attempts to quit, your healthcare provider may offer a prescription medicine such as bupropion, varenicline, a nicotine inhaler, or nasal spray. Each has advantages and side effects. Your provider can review these with you.   Keep trying  Most smokers make many attempts at quitting before they succeed. It s important not to give up.   To learn more  For more on how to quit smoking, try these resources:     www.cdc.gov/tobacco/quit_smoking/ 800-QUIT-NOW (552-646-6337)    www.smokefree.gov 877-44U-QUIT (596-392-8735)    www.lung.org/stop-smoking/ 800-LUNGUSA (518-088-7742)  Georgina last reviewed this educational content on 12/1/2019 2000-2021 [employer]. All rights reserved. This information is not intended as a substitute for professional medical care. Always follow your healthcare professional's instructions.

## 2023-02-23 ENCOUNTER — OFFICE VISIT (OUTPATIENT)
Dept: FAMILY MEDICINE | Facility: OTHER | Age: 43
End: 2023-02-23
Payer: COMMERCIAL

## 2023-02-23 VITALS
DIASTOLIC BLOOD PRESSURE: 70 MMHG | HEIGHT: 68 IN | RESPIRATION RATE: 20 BRPM | BODY MASS INDEX: 34.17 KG/M2 | TEMPERATURE: 97.6 F | OXYGEN SATURATION: 94 % | SYSTOLIC BLOOD PRESSURE: 116 MMHG | WEIGHT: 225.5 LBS | HEART RATE: 98 BPM

## 2023-02-23 DIAGNOSIS — L72.3 SEBACEOUS CYST: Primary | ICD-10-CM

## 2023-02-23 DIAGNOSIS — J45.40 MODERATE PERSISTENT ASTHMA WITHOUT COMPLICATION: ICD-10-CM

## 2023-02-23 PROCEDURE — 99214 OFFICE O/P EST MOD 30 MIN: CPT | Performed by: PHYSICIAN ASSISTANT

## 2023-02-23 ASSESSMENT — PAIN SCALES - GENERAL: PAINLEVEL: NO PAIN (0)

## 2023-02-23 NOTE — PROGRESS NOTES
"  Assessment & Plan     Sebaceous cyst - right cheek infront of the ear.  Suspected sebaceous cyst to the right superior cheek just anterior to the right ear.  Would advise a surgical consult for removal.  - Adult General Surg Referral; Future    Moderate persistent asthma without complication  No new concerns with respect to this.  Says that he is breathing is under fairly good control and does not require any further medication.  Follow-up in 6 months.    Advised full physical but due to the weather today he did not want to take any more time in the clinic.    No follow-ups on file.    JOSE RAUL Laura [facility] GENET Venegas is a 42 year old, presenting for the following health issues:  Mass      Mass    History of Present Illness       Reason for visit:  Lump near temple  Symptom onset:  More than a month  Symptom intensity:  Mild  Symptom progression:  Worsening  Had these symptoms before:  No    He eats 0-1 servings of fruits and vegetables daily.He consumes 2 sweetened beverage(s) daily.He exercises with enough effort to increase his heart rate 9 or less minutes per day.  He exercises with enough effort to increase his heart rate 3 or less days per week.   He is taking medications regularly.     Review of Systems   Constitutional, HEENT, cardiovascular, pulmonary, GI, , musculoskeletal, neuro, skin, endocrine and psych systems are negative, except as otherwise noted.      Objective    /70   Pulse 98   Temp 97.6  F (36.4  C) (Temporal)   Resp 20   Ht 1.715 m (5' 7.52\")   Wt 102.3 kg (225 lb 8 oz)   SpO2 94%   BMI 34.78 kg/m    Body mass index is 34.78 kg/m .  Physical Exam   GENERAL: healthy, alert and no distress  NECK: no adenopathy, no asymmetry, masses, or scars and thyroid normal to palpation  MS: no gross musculoskeletal defects noted, no edema  SKIN: no suspicious lesions or rashes to exposed skin but he does have what I think is a sebaceous cyst to " the right cheek just above the temporomandibular joint in front of the right ear.  I do believe that there is 1 area of potential punctal occlusion related to this.  It is approximately 2 cm in rough diameter.  It is tender to touch.  NEURO: Normal strength and tone, mentation intact and speech normal  PSYCH: mentation appears normal, affect normal/bright    No results found for this or any previous visit (from the past 24 hour(s)).               Unknown

## 2023-03-01 ENCOUNTER — OFFICE VISIT (OUTPATIENT)
Dept: SURGERY | Facility: OTHER | Age: 43
End: 2023-03-01
Payer: COMMERCIAL

## 2023-03-01 VITALS
WEIGHT: 226 LBS | BODY MASS INDEX: 35.47 KG/M2 | HEIGHT: 67 IN | SYSTOLIC BLOOD PRESSURE: 116 MMHG | TEMPERATURE: 97 F | DIASTOLIC BLOOD PRESSURE: 70 MMHG

## 2023-03-01 DIAGNOSIS — L08.9 INFECTED SEBACEOUS CYST: Primary | ICD-10-CM

## 2023-03-01 DIAGNOSIS — L72.3 SEBACEOUS CYST: ICD-10-CM

## 2023-03-01 DIAGNOSIS — L72.3 INFECTED SEBACEOUS CYST: Primary | ICD-10-CM

## 2023-03-01 PROCEDURE — 99243 OFF/OP CNSLTJ NEW/EST LOW 30: CPT | Performed by: SURGERY

## 2023-03-01 RX ORDER — CIPROFLOXACIN 500 MG/1
500 TABLET, FILM COATED ORAL 2 TIMES DAILY
Qty: 10 TABLET | Refills: 0 | Status: SHIPPED | OUTPATIENT
Start: 2023-03-01 | End: 2023-03-06

## 2023-03-01 ASSESSMENT — PAIN SCALES - GENERAL: PAINLEVEL: NO PAIN (0)

## 2023-03-01 NOTE — PROGRESS NOTES
Patient seen in consultation for cyst of the right temple by Jay Goodwin    HPI:  Patient is a 42 year old male with at least a year long history of small bump on the right temple area.  Over the last month it has gotten larger in size and tender with palpation.  There is some mild erythema to the area as well.  He was seen by primary care and this was thought to be cyst.  He was referred for his options.  He does not take blood thinning medication.    Review Of Systems    Skin: as above  Ears/Nose/Throat: negative  Respiratory: No shortness of breath, dyspnea on exertion, cough, or hemoptysis  Cardiovascular: negative  Gastrointestinal: negative  Genitourinary: negative  Musculoskeletal: negative  Neurologic: negative  Hematologic/Lymphatic/Immunologic: negative  Endocrine: negative      Past Medical History:   Diagnosis Date     Heartburn      Moderate persistent asthma      Tobacco abuse disorder 10/31/2017       Past Surgical History:   Procedure Laterality Date     ARTHROSCOPY KNEE RT/LT      Right knee for meniscal tear     ENDOSCOPY  2005    negatve findings     PHACOEMULSIFICATION WITH STANDARD INTRAOCULAR LENS IMPLANT Right 9/15/2022    Procedure: PHACOEMULSIFICATION WITH A STANDARD INTRAOCULAR LENS IMPLANT, Right;  Surgeon: Quang Woodward MD;  [facility]     PHACOEMULSIFICATION WITH STANDARD INTRAOCULAR LENS IMPLANT Left 9/29/2022    Procedure: PHACOEMULSIFICATION WITH A STANDARD INTRAOCULAR LENS IMPLANT, Left;  Surgeon: Cesar Burgos MD;  Location[facility]     SURGICAL HISTORY OF -       nasal surgery for epistaxis and obstruction       Family History   Problem Relation Age of Onset     Gastrointestinal Disease Mother         Crohn's disease     Colon Cancer No family hx of      Prostate Cancer No family hx of      Thyroid Cancer No family hx of      Diabetes No family hx of      Hypertension No family hx of      Hyperlipidemia No family hx of        Social History     Socioeconomic  History     Marital status: Single     Spouse name: Not on file     Number of children: Not on file     Years of education: Not on file     Highest education level: Not on file   Occupational History     Not on file   Tobacco Use     Smoking status: Every Day     Packs/day: 1.00     Years: 14.00     Pack years: 14.00     Types: Cigarettes     Smokeless tobacco: Former     Tobacco comments:     Advised to quit    Vaping Use     Vaping Use: Never used   Substance and Sexual Activity     Alcohol use: Yes     Alcohol/week: 0.0 standard drinks     Comment: social infrequently     Drug use: No     Sexual activity: Yes     Partners: Female   Other Topics Concern     Parent/sibling w/ CABG, MI or angioplasty before 65F 55M? No      Service No     Blood Transfusions No     Caffeine Concern No     Occupational Exposure No     Hobby Hazards No     Sleep Concern Yes     Comment: At times     Stress Concern No     Weight Concern Yes     Comment: Feels a little overweight     Special Diet No     Back Care No     Exercise No     Bike Helmet No     Seat Belt Yes     Self-Exams No   Social History Narrative     Not on file     Social Determinants of Health     Financial Resource Strain: Not on file   Food Insecurity: Not on file   Transportation Needs: Not on file   Physical Activity: Not on file   Stress: Not on file   Social Connections: Not on file   Intimate Partner Violence: Not on file   Housing Stability: Not on file       Current Outpatient Medications   Medication Sig Dispense Refill     albuterol (PROAIR HFA) 108 (90 Base) MCG/ACT inhaler Inhale 2 puffs into the lungs every 6 hours as needed for wheezing 18 g 1     ciprofloxacin (CIPRO) 500 MG tablet Take 1 tablet (500 mg) by mouth 2 times daily for 5 days 10 tablet 0     fluticasone-salmeterol (ADVAIR) 500-50 MCG/ACT inhaler Inhale 1 puff into the lungs every 12 hours 3 each 3       Medications and history reviewed    Physical exam:  Vitals: /70   Temp 97  " F (36.1  C) (Temporal)   Ht 1.708 m (5' 7.25\")   Wt 102.5 kg (226 lb)   BMI 35.13 kg/m    BMI= Body mass index is 35.13 kg/m .    Constitutional: Healthy, alert, non-distressed   Head: Normo-cephalic, atraumatic, no lesions, masses or tenderness   Cardiovascular: RRR, no new murmurs, +S1, +S2 heart sounds, no clicks, rubs or gallops   Respiratory: CTAB, no rales, rhonchi or wheezing, equal chest rise, good respiratory effort   Gastrointestinal: Soft, non-tender, non distended, no rebound rigidity or guarding, no masses or hernias palpated   : Deferred  Musculoskeletal: Moves all extremities, normal  strength, no deformities noted   Skin: Right temple area within the hairline there is a 2.5 cm subcutaneous mass.  There is a central area that looks like there may be a head forming.  This is consistent with cyst.  There is some erythema to the area as well with mild tenderness with palpation  Psychiatric: Mentation appears normal, affect appropriate   Hematologic/Lymphatic/Immunologic: Normal cervical and supraclavicular lymph nodes   Patient able to get up on table without difficulty.    Labs show:  No results found for this or any previous visit (from the past 24 hour(s)).    Imaging shows:  No results found for this or any previous visit (from the past 744 hour(s)).     Assessment:     ICD-10-CM    1. Infected sebaceous cyst  L72.3 ciprofloxacin (CIPRO) 500 MG tablet    L08.9       2. Sebaceous cyst - right cheek infront of the ear.  L72.3 Adult General Surg Referral        Plan: Due to the increase in size, tenderness and erythema I recommend short course of antibiotics as this appears to be infected.  We discussed more definitive management with excision once the infection clears.  He will return in 2 weeks for excision.    30 minutes spent on the date of the encounter doing chart review, history and exam, documentation and further activities per the note    Herbie Patel, DO    "

## 2023-03-01 NOTE — LETTER
3/1/2023         RE: Theo Pozo  [address] [address] [place] [address]  [place] [address]        Dear Colleague,    Thank you for referring your patient, Theo Pozo, to the [facility]. Please see a copy of my visit note below.    Patient seen in consultation for cyst of the right temple by Jay Goodwin    HPI:  Patient is a 42 year old male with at least a year long history of small bump on the right temple area.  Over the last month it has gotten larger in size and tender with palpation.  There is some mild erythema to the area as well.  He was seen by primary care and this was thought to be cyst.  He was referred for his options.  He does not take blood thinning medication.    Review Of Systems    Skin: as above  Ears/Nose/Throat: negative  Respiratory: No shortness of breath, dyspnea on exertion, cough, or hemoptysis  Cardiovascular: negative  Gastrointestinal: negative  Genitourinary: negative  Musculoskeletal: negative  Neurologic: negative  Hematologic/Lymphatic/Immunologic: negative  Endocrine: negative      Past Medical History:   Diagnosis Date     Heartburn      Moderate persistent asthma      Tobacco abuse disorder 10/31/2017       Past Surgical History:   Procedure Laterality Date     ARTHROSCOPY KNEE RT/LT      Right knee for meniscal tear     ENDOSCOPY  2005    negatve findings     PHACOEMULSIFICATION WITH STANDARD INTRAOCULAR LENS IMPLANT Right 9/15/2022    Procedure: PHACOEMULSIFICATION WITH A STANDARD INTRAOCULAR LENS IMPLANT, Right;  Surgeon: Quang Woodward MD;  [facility]     PHACOEMULSIFICATION WITH STANDARD INTRAOCULAR LENS IMPLANT Left 9/29/2022    Procedure: PHACOEMULSIFICATION WITH A STANDARD INTRAOCULAR LENS IMPLANT, Left;  Surgeon: Cesar Burgos MD;  [facility]     SURGICAL HISTORY OF -       nasal surgery for epistaxis and obstruction       Family History   Problem Relation Age of Onset     Gastrointestinal Disease Mother         Crohn's  disease     Colon Cancer No family hx of      Prostate Cancer No family hx of      Thyroid Cancer No family hx of      Diabetes No family hx of      Hypertension No family hx of      Hyperlipidemia No family hx of        Social History     Socioeconomic History     Marital status: Single     Spouse name: Not on file     Number of children: Not on file     Years of education: Not on file     Highest education level: Not on file   Occupational History     Not on file   Tobacco Use     Smoking status: Every Day     Packs/day: 1.00     Years: 14.00     Pack years: 14.00     Types: Cigarettes     Smokeless tobacco: Former     Tobacco comments:     Advised to quit    Vaping Use     Vaping Use: Never used   Substance and Sexual Activity     Alcohol use: Yes     Alcohol/week: 0.0 standard drinks     Comment: social infrequently     Drug use: No     Sexual activity: Yes     Partners: Female   Other Topics Concern     Parent/sibling w/ CABG, MI or angioplasty before 65F 55M? No      Service No     Blood Transfusions No     Caffeine Concern No     Occupational Exposure No     Hobby Hazards No     Sleep Concern Yes     Comment: At times     Stress Concern No     Weight Concern Yes     Comment: Feels a little overweight     Special Diet No     Back Care No     Exercise No     Bike Helmet No     Seat Belt Yes     Self-Exams No   Social History Narrative     Not on file     Social Determinants of Health     Financial Resource Strain: Not on file   Food Insecurity: Not on file   Transportation Needs: Not on file   Physical Activity: Not on file   Stress: Not on file   Social Connections: Not on file   Intimate Partner Violence: Not on file   Housing Stability: Not on file       Current Outpatient Medications   Medication Sig Dispense Refill     albuterol (PROAIR HFA) 108 (90 Base) MCG/ACT inhaler Inhale 2 puffs into the lungs every 6 hours as needed for wheezing 18 g 1     ciprofloxacin (CIPRO) 500 MG tablet Take 1 tablet  "(500 mg) by mouth 2 times daily for 5 days 10 tablet 0     fluticasone-salmeterol (ADVAIR) 500-50 MCG/ACT inhaler Inhale 1 puff into the lungs every 12 hours 3 each 3       Medications and history reviewed    Physical exam:  Vitals: /70   Temp 97  F (36.1  C) (Temporal)   Ht 1.708 m (5' 7.25\")   Wt 102.5 kg (226 lb)   BMI 35.13 kg/m    BMI= Body mass index is 35.13 kg/m .    Constitutional: Healthy, alert, non-distressed   Head: Normo-cephalic, atraumatic, no lesions, masses or tenderness   Cardiovascular: RRR, no new murmurs, +S1, +S2 heart sounds, no clicks, rubs or gallops   Respiratory: CTAB, no rales, rhonchi or wheezing, equal chest rise, good respiratory effort   Gastrointestinal: Soft, non-tender, non distended, no rebound rigidity or guarding, no masses or hernias palpated   : Deferred  Musculoskeletal: Moves all extremities, normal  strength, no deformities noted   Skin: Right temple area within the hairline there is a 2.5 cm subcutaneous mass.  There is a central area that looks like there may be a head forming.  This is consistent with cyst.  There is some erythema to the area as well with mild tenderness with palpation  Psychiatric: Mentation appears normal, affect appropriate   Hematologic/Lymphatic/Immunologic: Normal cervical and supraclavicular lymph nodes   Patient able to get up on table without difficulty.    Labs show:  No results found for this or any previous visit (from the past 24 hour(s)).    Imaging shows:  No results found for this or any previous visit (from the past 744 hour(s)).     Assessment:     ICD-10-CM    1. Infected sebaceous cyst  L72.3 ciprofloxacin (CIPRO) 500 MG tablet    L08.9       2. Sebaceous cyst - right cheek infront of the ear.  L72.3 Adult General Surg Referral        Plan: Due to the increase in size, tenderness and erythema I recommend short course of antibiotics as this appears to be infected.  We discussed more definitive management with excision " once the infection clears.  He will return in 2 weeks for excision.    30 minutes spent on the date of the encounter doing chart review, history and exam, documentation and further activities per the note    Herbie Patel, DO        Again, thank you for allowing me to participate in the care of your patient.        Sincerely,        Herbie Patel, DO

## 2023-03-15 ENCOUNTER — OFFICE VISIT (OUTPATIENT)
Dept: SURGERY | Facility: OTHER | Age: 43
End: 2023-03-15
Payer: COMMERCIAL

## 2023-03-15 VITALS — HEIGHT: 67 IN | WEIGHT: 226 LBS | BODY MASS INDEX: 35.47 KG/M2 | TEMPERATURE: 98 F

## 2023-03-15 DIAGNOSIS — L72.3 SEBACEOUS CYST: Primary | ICD-10-CM

## 2023-03-15 PROCEDURE — 99207 PR DROP WITH A PROCEDURE: CPT | Performed by: SURGERY

## 2023-03-15 ASSESSMENT — PAIN SCALES - GENERAL: PAINLEVEL: NO PAIN (0)

## 2023-03-15 NOTE — PROGRESS NOTES
PROCEDURE: Excision of epidermal inclusion cyst right temple   Written consent was obtained    The right temple area was prepped and appropriately anesthetized with 1% lidocaine with epinephrine. Using the usual technique, excision of subcutaneous cyst was performed.  Cyst measured approximately 1 cm x 1 cm and was filled with mostly clear fluid.  Cyst wall was emaciated and was removed piecemeal with mechanical debridement.  Incision was irrigated with sterile saline.  Closure was accomplished with interrupted 3-0 vicryl for the dermal layer and subcuticular 4-0 monocryl for the skin. Total length of the incision after closure was 1.3 cm. An appropriate  dressing was applied.  The procedure was well tolerated and without complications. Specimen was not sent to Pathology.    Dr Patel

## 2023-03-15 NOTE — LETTER
3/15/2023         RE: Teho Pozo  [address] [address] [place] [address]  [place] [address]        Dear Colleague,    Thank you for referring your patient, Theo Pozo, to the [facility]. Please see a copy of my visit note below.    PROCEDURE: Excision of epidermal inclusion cyst right temple   Written consent was obtained    The right temple area was prepped and appropriately anesthetized with 1% lidocaine with epinephrine. Using the usual technique, excision of subcutaneous cyst was performed.  Cyst measured approximately 1 cm x 1 cm and was filled with mostly clear fluid.  Cyst wall was emaciated and was removed piecemeal with mechanical debridement.  Incision was irrigated with sterile saline.  Closure was accomplished with interrupted 3-0 vicryl for the dermal layer and subcuticular 4-0 monocryl for the skin. Total length of the incision after closure was 1.3 cm. An appropriate  dressing was applied.  The procedure was well tolerated and without complications. Specimen was not sent to Pathology.    Dr Patel      Again, thank you for allowing me to participate in the care of your patient.        Sincerely,        Herbie Patel, DO

## 2023-07-02 ENCOUNTER — HEALTH MAINTENANCE LETTER (OUTPATIENT)
Age: 43
End: 2023-07-02

## 2023-07-05 ENCOUNTER — APPOINTMENT (OUTPATIENT)
Dept: GENERAL RADIOLOGY | Facility: CLINIC | Age: 43
End: 2023-07-05
Attending: EMERGENCY MEDICINE
Payer: COMMERCIAL

## 2023-07-05 ENCOUNTER — HOSPITAL ENCOUNTER (EMERGENCY)
Facility: CLINIC | Age: 43
Discharge: HOME | End: 2023-07-05
Attending: EMERGENCY MEDICINE | Admitting: EMERGENCY MEDICINE
Payer: COMMERCIAL

## 2023-07-05 VITALS
TEMPERATURE: 98 F | DIASTOLIC BLOOD PRESSURE: 74 MMHG | OXYGEN SATURATION: 97 % | WEIGHT: 227.8 LBS | SYSTOLIC BLOOD PRESSURE: 118 MMHG | HEART RATE: 79 BPM | RESPIRATION RATE: 18 BRPM | HEIGHT: 68 IN | BODY MASS INDEX: 34.53 KG/M2

## 2023-07-05 DIAGNOSIS — F17.200 TOBACCO USE DISORDER: ICD-10-CM

## 2023-07-05 DIAGNOSIS — R07.81 PLEURITIC CHEST PAIN: ICD-10-CM

## 2023-07-05 LAB
BASOPHILS # BLD AUTO: 0.1 10E3/UL (ref 0–0.2)
BASOPHILS NFR BLD AUTO: 1 %
D DIMER PPP FEU-MCNC: 0.33 UG/ML FEU (ref 0–0.5)
EOSINOPHIL # BLD AUTO: 0.5 10E3/UL (ref 0–0.7)
EOSINOPHIL NFR BLD AUTO: 5 %
ERYTHROCYTE [DISTWIDTH] IN BLOOD BY AUTOMATED COUNT: 14.1 % (ref 10–15)
HCT VFR BLD AUTO: 50.1 % (ref 40–53)
HGB BLD-MCNC: 16.5 G/DL (ref 13.3–17.7)
HOLD SPECIMEN: NORMAL
IMM GRANULOCYTES # BLD: 0 10E3/UL
IMM GRANULOCYTES NFR BLD: 0 %
LYMPHOCYTES # BLD AUTO: 3.3 10E3/UL (ref 0.8–5.3)
LYMPHOCYTES NFR BLD AUTO: 32 %
MCH RBC QN AUTO: 30.2 PG (ref 26.5–33)
MCHC RBC AUTO-ENTMCNC: 32.9 G/DL (ref 31.5–36.5)
MCV RBC AUTO: 92 FL (ref 78–100)
MONOCYTES # BLD AUTO: 0.7 10E3/UL (ref 0–1.3)
MONOCYTES NFR BLD AUTO: 7 %
NEUTROPHILS # BLD AUTO: 5.7 10E3/UL (ref 1.6–8.3)
NEUTROPHILS NFR BLD AUTO: 55 %
NRBC # BLD AUTO: 0 10E3/UL
NRBC BLD AUTO-RTO: 0 /100
PLATELET # BLD AUTO: 319 10E3/UL (ref 150–450)
RBC # BLD AUTO: 5.46 10E6/UL (ref 4.4–5.9)
WBC # BLD AUTO: 10.2 10E3/UL (ref 4–11)

## 2023-07-05 PROCEDURE — 85379 FIBRIN DEGRADATION QUANT: CPT | Performed by: EMERGENCY MEDICINE

## 2023-07-05 PROCEDURE — 93005 ELECTROCARDIOGRAM TRACING: CPT | Performed by: EMERGENCY MEDICINE

## 2023-07-05 PROCEDURE — 71101 X-RAY EXAM UNILAT RIBS/CHEST: CPT | Mod: RT

## 2023-07-05 PROCEDURE — 85025 COMPLETE CBC W/AUTO DIFF WBC: CPT | Performed by: EMERGENCY MEDICINE

## 2023-07-05 PROCEDURE — 36415 COLL VENOUS BLD VENIPUNCTURE: CPT | Performed by: EMERGENCY MEDICINE

## 2023-07-05 PROCEDURE — 93010 ELECTROCARDIOGRAM REPORT: CPT | Performed by: EMERGENCY MEDICINE

## 2023-07-05 PROCEDURE — 99284 EMERGENCY DEPT VISIT MOD MDM: CPT | Mod: 25 | Performed by: EMERGENCY MEDICINE

## 2023-07-05 PROCEDURE — 99285 EMERGENCY DEPT VISIT HI MDM: CPT | Performed by: EMERGENCY MEDICINE

## 2023-07-05 RX ORDER — IBUPROFEN 200 MG
800 TABLET ORAL EVERY 8 HOURS PRN
COMMUNITY
End: 2024-09-26

## 2023-07-05 RX ORDER — METHYLPREDNISOLONE 4 MG
TABLET, DOSE PACK ORAL
Qty: 21 TABLET | Refills: 0 | Status: SHIPPED | OUTPATIENT
Start: 2023-07-05 | End: 2023-12-06

## 2023-07-05 ASSESSMENT — ACTIVITIES OF DAILY LIVING (ADL)
ADLS_ACUITY_SCORE: 35
ADLS_ACUITY_SCORE: 35
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
## 2023-07-05 NOTE — ED NOTES
Patient was given discharge instructions and teaching has been done by this writer. All questions addressed. Patient is taking all of their belongings with them and has been shown where to exit the ED.

## 2023-07-05 NOTE — DISCHARGE INSTRUCTIONS
-Follow instructions on the Medrol Dosepak.  Your chest discomfort is consistent with pleurisy.  Recommend smoking cessation.

## 2023-07-05 NOTE — ED TRIAGE NOTES
He has had R lateral chest pain for greater than a week. It is worsening.  He has no known injury or cough and the pain does not worsen with palpation.     Triage Assessment     Row Name 07/05/23 0900       Respiratory WDL    Respiratory WDL WDL       Skin Circulation/Temperature WDL    Skin Circulation/Temperature WDL WDL       Cardiac WDL    Cardiac WDL WDL

## 2023-07-05 NOTE — ED NOTES
Pt irritated with wait time. Explained Md is in the process of a transfer but would be in as soon as he is able.

## 2023-12-05 ASSESSMENT — ASTHMA QUESTIONNAIRES: ACT_TOTALSCORE: 24

## 2023-12-06 ENCOUNTER — VIRTUAL VISIT (OUTPATIENT)
Dept: FAMILY MEDICINE | Facility: OTHER | Age: 43
End: 2023-12-06
Payer: COMMERCIAL

## 2023-12-06 DIAGNOSIS — J45.40 MODERATE PERSISTENT ASTHMA WITHOUT COMPLICATION: Primary | ICD-10-CM

## 2023-12-06 PROCEDURE — 99213 OFFICE O/P EST LOW 20 MIN: CPT | Mod: VID | Performed by: PHYSICIAN ASSISTANT

## 2023-12-06 RX ORDER — ALBUTEROL SULFATE 90 UG/1
2 AEROSOL, METERED RESPIRATORY (INHALATION) EVERY 6 HOURS PRN
Qty: 8.5 G | Refills: 0 | Status: SHIPPED | OUTPATIENT
Start: 2023-12-06 | End: 2024-09-26

## 2023-12-06 RX ORDER — FLUTICASONE PROPIONATE AND SALMETEROL 500; 50 UG/1; UG/1
1 POWDER RESPIRATORY (INHALATION) EVERY 12 HOURS
Qty: 3 EACH | Refills: 3 | Status: SHIPPED | OUTPATIENT
Start: 2023-12-06 | End: 2024-09-03

## 2023-12-06 NOTE — PROGRESS NOTES
Theo is a 43 year old who is being evaluated via a billable video visit.      How would you like to obtain your AVS? MyChart  If the video visit is dropped, the invitation should be resent by: Text to cell phone: [phone]  Will anyone else be joining your video visit? No        Assessment & Plan       ICD-10-CM    1. Moderate persistent asthma without complication  J45.40 albuterol (PROAIR HFA) 108 (90 Base) MCG/ACT inhaler     fluticasone-salmeterol (ADVAIR) 500-50 MCG/ACT inhaler          1. Asthma remains well controlled so will continue Advair. He rarely uses albuterol but his current inhaler is  so will send a refill. He still needs to work on quitting smoking. He plans to schedule a physical within the next few months with his PCP and would like to wait to discuss preventative screening like a colonoscopy until then.     Yaron Magaña PA-C  [facility]   Theo is a 43 year old, presenting for the following health issues:  Asthma        2023     7:19 AM   Additional Questions   Roomed by Yuriy MIKE   Accompanied by Self         2023     7:19 AM   Patient Reported Additional Medications   Patient reports taking the following new medications None       History of Present Illness     Asthma:  He presents for follow up of asthma.  He has no cough, no wheezing, and no shortness of breath. He is not using a relief medication.  He does not miss any doses of his controller medication throughout the week. Patient is aware of the following triggers: dust mites and upper respiratory infections. The patient has not had a visit to the Emergency Room, Urgent Care or Hospital due to asthma since the last clinic visit.     He eats 0-1 servings of fruits and vegetables daily.He consumes 2 sweetened beverage(s) daily.He exercises with enough effort to increase his heart rate 9 or less minutes per day.  He exercises with enough effort to increase his heart rate 3 or  less days per week.   He is taking medications regularly.     He feels his asthma remains well controlled. He cannot remember the last time he uses his rescue inhaler as it has been a few years. He remains on twice daily Advair. He continues to smoke.        9/7/2021     9:35 AM 9/9/2022    10:54 AM 12/5/2023     5:21 PM   ACT Total Scores   ACT TOTAL SCORE (Goal Greater than or Equal to 20) 25 25 24   In the past 12 months, how many times did you visit the emergency room for your asthma without being admitted to the hospital? 0 0 0   In the past 12 months, how many times were you hospitalized overnight because of your asthma? 0 0 0         Review of Systems   Constitutional, HEENT, cardiovascular, pulmonary, gi and gu systems are negative, except as otherwise noted.      Objective         Vitals:  No vitals were obtained today due to virtual visit.    Physical Exam   GENERAL: Healthy, alert and no distress  EYES: Eyes grossly normal to inspection.  No discharge or erythema, or obvious scleral/conjunctival abnormalities.  RESP: No audible wheeze, cough, or visible cyanosis.  No visible retractions or increased work of breathing.    SKIN: Visible skin clear. No significant rash, abnormal pigmentation or lesions.  NEURO: Cranial nerves grossly intact.  Mentation and speech appropriate for age.  PSYCH: Mentation appears normal, affect normal/bright, judgement and insight intact, normal speech and appearance well-groomed.        Video-Visit Details    Type of service:  Video Visit     Originating Location (pt. Location): Home  Distant Location (provider location):  Off-site  Platform used for Video Visit: Guillermina

## 2023-12-06 NOTE — PATIENT INSTRUCTIONS
Will refill your inhalers.  Work on quitting smoking.    Schedule an annual physical with Jay within the next few months.

## 2024-08-25 ENCOUNTER — HEALTH MAINTENANCE LETTER (OUTPATIENT)
Age: 44
End: 2024-08-25

## 2024-09-02 DIAGNOSIS — J45.40 MODERATE PERSISTENT ASTHMA WITHOUT COMPLICATION: ICD-10-CM

## 2024-09-03 RX ORDER — FLUTICASONE PROPIONATE AND SALMETEROL 500; 50 UG/1; UG/1
1 POWDER RESPIRATORY (INHALATION) EVERY 12 HOURS
Qty: 60 EACH | Refills: 0 | Status: SHIPPED | OUTPATIENT
Start: 2024-09-03 | End: 2024-09-26

## 2024-09-25 ASSESSMENT — ASTHMA QUESTIONNAIRES
QUESTION_3 LAST FOUR WEEKS HOW OFTEN DID YOUR ASTHMA SYMPTOMS (WHEEZING, COUGHING, SHORTNESS OF BREATH, CHEST TIGHTNESS OR PAIN) WAKE YOU UP AT NIGHT OR EARLIER THAN USUAL IN THE MORNING: NOT AT ALL
ACT_TOTALSCORE: 24
QUESTION_2 LAST FOUR WEEKS HOW OFTEN HAVE YOU HAD SHORTNESS OF BREATH: NOT AT ALL
QUESTION_5 LAST FOUR WEEKS HOW WOULD YOU RATE YOUR ASTHMA CONTROL: WELL CONTROLLED
QUESTION_4 LAST FOUR WEEKS HOW OFTEN HAVE YOU USED YOUR RESCUE INHALER OR NEBULIZER MEDICATION (SUCH AS ALBUTEROL): NOT AT ALL
ACT_TOTALSCORE: 24
QUESTION_1 LAST FOUR WEEKS HOW MUCH OF THE TIME DID YOUR ASTHMA KEEP YOU FROM GETTING AS MUCH DONE AT WORK, SCHOOL OR AT HOME: NONE OF THE TIME

## 2024-09-25 ASSESSMENT — PAIN SCALES - PAIN ENJOYMENT GENERAL ACTIVITY SCALE (PEG)
PEG_TOTALSCORE: 3.67
AVG_PAIN_PASTWEEK: 5
INTERFERED_ENJOYMENT_LIFE: 2
PEG_TOTALSCORE: 3.67
INTERFERED_GENERAL_ACTIVITY: 4

## 2024-09-26 ENCOUNTER — OFFICE VISIT (OUTPATIENT)
Dept: FAMILY MEDICINE | Facility: OTHER | Age: 44
End: 2024-09-26
Payer: COMMERCIAL

## 2024-09-26 VITALS
HEART RATE: 86 BPM | BODY MASS INDEX: 33.65 KG/M2 | WEIGHT: 222 LBS | OXYGEN SATURATION: 95 % | RESPIRATION RATE: 16 BRPM | DIASTOLIC BLOOD PRESSURE: 68 MMHG | HEIGHT: 68 IN | SYSTOLIC BLOOD PRESSURE: 110 MMHG | TEMPERATURE: 98 F

## 2024-09-26 DIAGNOSIS — M77.11 LATERAL EPICONDYLITIS OF RIGHT ELBOW: ICD-10-CM

## 2024-09-26 DIAGNOSIS — G89.29 CHRONIC ELBOW PAIN, RIGHT: ICD-10-CM

## 2024-09-26 DIAGNOSIS — J45.40 MODERATE PERSISTENT ASTHMA WITHOUT COMPLICATION: ICD-10-CM

## 2024-09-26 DIAGNOSIS — Z12.11 SCREEN FOR COLON CANCER: Primary | ICD-10-CM

## 2024-09-26 DIAGNOSIS — M25.521 CHRONIC ELBOW PAIN, RIGHT: ICD-10-CM

## 2024-09-26 DIAGNOSIS — E78.5 HYPERLIPIDEMIA LDL GOAL <130: ICD-10-CM

## 2024-09-26 PROCEDURE — G2211 COMPLEX E/M VISIT ADD ON: HCPCS | Performed by: PHYSICIAN ASSISTANT

## 2024-09-26 PROCEDURE — 99213 OFFICE O/P EST LOW 20 MIN: CPT | Performed by: PHYSICIAN ASSISTANT

## 2024-09-26 RX ORDER — FLUTICASONE PROPIONATE AND SALMETEROL 500; 50 UG/1; UG/1
1 POWDER RESPIRATORY (INHALATION) EVERY 12 HOURS
Qty: 180 EACH | Refills: 3 | Status: SHIPPED | OUTPATIENT
Start: 2024-09-26

## 2024-09-26 RX ORDER — ALBUTEROL SULFATE 90 UG/1
2 AEROSOL, METERED RESPIRATORY (INHALATION) EVERY 6 HOURS PRN
Qty: 8.5 G | Refills: 0 | Status: SHIPPED | OUTPATIENT
Start: 2024-09-26

## 2024-09-26 ASSESSMENT — PAIN SCALES - GENERAL: PAINLEVEL: MILD PAIN (2)

## 2024-09-26 NOTE — PROGRESS NOTES
"  Assessment & Plan   The longitudinal plan of care for the diagnosis(es)/condition(s) as documented were addressed during this visit. Due to the added complexity in care, I will continue to support Theo in the subsequent management and with ongoing continuity of care.    Chronic elbow pain, right  Lateral epicondylitis of right elbow  Discussed the pathophysiology and mechanism of this.  Advised that he  a tennis elbow band for application and see occupational therapy for further assessment and treatment options.  Over-the-counter ibuprofen is an option for him as well.  - Occupational Therapy  Referral; Future    Moderate persistent asthma without complication  Doing well with respect to this.  Refilled medications as requested.  Do recommend a follow-up in 6 months.    - albuterol (PROAIR HFA) 108 (90 Base) MCG/ACT inhaler; Inhale 2 puffs into the lungs every 6 hours as needed for wheezing.  - fluticasone-salmeterol (WIXELA INHUB) 500-50 MCG/ACT inhaler; Inhale 1 puff into the lungs every 12 hours.    Hyperlipidemia LDL goal <130  Due for full physical in the near future.  Follow-up based on those results at that point in time.    Screen for colon cancer  - Colonoscopy Screening  Referral; Future          BMI  Estimated body mass index is 33.75 kg/m  as calculated from the following:    Height as of this encounter: 1.727 m (5' 8\").    Weight as of this encounter: 100.7 kg (222 lb).   Weight management plan: Discussed healthy diet and exercise guidelines      Work on weight loss  Regular exercise    Subjective   Theo is a 44 year old, presenting for the following health issues:  Elbow Injury        9/26/2024     9:06 AM   Additional Questions   Roomed by Maura   Accompanied by self     History of Present Illness       Reason for visit:  Elbow pain  Symptom onset:  More than a month  Symptoms include:  Pain lifting with right arm  Symptom intensity:  Moderate  Symptom progression:  " "Staying the same  Had these symptoms before:  No  What makes it worse:  Lifting anything  What makes it better:  No   He is taking medications regularly.       Review of Systems  Constitutional, HEENT, cardiovascular, pulmonary, GI, , musculoskeletal, neuro, skin, endocrine and psych systems are negative, except as otherwise noted.      Objective    /68   Pulse 86   Temp 98  F (36.7  C) (Temporal)   Resp 16   Ht 1.727 m (5' 8\")   Wt 100.7 kg (222 lb)   SpO2 95%   BMI 33.75 kg/m    Body mass index is 33.75 kg/m .  Physical Exam   GENERAL: alert and no distress  NECK: no adenopathy, no asymmetry, masses, or scars  RESP: lungs clear to auscultation - no rales, rhonchi or wheezes  CV: regular rate and rhythm, normal S1 S2, no S3 or S4, no murmur, click or rub, no peripheral edema  ABDOMEN: soft, nontender, no hepatosplenomegaly, no masses and bowel sounds normal  MS: no gross musculoskeletal defects noted, no edema, pain noted over the lateral epicondyle today.  Very consistent with tennis elbow.  Range of motion is maintained and strength is maintained as well.          Signed Electronically by: Jay Cervantes PA-C    "

## (undated) DEVICE — GLOVE PROTEGRITY 7.5 LATEX

## (undated) DEVICE — SOL WATER IRRIG 1000ML BOTTLE 07139-09

## (undated) DEVICE — SOL NACL 0.9% IRRIG 1000ML BOTTLE 07138-09

## (undated) RX ORDER — FENTANYL CITRATE 50 UG/ML
INJECTION, SOLUTION INTRAMUSCULAR; INTRAVENOUS
Status: DISPENSED
Start: 2022-09-15

## (undated) RX ORDER — FENTANYL CITRATE 50 UG/ML
INJECTION, SOLUTION INTRAMUSCULAR; INTRAVENOUS
Status: DISPENSED
Start: 2022-09-29